# Patient Record
Sex: MALE | Race: WHITE | NOT HISPANIC OR LATINO | URBAN - METROPOLITAN AREA
[De-identification: names, ages, dates, MRNs, and addresses within clinical notes are randomized per-mention and may not be internally consistent; named-entity substitution may affect disease eponyms.]

---

## 2017-01-04 ENCOUNTER — AMBULATORY - HEALTHEAST (OUTPATIENT)
Dept: ADDICTION MEDICINE | Facility: CLINIC | Age: 67
End: 2017-01-04

## 2017-01-05 ENCOUNTER — OFFICE VISIT - HEALTHEAST (OUTPATIENT)
Dept: ADDICTION MEDICINE | Facility: CLINIC | Age: 67
End: 2017-01-05

## 2017-01-05 DIAGNOSIS — F10.20 ALCOHOL USE DISORDER, SEVERE, DEPENDENCE (H): ICD-10-CM

## 2017-01-06 ENCOUNTER — AMBULATORY - HEALTHEAST (OUTPATIENT)
Dept: BEHAVIORAL HEALTH | Facility: CLINIC | Age: 67
End: 2017-01-06

## 2017-01-06 ENCOUNTER — COMMUNICATION - HEALTHEAST (OUTPATIENT)
Dept: BEHAVIORAL HEALTH | Facility: CLINIC | Age: 67
End: 2017-01-06

## 2017-01-06 DIAGNOSIS — F10.20 ALCOHOL USE DISORDER, SEVERE, DEPENDENCE (H): ICD-10-CM

## 2017-01-06 DIAGNOSIS — F32.A DEPRESSION: ICD-10-CM

## 2017-01-06 DIAGNOSIS — F11.21 OPIOID USE DISORDER, SEVERE, IN EARLY REMISSION, ON MAINTENANCE THERAPY, DEPENDENCE (H): ICD-10-CM

## 2017-01-07 ENCOUNTER — COMMUNICATION - HEALTHEAST (OUTPATIENT)
Dept: SCHEDULING | Facility: CLINIC | Age: 67
End: 2017-01-07

## 2017-01-09 ENCOUNTER — AMBULATORY - HEALTHEAST (OUTPATIENT)
Dept: ADDICTION MEDICINE | Facility: CLINIC | Age: 67
End: 2017-01-09

## 2017-01-11 ENCOUNTER — OFFICE VISIT - HEALTHEAST (OUTPATIENT)
Dept: BEHAVIORAL HEALTH | Facility: CLINIC | Age: 67
End: 2017-01-11

## 2017-01-11 DIAGNOSIS — F10.20 ALCOHOL USE DISORDER, SEVERE, DEPENDENCE (H): ICD-10-CM

## 2017-01-11 DIAGNOSIS — F43.10 POST TRAUMATIC STRESS DISORDER: ICD-10-CM

## 2017-01-11 DIAGNOSIS — F11.21 OPIOID USE DISORDER, SEVERE, IN EARLY REMISSION, ON MAINTENANCE THERAPY, DEPENDENCE (H): ICD-10-CM

## 2017-01-11 DIAGNOSIS — F32.A DEPRESSION: ICD-10-CM

## 2017-01-11 DIAGNOSIS — F32.0 MILD SINGLE CURRENT EPISODE OF MAJOR DEPRESSIVE DISORDER (H): ICD-10-CM

## 2017-01-11 ASSESSMENT — MIFFLIN-ST. JEOR: SCORE: 1616.29

## 2017-01-12 ENCOUNTER — OFFICE VISIT - HEALTHEAST (OUTPATIENT)
Dept: ADDICTION MEDICINE | Facility: CLINIC | Age: 67
End: 2017-01-12

## 2017-01-12 ENCOUNTER — OFFICE VISIT - HEALTHEAST (OUTPATIENT)
Dept: BEHAVIORAL HEALTH | Facility: CLINIC | Age: 67
End: 2017-01-12

## 2017-01-12 DIAGNOSIS — F11.21 OPIOID USE DISORDER, SEVERE, IN EARLY REMISSION, ON MAINTENANCE THERAPY, DEPENDENCE (H): ICD-10-CM

## 2017-01-12 DIAGNOSIS — F10.21 ALCOHOL USE DISORDER, SEVERE, IN EARLY REMISSION (H): ICD-10-CM

## 2017-01-12 DIAGNOSIS — F39 UNSPECIFIED MOOD (AFFECTIVE) DISORDER (H): ICD-10-CM

## 2017-01-12 DIAGNOSIS — F10.20 ALCOHOL USE DISORDER, SEVERE, DEPENDENCE (H): ICD-10-CM

## 2017-01-16 ENCOUNTER — AMBULATORY - HEALTHEAST (OUTPATIENT)
Dept: ADDICTION MEDICINE | Facility: CLINIC | Age: 67
End: 2017-01-16

## 2017-01-18 ENCOUNTER — COMMUNICATION - HEALTHEAST (OUTPATIENT)
Dept: BEHAVIORAL HEALTH | Facility: CLINIC | Age: 67
End: 2017-01-18

## 2017-01-18 DIAGNOSIS — F32.A DEPRESSION: ICD-10-CM

## 2017-01-19 ENCOUNTER — OFFICE VISIT - HEALTHEAST (OUTPATIENT)
Dept: ADDICTION MEDICINE | Facility: CLINIC | Age: 67
End: 2017-01-19

## 2017-01-19 DIAGNOSIS — F10.20 ALCOHOL USE DISORDER, SEVERE, DEPENDENCE (H): ICD-10-CM

## 2017-01-23 ENCOUNTER — AMBULATORY - HEALTHEAST (OUTPATIENT)
Dept: ADDICTION MEDICINE | Facility: CLINIC | Age: 67
End: 2017-01-23

## 2017-01-26 ENCOUNTER — OFFICE VISIT - HEALTHEAST (OUTPATIENT)
Dept: ADDICTION MEDICINE | Facility: CLINIC | Age: 67
End: 2017-01-26

## 2017-01-26 DIAGNOSIS — F10.20 ALCOHOL USE DISORDER, SEVERE, DEPENDENCE (H): ICD-10-CM

## 2017-01-30 ENCOUNTER — AMBULATORY - HEALTHEAST (OUTPATIENT)
Dept: ADDICTION MEDICINE | Facility: CLINIC | Age: 67
End: 2017-01-30

## 2017-01-31 ENCOUNTER — COMMUNICATION - HEALTHEAST (OUTPATIENT)
Dept: BEHAVIORAL HEALTH | Facility: CLINIC | Age: 67
End: 2017-01-31

## 2017-02-02 ENCOUNTER — OFFICE VISIT - HEALTHEAST (OUTPATIENT)
Dept: ADDICTION MEDICINE | Facility: CLINIC | Age: 67
End: 2017-02-02

## 2017-02-02 DIAGNOSIS — F10.20 ALCOHOL USE DISORDER, SEVERE, DEPENDENCE (H): ICD-10-CM

## 2017-02-06 ENCOUNTER — AMBULATORY - HEALTHEAST (OUTPATIENT)
Dept: ADDICTION MEDICINE | Facility: CLINIC | Age: 67
End: 2017-02-06

## 2017-02-09 ENCOUNTER — OFFICE VISIT - HEALTHEAST (OUTPATIENT)
Dept: ADDICTION MEDICINE | Facility: CLINIC | Age: 67
End: 2017-02-09

## 2017-02-09 DIAGNOSIS — F10.20 ALCOHOL USE DISORDER, SEVERE, DEPENDENCE (H): ICD-10-CM

## 2017-02-13 ENCOUNTER — AMBULATORY - HEALTHEAST (OUTPATIENT)
Dept: ADDICTION MEDICINE | Facility: CLINIC | Age: 67
End: 2017-02-13

## 2017-02-16 ENCOUNTER — AMBULATORY - HEALTHEAST (OUTPATIENT)
Dept: BEHAVIORAL HEALTH | Facility: CLINIC | Age: 67
End: 2017-02-16

## 2017-02-16 ENCOUNTER — OFFICE VISIT - HEALTHEAST (OUTPATIENT)
Dept: ADDICTION MEDICINE | Facility: CLINIC | Age: 67
End: 2017-02-16

## 2017-02-16 DIAGNOSIS — F11.21 OPIOID USE DISORDER, SEVERE, IN EARLY REMISSION, ON MAINTENANCE THERAPY, DEPENDENCE (H): ICD-10-CM

## 2017-02-16 DIAGNOSIS — F10.20 ALCOHOL USE DISORDER, SEVERE, DEPENDENCE (H): ICD-10-CM

## 2017-02-16 ASSESSMENT — MIFFLIN-ST. JEOR: SCORE: 1589.07

## 2017-02-20 ENCOUNTER — AMBULATORY - HEALTHEAST (OUTPATIENT)
Dept: ADDICTION MEDICINE | Facility: CLINIC | Age: 67
End: 2017-02-20

## 2017-02-23 ENCOUNTER — OFFICE VISIT - HEALTHEAST (OUTPATIENT)
Dept: ADDICTION MEDICINE | Facility: CLINIC | Age: 67
End: 2017-02-23

## 2017-02-23 DIAGNOSIS — F10.20 ALCOHOL USE DISORDER, SEVERE, DEPENDENCE (H): ICD-10-CM

## 2017-02-27 ENCOUNTER — AMBULATORY - HEALTHEAST (OUTPATIENT)
Dept: ADDICTION MEDICINE | Facility: CLINIC | Age: 67
End: 2017-02-27

## 2017-03-02 ENCOUNTER — OFFICE VISIT - HEALTHEAST (OUTPATIENT)
Dept: ADDICTION MEDICINE | Facility: CLINIC | Age: 67
End: 2017-03-02

## 2017-03-02 DIAGNOSIS — F10.20 ALCOHOL USE DISORDER, SEVERE, DEPENDENCE (H): ICD-10-CM

## 2017-03-06 ENCOUNTER — AMBULATORY - HEALTHEAST (OUTPATIENT)
Dept: ADDICTION MEDICINE | Facility: CLINIC | Age: 67
End: 2017-03-06

## 2017-03-16 ENCOUNTER — AMBULATORY - HEALTHEAST (OUTPATIENT)
Dept: BEHAVIORAL HEALTH | Facility: CLINIC | Age: 67
End: 2017-03-16

## 2017-03-16 ENCOUNTER — OFFICE VISIT - HEALTHEAST (OUTPATIENT)
Dept: ADDICTION MEDICINE | Facility: CLINIC | Age: 67
End: 2017-03-16

## 2017-03-16 DIAGNOSIS — F11.21 OPIOID USE DISORDER, SEVERE, IN EARLY REMISSION, ON MAINTENANCE THERAPY, DEPENDENCE (H): ICD-10-CM

## 2017-03-16 DIAGNOSIS — F10.20 ALCOHOL USE DISORDER, SEVERE, DEPENDENCE (H): ICD-10-CM

## 2017-03-16 ASSESSMENT — MIFFLIN-ST. JEOR: SCORE: 1571.29

## 2017-03-20 ENCOUNTER — COMMUNICATION - HEALTHEAST (OUTPATIENT)
Dept: BEHAVIORAL HEALTH | Facility: CLINIC | Age: 67
End: 2017-03-20

## 2017-03-20 ENCOUNTER — AMBULATORY - HEALTHEAST (OUTPATIENT)
Dept: ADDICTION MEDICINE | Facility: CLINIC | Age: 67
End: 2017-03-20

## 2017-03-20 DIAGNOSIS — F32.0 MILD SINGLE CURRENT EPISODE OF MAJOR DEPRESSIVE DISORDER (H): ICD-10-CM

## 2017-03-20 DIAGNOSIS — F43.10 POST TRAUMATIC STRESS DISORDER: ICD-10-CM

## 2017-04-13 ENCOUNTER — AMBULATORY - HEALTHEAST (OUTPATIENT)
Dept: BEHAVIORAL HEALTH | Facility: CLINIC | Age: 67
End: 2017-04-13

## 2017-04-13 DIAGNOSIS — F11.21 OPIOID USE DISORDER, SEVERE, IN EARLY REMISSION, ON MAINTENANCE THERAPY, DEPENDENCE (H): ICD-10-CM

## 2017-04-13 DIAGNOSIS — F10.20 ALCOHOL USE DISORDER, SEVERE, DEPENDENCE (H): ICD-10-CM

## 2017-04-13 ASSESSMENT — MIFFLIN-ST. JEOR: SCORE: 1557.32

## 2017-05-01 ENCOUNTER — COMMUNICATION - HEALTHEAST (OUTPATIENT)
Dept: BEHAVIORAL HEALTH | Facility: CLINIC | Age: 67
End: 2017-05-01

## 2017-05-01 DIAGNOSIS — F51.5 NIGHTMARES: ICD-10-CM

## 2017-05-04 ENCOUNTER — COMMUNICATION - HEALTHEAST (OUTPATIENT)
Dept: BEHAVIORAL HEALTH | Facility: CLINIC | Age: 67
End: 2017-05-04

## 2017-05-04 DIAGNOSIS — F11.21 OPIOID USE DISORDER, SEVERE, IN EARLY REMISSION, ON MAINTENANCE THERAPY, DEPENDENCE (H): ICD-10-CM

## 2017-05-04 DIAGNOSIS — F10.20 ALCOHOL USE DISORDER, SEVERE, DEPENDENCE (H): ICD-10-CM

## 2017-05-09 ENCOUNTER — COMMUNICATION - HEALTHEAST (OUTPATIENT)
Dept: BEHAVIORAL HEALTH | Facility: CLINIC | Age: 67
End: 2017-05-09

## 2017-05-09 DIAGNOSIS — R18.8 ASCITES: ICD-10-CM

## 2017-05-10 ENCOUNTER — OFFICE VISIT - HEALTHEAST (OUTPATIENT)
Dept: BEHAVIORAL HEALTH | Facility: CLINIC | Age: 67
End: 2017-05-10

## 2017-05-10 DIAGNOSIS — F11.21 OPIOID USE DISORDER, SEVERE, IN EARLY REMISSION, ON MAINTENANCE THERAPY, DEPENDENCE (H): ICD-10-CM

## 2017-05-10 DIAGNOSIS — F32.9 MDD (MAJOR DEPRESSIVE DISORDER): ICD-10-CM

## 2017-05-10 DIAGNOSIS — F10.20 ALCOHOL USE DISORDER, SEVERE, DEPENDENCE (H): ICD-10-CM

## 2017-05-10 ASSESSMENT — MIFFLIN-ST. JEOR: SCORE: 1589.07

## 2017-06-06 ENCOUNTER — COMMUNICATION - HEALTHEAST (OUTPATIENT)
Dept: BEHAVIORAL HEALTH | Facility: CLINIC | Age: 67
End: 2017-06-06

## 2017-06-06 DIAGNOSIS — F11.21 OPIOID USE DISORDER, SEVERE, IN EARLY REMISSION, ON MAINTENANCE THERAPY, DEPENDENCE (H): ICD-10-CM

## 2017-06-06 DIAGNOSIS — F10.20 ALCOHOL USE DISORDER, SEVERE, DEPENDENCE (H): ICD-10-CM

## 2017-06-07 ENCOUNTER — AMBULATORY - HEALTHEAST (OUTPATIENT)
Dept: BEHAVIORAL HEALTH | Facility: CLINIC | Age: 67
End: 2017-06-07

## 2017-06-07 DIAGNOSIS — F11.21 OPIOID USE DISORDER, SEVERE, IN EARLY REMISSION, ON MAINTENANCE THERAPY, DEPENDENCE (H): ICD-10-CM

## 2017-06-07 DIAGNOSIS — F10.20 ALCOHOL USE DISORDER, SEVERE, DEPENDENCE (H): ICD-10-CM

## 2017-06-07 ASSESSMENT — MIFFLIN-ST. JEOR: SCORE: 1561.85

## 2017-06-21 ENCOUNTER — COMMUNICATION - HEALTHEAST (OUTPATIENT)
Dept: BEHAVIORAL HEALTH | Facility: CLINIC | Age: 67
End: 2017-06-21

## 2017-06-21 DIAGNOSIS — F10.20 ALCOHOL USE DISORDER, SEVERE, DEPENDENCE (H): ICD-10-CM

## 2017-06-21 DIAGNOSIS — F32.A DEPRESSION: ICD-10-CM

## 2017-06-21 DIAGNOSIS — F11.21 OPIOID USE DISORDER, SEVERE, IN EARLY REMISSION, ON MAINTENANCE THERAPY, DEPENDENCE (H): ICD-10-CM

## 2017-06-22 ENCOUNTER — COMMUNICATION - HEALTHEAST (OUTPATIENT)
Dept: BEHAVIORAL HEALTH | Facility: CLINIC | Age: 67
End: 2017-06-22

## 2017-06-22 DIAGNOSIS — F11.21 OPIOID USE DISORDER, SEVERE, IN EARLY REMISSION, ON MAINTENANCE THERAPY, DEPENDENCE (H): ICD-10-CM

## 2017-06-22 DIAGNOSIS — F10.20 ALCOHOL USE DISORDER, SEVERE, DEPENDENCE (H): ICD-10-CM

## 2017-06-23 ENCOUNTER — COMMUNICATION - HEALTHEAST (OUTPATIENT)
Dept: BEHAVIORAL HEALTH | Facility: CLINIC | Age: 67
End: 2017-06-23

## 2017-06-23 DIAGNOSIS — F43.10 POST TRAUMATIC STRESS DISORDER: ICD-10-CM

## 2017-06-23 DIAGNOSIS — F32.0 MILD SINGLE CURRENT EPISODE OF MAJOR DEPRESSIVE DISORDER (H): ICD-10-CM

## 2017-07-13 ENCOUNTER — AMBULATORY - HEALTHEAST (OUTPATIENT)
Dept: BEHAVIORAL HEALTH | Facility: CLINIC | Age: 67
End: 2017-07-13

## 2017-07-13 DIAGNOSIS — F11.21 OPIOID USE DISORDER, SEVERE, IN EARLY REMISSION, ON MAINTENANCE THERAPY, DEPENDENCE (H): ICD-10-CM

## 2017-07-13 DIAGNOSIS — F10.20 ALCOHOL USE DISORDER, SEVERE, DEPENDENCE (H): ICD-10-CM

## 2017-07-13 ASSESSMENT — MIFFLIN-ST. JEOR: SCORE: 1557.32

## 2017-07-24 ENCOUNTER — COMMUNICATION - HEALTHEAST (OUTPATIENT)
Dept: BEHAVIORAL HEALTH | Facility: CLINIC | Age: 67
End: 2017-07-24

## 2017-07-24 DIAGNOSIS — F10.20 ALCOHOL USE DISORDER, SEVERE, DEPENDENCE (H): ICD-10-CM

## 2017-07-24 DIAGNOSIS — F11.21 OPIOID USE DISORDER, SEVERE, IN EARLY REMISSION, ON MAINTENANCE THERAPY, DEPENDENCE (H): ICD-10-CM

## 2017-08-09 ENCOUNTER — AMBULATORY - HEALTHEAST (OUTPATIENT)
Dept: BEHAVIORAL HEALTH | Facility: CLINIC | Age: 67
End: 2017-08-09

## 2017-08-09 DIAGNOSIS — F10.20 ALCOHOL USE DISORDER, SEVERE, DEPENDENCE (H): ICD-10-CM

## 2017-08-09 DIAGNOSIS — F11.21 OPIOID USE DISORDER, SEVERE, IN EARLY REMISSION, ON MAINTENANCE THERAPY, DEPENDENCE (H): ICD-10-CM

## 2017-08-09 ASSESSMENT — MIFFLIN-ST. JEOR: SCORE: 1557.86

## 2017-08-10 ENCOUNTER — AMBULATORY - HEALTHEAST (OUTPATIENT)
Dept: BEHAVIORAL HEALTH | Facility: CLINIC | Age: 67
End: 2017-08-10

## 2017-08-10 DIAGNOSIS — F10.20 ALCOHOL USE DISORDER, SEVERE, DEPENDENCE (H): ICD-10-CM

## 2017-08-10 DIAGNOSIS — F11.21 OPIOID USE DISORDER, SEVERE, IN EARLY REMISSION, ON MAINTENANCE THERAPY, DEPENDENCE (H): ICD-10-CM

## 2017-09-06 ENCOUNTER — OFFICE VISIT - HEALTHEAST (OUTPATIENT)
Dept: BEHAVIORAL HEALTH | Facility: CLINIC | Age: 67
End: 2017-09-06

## 2017-09-06 DIAGNOSIS — F11.21 OPIOID USE DISORDER, SEVERE, IN EARLY REMISSION, ON MAINTENANCE THERAPY, DEPENDENCE (H): ICD-10-CM

## 2017-09-06 DIAGNOSIS — F32.A DEPRESSION: ICD-10-CM

## 2017-09-06 DIAGNOSIS — F10.20 ALCOHOL USE DISORDER, SEVERE, DEPENDENCE (H): ICD-10-CM

## 2017-09-06 ASSESSMENT — MIFFLIN-ST. JEOR: SCORE: 1557.32

## 2017-10-04 ENCOUNTER — COMMUNICATION - HEALTHEAST (OUTPATIENT)
Dept: BEHAVIORAL HEALTH | Facility: CLINIC | Age: 67
End: 2017-10-04

## 2017-10-04 DIAGNOSIS — F11.21 OPIOID USE DISORDER, SEVERE, IN EARLY REMISSION, ON MAINTENANCE THERAPY, DEPENDENCE (H): ICD-10-CM

## 2017-10-04 DIAGNOSIS — F32.A DEPRESSION: ICD-10-CM

## 2017-10-04 DIAGNOSIS — F10.20 ALCOHOL USE DISORDER, SEVERE, DEPENDENCE (H): ICD-10-CM

## 2017-10-19 ENCOUNTER — COMMUNICATION - HEALTHEAST (OUTPATIENT)
Dept: BEHAVIORAL HEALTH | Facility: CLINIC | Age: 67
End: 2017-10-19

## 2017-10-19 DIAGNOSIS — F32.0 MILD SINGLE CURRENT EPISODE OF MAJOR DEPRESSIVE DISORDER (H): ICD-10-CM

## 2017-10-19 DIAGNOSIS — F43.10 POST TRAUMATIC STRESS DISORDER: ICD-10-CM

## 2017-11-04 ENCOUNTER — COMMUNICATION - HEALTHEAST (OUTPATIENT)
Dept: BEHAVIORAL HEALTH | Facility: CLINIC | Age: 67
End: 2017-11-04

## 2017-11-04 DIAGNOSIS — F11.21 OPIOID USE DISORDER, SEVERE, IN EARLY REMISSION, ON MAINTENANCE THERAPY, DEPENDENCE (H): ICD-10-CM

## 2017-11-12 ENCOUNTER — COMMUNICATION - HEALTHEAST (OUTPATIENT)
Dept: BEHAVIORAL HEALTH | Facility: CLINIC | Age: 67
End: 2017-11-12

## 2017-11-12 DIAGNOSIS — F10.20 ALCOHOL USE DISORDER, SEVERE, DEPENDENCE (H): ICD-10-CM

## 2017-11-12 DIAGNOSIS — F11.21 OPIOID USE DISORDER, SEVERE, IN EARLY REMISSION, ON MAINTENANCE THERAPY, DEPENDENCE (H): ICD-10-CM

## 2017-11-13 ENCOUNTER — COMMUNICATION - HEALTHEAST (OUTPATIENT)
Dept: BEHAVIORAL HEALTH | Facility: CLINIC | Age: 67
End: 2017-11-13

## 2017-11-29 ENCOUNTER — COMMUNICATION - HEALTHEAST (OUTPATIENT)
Dept: BEHAVIORAL HEALTH | Facility: CLINIC | Age: 67
End: 2017-11-29

## 2017-12-07 ENCOUNTER — AMBULATORY - HEALTHEAST (OUTPATIENT)
Dept: BEHAVIORAL HEALTH | Facility: CLINIC | Age: 67
End: 2017-12-07

## 2017-12-07 ENCOUNTER — AMBULATORY - HEALTHEAST (OUTPATIENT)
Dept: LAB | Facility: CLINIC | Age: 67
End: 2017-12-07

## 2017-12-07 DIAGNOSIS — F11.21 OPIOID DEPENDENCE IN REMISSION (H): ICD-10-CM

## 2017-12-07 DIAGNOSIS — F11.11 OPIOID ABUSE, IN REMISSION (H): ICD-10-CM

## 2017-12-07 DIAGNOSIS — F11.21 OPIOID USE DISORDER, SEVERE, IN EARLY REMISSION, ON MAINTENANCE THERAPY, DEPENDENCE (H): ICD-10-CM

## 2017-12-07 DIAGNOSIS — F43.10 POST TRAUMATIC STRESS DISORDER: ICD-10-CM

## 2017-12-07 DIAGNOSIS — F10.20 ALCOHOL USE DISORDER, SEVERE, DEPENDENCE (H): ICD-10-CM

## 2017-12-07 DIAGNOSIS — F32.A DEPRESSION: ICD-10-CM

## 2017-12-07 DIAGNOSIS — F32.0 MILD SINGLE CURRENT EPISODE OF MAJOR DEPRESSIVE DISORDER (H): ICD-10-CM

## 2017-12-07 ASSESSMENT — MIFFLIN-ST. JEOR: SCORE: 1566.39

## 2017-12-27 ENCOUNTER — OFFICE VISIT - HEALTHEAST (OUTPATIENT)
Dept: BEHAVIORAL HEALTH | Facility: CLINIC | Age: 67
End: 2017-12-27

## 2017-12-27 DIAGNOSIS — F11.21 OPIOID USE DISORDER, SEVERE, IN EARLY REMISSION, ON MAINTENANCE THERAPY, DEPENDENCE (H): ICD-10-CM

## 2017-12-27 DIAGNOSIS — F11.11 OPIOID ABUSE, IN REMISSION (H): ICD-10-CM

## 2017-12-27 DIAGNOSIS — F10.20 ALCOHOL USE DISORDER, SEVERE, DEPENDENCE (H): ICD-10-CM

## 2017-12-27 ASSESSMENT — MIFFLIN-ST. JEOR: SCORE: 1561.85

## 2018-01-07 ENCOUNTER — COMMUNICATION - HEALTHEAST (OUTPATIENT)
Dept: BEHAVIORAL HEALTH | Facility: CLINIC | Age: 68
End: 2018-01-07

## 2018-01-07 DIAGNOSIS — F32.A DEPRESSION: ICD-10-CM

## 2018-01-23 ENCOUNTER — COMMUNICATION - HEALTHEAST (OUTPATIENT)
Dept: BEHAVIORAL HEALTH | Facility: CLINIC | Age: 68
End: 2018-01-23

## 2018-01-23 DIAGNOSIS — F32.0 MILD SINGLE CURRENT EPISODE OF MAJOR DEPRESSIVE DISORDER (H): ICD-10-CM

## 2018-01-23 DIAGNOSIS — F43.10 POST TRAUMATIC STRESS DISORDER: ICD-10-CM

## 2018-01-24 ENCOUNTER — AMBULATORY - HEALTHEAST (OUTPATIENT)
Dept: BEHAVIORAL HEALTH | Facility: CLINIC | Age: 68
End: 2018-01-24

## 2018-01-24 DIAGNOSIS — F32.0 MILD SINGLE CURRENT EPISODE OF MAJOR DEPRESSIVE DISORDER (H): ICD-10-CM

## 2018-01-24 DIAGNOSIS — F11.11 OPIOID ABUSE, IN REMISSION (H): ICD-10-CM

## 2018-01-24 DIAGNOSIS — F43.10 POST TRAUMATIC STRESS DISORDER: ICD-10-CM

## 2018-01-24 DIAGNOSIS — F10.20 ALCOHOL USE DISORDER, SEVERE, DEPENDENCE (H): ICD-10-CM

## 2018-01-24 DIAGNOSIS — F11.21 OPIOID USE DISORDER, SEVERE, IN EARLY REMISSION, ON MAINTENANCE THERAPY, DEPENDENCE (H): ICD-10-CM

## 2018-01-24 LAB
AMPHETAMINES UR QL SCN: NORMAL
BARBITURATES UR QL: NORMAL
BENZODIAZ UR QL: NORMAL
CANNABINOIDS UR QL SCN: NORMAL
COCAINE UR QL: NORMAL
CREAT UR-MCNC: 122.1 MG/DL
METHADONE UR QL SCN: NORMAL
OPIATES UR QL SCN: NORMAL
OXYCODONE UR QL: NORMAL
PCP UR QL SCN: NORMAL

## 2018-01-24 ASSESSMENT — MIFFLIN-ST. JEOR: SCORE: 1566.39

## 2018-01-25 ENCOUNTER — COMMUNICATION - HEALTHEAST (OUTPATIENT)
Dept: BEHAVIORAL HEALTH | Facility: CLINIC | Age: 68
End: 2018-01-25

## 2018-02-28 ENCOUNTER — COMMUNICATION - HEALTHEAST (OUTPATIENT)
Dept: BEHAVIORAL HEALTH | Facility: CLINIC | Age: 68
End: 2018-02-28

## 2018-02-28 DIAGNOSIS — F11.21 OPIOID USE DISORDER, SEVERE, IN EARLY REMISSION, ON MAINTENANCE THERAPY, DEPENDENCE (H): ICD-10-CM

## 2018-02-28 DIAGNOSIS — F10.20 ALCOHOL USE DISORDER, SEVERE, DEPENDENCE (H): ICD-10-CM

## 2018-03-14 ENCOUNTER — OFFICE VISIT - HEALTHEAST (OUTPATIENT)
Dept: BEHAVIORAL HEALTH | Facility: CLINIC | Age: 68
End: 2018-03-14

## 2018-03-14 DIAGNOSIS — F10.20 ALCOHOL USE DISORDER, SEVERE, DEPENDENCE (H): ICD-10-CM

## 2018-03-14 DIAGNOSIS — F11.21 OPIOID USE DISORDER, SEVERE, IN EARLY REMISSION, ON MAINTENANCE THERAPY, DEPENDENCE (H): ICD-10-CM

## 2018-03-14 DIAGNOSIS — F11.11 OPIOID ABUSE, IN REMISSION (H): ICD-10-CM

## 2018-03-14 LAB
AMPHETAMINES UR QL SCN: NORMAL
BARBITURATES UR QL: NORMAL
BENZODIAZ UR QL: NORMAL
BUPRENORPHINE QUAL URINE LHE: ABNORMAL
CANNABINOIDS UR QL SCN: NORMAL
COCAINE UR QL: NORMAL
CREAT UR-MCNC: 136.3 MG/DL
CREAT UR-MCNC: 142.7 MG/DL
METHADONE UR QL SCN: NORMAL
OPIATES UR QL SCN: NORMAL
OXYCODONE UR QL: NORMAL
PCP UR QL SCN: NORMAL

## 2018-03-14 ASSESSMENT — MIFFLIN-ST. JEOR: SCORE: 1552.78

## 2018-03-30 ENCOUNTER — COMMUNICATION - HEALTHEAST (OUTPATIENT)
Dept: BEHAVIORAL HEALTH | Facility: CLINIC | Age: 68
End: 2018-03-30

## 2018-03-30 DIAGNOSIS — F10.20 ALCOHOL USE DISORDER, SEVERE, DEPENDENCE (H): ICD-10-CM

## 2018-03-30 DIAGNOSIS — F11.21 OPIOID USE DISORDER, SEVERE, IN EARLY REMISSION, ON MAINTENANCE THERAPY, DEPENDENCE (H): ICD-10-CM

## 2018-04-12 ENCOUNTER — OFFICE VISIT - HEALTHEAST (OUTPATIENT)
Dept: BEHAVIORAL HEALTH | Facility: CLINIC | Age: 68
End: 2018-04-12

## 2018-04-12 DIAGNOSIS — F11.20 UNCOMPLICATED OPIOID DEPENDENCE (H): ICD-10-CM

## 2018-04-12 DIAGNOSIS — F11.21 OPIOID USE DISORDER, SEVERE, IN EARLY REMISSION, ON MAINTENANCE THERAPY, DEPENDENCE (H): ICD-10-CM

## 2018-04-12 DIAGNOSIS — F11.11 OPIOID ABUSE, IN REMISSION (H): ICD-10-CM

## 2018-04-12 DIAGNOSIS — F11.20 OPIOID USE DISORDER, SEVERE, DEPENDENCE (H): ICD-10-CM

## 2018-04-12 LAB
AMPHETAMINES UR QL SCN: NORMAL
BARBITURATES UR QL: NORMAL
BENZODIAZ UR QL: NORMAL
CANNABINOIDS UR QL SCN: NORMAL
COCAINE UR QL: NORMAL
CREAT UR-MCNC: 134.6 MG/DL
METHADONE UR QL SCN: NORMAL
OPIATES UR QL SCN: NORMAL
OXYCODONE UR QL: NORMAL
PCP UR QL SCN: NORMAL

## 2018-04-12 ASSESSMENT — MIFFLIN-ST. JEOR: SCORE: 1543.71

## 2018-04-22 ENCOUNTER — COMMUNICATION - HEALTHEAST (OUTPATIENT)
Dept: BEHAVIORAL HEALTH | Facility: CLINIC | Age: 68
End: 2018-04-22

## 2018-04-22 DIAGNOSIS — F10.20 ALCOHOL USE DISORDER, SEVERE, DEPENDENCE (H): ICD-10-CM

## 2018-04-22 DIAGNOSIS — F11.21 OPIOID USE DISORDER, SEVERE, IN EARLY REMISSION, ON MAINTENANCE THERAPY, DEPENDENCE (H): ICD-10-CM

## 2018-04-26 ENCOUNTER — COMMUNICATION - HEALTHEAST (OUTPATIENT)
Dept: BEHAVIORAL HEALTH | Facility: CLINIC | Age: 68
End: 2018-04-26

## 2018-04-26 ENCOUNTER — OFFICE VISIT - HEALTHEAST (OUTPATIENT)
Dept: BEHAVIORAL HEALTH | Facility: CLINIC | Age: 68
End: 2018-04-26

## 2018-04-26 DIAGNOSIS — F11.21 OPIOID USE DISORDER, SEVERE, IN EARLY REMISSION, ON MAINTENANCE THERAPY, DEPENDENCE (H): ICD-10-CM

## 2018-05-04 ENCOUNTER — COMMUNICATION - HEALTHEAST (OUTPATIENT)
Dept: BEHAVIORAL HEALTH | Facility: CLINIC | Age: 68
End: 2018-05-04

## 2018-05-04 DIAGNOSIS — F32.A DEPRESSION: ICD-10-CM

## 2018-05-10 ENCOUNTER — OFFICE VISIT - HEALTHEAST (OUTPATIENT)
Dept: BEHAVIORAL HEALTH | Facility: CLINIC | Age: 68
End: 2018-05-10

## 2018-05-10 DIAGNOSIS — F11.20 OPIOID USE DISORDER, SEVERE, DEPENDENCE (H): ICD-10-CM

## 2018-05-10 DIAGNOSIS — F11.21 OPIOID USE DISORDER, SEVERE, IN EARLY REMISSION, ON MAINTENANCE THERAPY, DEPENDENCE (H): ICD-10-CM

## 2018-05-10 DIAGNOSIS — F43.10 POST TRAUMATIC STRESS DISORDER: ICD-10-CM

## 2018-05-10 DIAGNOSIS — F32.0 MILD SINGLE CURRENT EPISODE OF MAJOR DEPRESSIVE DISORDER (H): ICD-10-CM

## 2018-05-10 DIAGNOSIS — F11.10 OPIATE ABUSE, CONTINUOUS (H): ICD-10-CM

## 2018-05-10 LAB
AMPHETAMINES UR QL SCN: ABNORMAL
BARBITURATES UR QL: ABNORMAL
BENZODIAZ UR QL: ABNORMAL
CANNABINOIDS UR QL SCN: ABNORMAL
COCAINE UR QL: ABNORMAL
CREAT UR-MCNC: 120.7 MG/DL
METHADONE UR QL SCN: ABNORMAL
OPIATES UR QL SCN: ABNORMAL
OXYCODONE UR QL: ABNORMAL
PCP UR QL SCN: ABNORMAL

## 2018-05-10 ASSESSMENT — MIFFLIN-ST. JEOR: SCORE: 1593.61

## 2018-05-14 LAB
THC CARBOXYLIC ACID-BY GC/MS: 26 NG/ML
TOXICOLOGIST REVIEW: NORMAL

## 2018-05-18 ENCOUNTER — RECORDS - HEALTHEAST (OUTPATIENT)
Dept: LAB | Facility: CLINIC | Age: 68
End: 2018-05-18

## 2018-05-18 LAB
AFP SERPL-MCNC: 2.1 UG/ML
ALBUMIN SERPL-MCNC: 3.8 G/DL (ref 3.5–5)
ALP SERPL-CCNC: 106 U/L (ref 45–120)
ALT SERPL W P-5'-P-CCNC: 24 U/L (ref 0–45)
ANION GAP SERPL CALCULATED.3IONS-SCNC: 12 MMOL/L (ref 5–18)
AST SERPL W P-5'-P-CCNC: 48 U/L (ref 0–40)
BILIRUB SERPL-MCNC: 0.5 MG/DL (ref 0–1)
BUN SERPL-MCNC: 17 MG/DL (ref 8–22)
CALCIUM SERPL-MCNC: 9.3 MG/DL (ref 8.5–10.5)
CHLORIDE BLD-SCNC: 103 MMOL/L (ref 98–107)
CO2 SERPL-SCNC: 27 MMOL/L (ref 22–31)
CREAT SERPL-MCNC: 0.89 MG/DL (ref 0.7–1.3)
GFR SERPL CREATININE-BSD FRML MDRD: >60 ML/MIN/1.73M2
GLUCOSE BLD-MCNC: 114 MG/DL (ref 70–125)
POTASSIUM BLD-SCNC: 4.6 MMOL/L (ref 3.5–5)
PROT SERPL-MCNC: 7.6 G/DL (ref 6–8)
SODIUM SERPL-SCNC: 142 MMOL/L (ref 136–145)

## 2018-05-19 ENCOUNTER — COMMUNICATION - HEALTHEAST (OUTPATIENT)
Dept: BEHAVIORAL HEALTH | Facility: CLINIC | Age: 68
End: 2018-05-19

## 2018-05-19 DIAGNOSIS — F10.20 ALCOHOL USE DISORDER, SEVERE, DEPENDENCE (H): ICD-10-CM

## 2018-05-19 DIAGNOSIS — F11.21 OPIOID USE DISORDER, SEVERE, IN EARLY REMISSION, ON MAINTENANCE THERAPY, DEPENDENCE (H): ICD-10-CM

## 2018-05-31 ENCOUNTER — OFFICE VISIT - HEALTHEAST (OUTPATIENT)
Dept: BEHAVIORAL HEALTH | Facility: CLINIC | Age: 68
End: 2018-05-31

## 2018-05-31 DIAGNOSIS — F32.A DEPRESSION: ICD-10-CM

## 2018-05-31 DIAGNOSIS — F11.20 OPIOID USE DISORDER, SEVERE, DEPENDENCE (H): ICD-10-CM

## 2018-05-31 LAB
AMPHETAMINES UR QL SCN: NORMAL
BARBITURATES UR QL: NORMAL
BENZODIAZ UR QL: NORMAL
CANNABINOIDS UR QL SCN: NORMAL
COCAINE UR QL: NORMAL
CREAT UR-MCNC: 183.4 MG/DL
METHADONE UR QL SCN: NORMAL
OPIATES UR QL SCN: NORMAL
OXYCODONE UR QL: NORMAL
PCP UR QL SCN: NORMAL

## 2018-05-31 ASSESSMENT — MIFFLIN-ST. JEOR: SCORE: 1539.17

## 2018-06-21 ENCOUNTER — COMMUNICATION - HEALTHEAST (OUTPATIENT)
Dept: BEHAVIORAL HEALTH | Facility: CLINIC | Age: 68
End: 2018-06-21

## 2018-06-21 DIAGNOSIS — F43.10 POST TRAUMATIC STRESS DISORDER: ICD-10-CM

## 2018-06-21 DIAGNOSIS — F32.0 MILD SINGLE CURRENT EPISODE OF MAJOR DEPRESSIVE DISORDER (H): ICD-10-CM

## 2018-06-28 ENCOUNTER — COMMUNICATION - HEALTHEAST (OUTPATIENT)
Dept: BEHAVIORAL HEALTH | Facility: CLINIC | Age: 68
End: 2018-06-28

## 2018-06-28 DIAGNOSIS — F11.21 OPIOID USE DISORDER, SEVERE, IN EARLY REMISSION, ON MAINTENANCE THERAPY, DEPENDENCE (H): ICD-10-CM

## 2018-06-28 DIAGNOSIS — F11.220 OPIOID DEPENDENCE WITH UNCOMPLICATED INTOXICATION (H): ICD-10-CM

## 2018-07-05 ENCOUNTER — COMMUNICATION - HEALTHEAST (OUTPATIENT)
Dept: BEHAVIORAL HEALTH | Facility: CLINIC | Age: 68
End: 2018-07-05

## 2018-07-05 ENCOUNTER — RECORDS - HEALTHEAST (OUTPATIENT)
Dept: ADMINISTRATIVE | Facility: OTHER | Age: 68
End: 2018-07-05

## 2018-07-05 DIAGNOSIS — F11.21 OPIOID USE DISORDER, SEVERE, IN EARLY REMISSION, ON MAINTENANCE THERAPY, DEPENDENCE (H): ICD-10-CM

## 2018-08-01 ENCOUNTER — COMMUNICATION - HEALTHEAST (OUTPATIENT)
Dept: BEHAVIORAL HEALTH | Facility: CLINIC | Age: 68
End: 2018-08-01

## 2018-08-01 DIAGNOSIS — F43.10 POST TRAUMATIC STRESS DISORDER: ICD-10-CM

## 2018-08-01 DIAGNOSIS — F32.0 MILD SINGLE CURRENT EPISODE OF MAJOR DEPRESSIVE DISORDER (H): ICD-10-CM

## 2018-08-01 DIAGNOSIS — F11.21 OPIOID USE DISORDER, SEVERE, IN EARLY REMISSION, ON MAINTENANCE THERAPY, DEPENDENCE (H): ICD-10-CM

## 2018-08-02 ENCOUNTER — COMMUNICATION - HEALTHEAST (OUTPATIENT)
Dept: BEHAVIORAL HEALTH | Facility: CLINIC | Age: 68
End: 2018-08-02

## 2018-08-02 DIAGNOSIS — F11.21 OPIOID USE DISORDER, SEVERE, IN EARLY REMISSION, ON MAINTENANCE THERAPY, DEPENDENCE (H): ICD-10-CM

## 2018-08-03 ENCOUNTER — COMMUNICATION - HEALTHEAST (OUTPATIENT)
Dept: BEHAVIORAL HEALTH | Facility: CLINIC | Age: 68
End: 2018-08-03

## 2018-08-03 DIAGNOSIS — F11.21 OPIOID USE DISORDER, SEVERE, IN EARLY REMISSION, ON MAINTENANCE THERAPY, DEPENDENCE (H): ICD-10-CM

## 2018-08-09 ENCOUNTER — AMBULATORY - HEALTHEAST (OUTPATIENT)
Dept: BEHAVIORAL HEALTH | Facility: CLINIC | Age: 68
End: 2018-08-09

## 2018-08-16 ENCOUNTER — AMBULATORY - HEALTHEAST (OUTPATIENT)
Dept: BEHAVIORAL HEALTH | Facility: CLINIC | Age: 68
End: 2018-08-16

## 2018-08-16 DIAGNOSIS — F32.0 MILD SINGLE CURRENT EPISODE OF MAJOR DEPRESSIVE DISORDER (H): ICD-10-CM

## 2018-08-16 DIAGNOSIS — F11.20 OPIOID USE DISORDER, SEVERE, DEPENDENCE (H): ICD-10-CM

## 2018-08-16 DIAGNOSIS — F11.21 OPIOID USE DISORDER, SEVERE, IN EARLY REMISSION, ON MAINTENANCE THERAPY, DEPENDENCE (H): ICD-10-CM

## 2018-08-16 DIAGNOSIS — F43.10 POST TRAUMATIC STRESS DISORDER: ICD-10-CM

## 2018-08-16 LAB
AMPHETAMINES UR QL SCN: NORMAL
BARBITURATES UR QL: NORMAL
BENZODIAZ UR QL: NORMAL
BUPRENORPHINE QUAL URINE LHE: ABNORMAL
CANNABINOIDS UR QL SCN: NORMAL
COCAINE UR QL: NORMAL
CREAT UR-MCNC: 167.3 MG/DL
CREAT UR-MCNC: 169 MG/DL
METHADONE UR QL SCN: NORMAL
OPIATES UR QL SCN: NORMAL
OXYCODONE UR QL: NORMAL
PCP UR QL SCN: NORMAL

## 2018-08-16 ASSESSMENT — MIFFLIN-ST. JEOR: SCORE: 1557.86

## 2018-08-30 ENCOUNTER — COMMUNICATION - HEALTHEAST (OUTPATIENT)
Dept: BEHAVIORAL HEALTH | Facility: CLINIC | Age: 68
End: 2018-08-30

## 2018-09-11 ENCOUNTER — RECORDS - HEALTHEAST (OUTPATIENT)
Dept: ADMINISTRATIVE | Facility: OTHER | Age: 68
End: 2018-09-11

## 2018-09-28 ENCOUNTER — COMMUNICATION - HEALTHEAST (OUTPATIENT)
Dept: BEHAVIORAL HEALTH | Facility: CLINIC | Age: 68
End: 2018-09-28

## 2018-09-28 DIAGNOSIS — F11.21 OPIOID USE DISORDER, SEVERE, IN EARLY REMISSION, ON MAINTENANCE THERAPY, DEPENDENCE (H): ICD-10-CM

## 2018-10-09 ENCOUNTER — COMMUNICATION - HEALTHEAST (OUTPATIENT)
Dept: BEHAVIORAL HEALTH | Facility: CLINIC | Age: 68
End: 2018-10-09

## 2018-10-09 DIAGNOSIS — F11.21 OPIOID USE DISORDER, SEVERE, IN EARLY REMISSION, ON MAINTENANCE THERAPY, DEPENDENCE (H): ICD-10-CM

## 2018-10-09 DIAGNOSIS — F10.20 ALCOHOL USE DISORDER, SEVERE, DEPENDENCE (H): ICD-10-CM

## 2018-10-16 ENCOUNTER — COMMUNICATION - HEALTHEAST (OUTPATIENT)
Dept: BEHAVIORAL HEALTH | Facility: CLINIC | Age: 68
End: 2018-10-16

## 2018-10-16 DIAGNOSIS — F11.21 OPIOID USE DISORDER, SEVERE, IN EARLY REMISSION, ON MAINTENANCE THERAPY, DEPENDENCE (H): ICD-10-CM

## 2018-10-18 ENCOUNTER — OFFICE VISIT - HEALTHEAST (OUTPATIENT)
Dept: BEHAVIORAL HEALTH | Facility: CLINIC | Age: 68
End: 2018-10-18

## 2018-10-18 DIAGNOSIS — F33.9 MAJOR DEPRESSIVE DISORDER, RECURRENT EPISODE (H): ICD-10-CM

## 2018-10-18 DIAGNOSIS — F11.21 OPIOID USE DISORDER, SEVERE, IN EARLY REMISSION, ON MAINTENANCE THERAPY, DEPENDENCE (H): ICD-10-CM

## 2018-10-18 DIAGNOSIS — F55.8 ABUSE OF OTHER NON-PSYCHOACTIVE SUBSTANCES: ICD-10-CM

## 2018-10-18 LAB
AMPHETAMINES UR QL SCN: NORMAL
BARBITURATES UR QL: NORMAL
BENZODIAZ UR QL: NORMAL
CANNABINOIDS UR QL SCN: NORMAL
COCAINE UR QL: NORMAL
CREAT UR-MCNC: 95.2 MG/DL
METHADONE UR QL SCN: NORMAL
OPIATES UR QL SCN: NORMAL
OXYCODONE UR QL: NORMAL
PCP UR QL SCN: NORMAL

## 2018-10-18 ASSESSMENT — MIFFLIN-ST. JEOR: SCORE: 1602.68

## 2018-11-01 ENCOUNTER — COMMUNICATION - HEALTHEAST (OUTPATIENT)
Dept: BEHAVIORAL HEALTH | Facility: CLINIC | Age: 68
End: 2018-11-01

## 2018-11-01 DIAGNOSIS — F11.21 OPIOID USE DISORDER, SEVERE, IN EARLY REMISSION, ON MAINTENANCE THERAPY, DEPENDENCE (H): ICD-10-CM

## 2018-11-15 ENCOUNTER — OFFICE VISIT - HEALTHEAST (OUTPATIENT)
Dept: BEHAVIORAL HEALTH | Facility: CLINIC | Age: 68
End: 2018-11-15

## 2018-11-15 DIAGNOSIS — F10.20 ALCOHOL USE DISORDER, SEVERE, DEPENDENCE (H): ICD-10-CM

## 2018-11-15 DIAGNOSIS — F11.21 OPIOID USE DISORDER, SEVERE, IN EARLY REMISSION, ON MAINTENANCE THERAPY, DEPENDENCE (H): ICD-10-CM

## 2018-11-15 DIAGNOSIS — F55.8 ABUSE OF OTHER NON-PSYCHOACTIVE SUBSTANCES: ICD-10-CM

## 2018-11-15 LAB
AMPHETAMINES UR QL SCN: NORMAL
BARBITURATES UR QL: NORMAL
BENZODIAZ UR QL: NORMAL
BUPRENORPHINE QUAL URINE LHE: ABNORMAL
CANNABINOIDS UR QL SCN: NORMAL
COCAINE UR QL: NORMAL
CREAT UR-MCNC: 172.7 MG/DL
CREAT UR-MCNC: 175.2 MG/DL
METHADONE UR QL SCN: NORMAL
OPIATES UR QL SCN: NORMAL
OXYCODONE UR QL: NORMAL
PCP UR QL SCN: NORMAL

## 2018-11-15 ASSESSMENT — MIFFLIN-ST. JEOR: SCORE: 1607.21

## 2018-12-04 ENCOUNTER — COMMUNICATION - HEALTHEAST (OUTPATIENT)
Dept: BEHAVIORAL HEALTH | Facility: CLINIC | Age: 68
End: 2018-12-04

## 2018-12-04 DIAGNOSIS — F32.A DEPRESSION: ICD-10-CM

## 2019-01-02 ENCOUNTER — COMMUNICATION - HEALTHEAST (OUTPATIENT)
Dept: BEHAVIORAL HEALTH | Facility: CLINIC | Age: 69
End: 2019-01-02

## 2019-01-02 DIAGNOSIS — F32.A DEPRESSION: ICD-10-CM

## 2019-01-10 ENCOUNTER — OFFICE VISIT - HEALTHEAST (OUTPATIENT)
Dept: BEHAVIORAL HEALTH | Facility: CLINIC | Age: 69
End: 2019-01-10

## 2019-01-10 DIAGNOSIS — F55.8 ABUSE OF OTHER NON-PSYCHOACTIVE SUBSTANCES: ICD-10-CM

## 2019-01-10 DIAGNOSIS — F43.10 POST TRAUMATIC STRESS DISORDER: ICD-10-CM

## 2019-01-10 DIAGNOSIS — F10.20 ALCOHOL USE DISORDER, SEVERE, DEPENDENCE (H): ICD-10-CM

## 2019-01-10 DIAGNOSIS — F11.21 OPIOID USE DISORDER, SEVERE, IN EARLY REMISSION, ON MAINTENANCE THERAPY, DEPENDENCE (H): ICD-10-CM

## 2019-01-10 LAB
AMPHETAMINES UR QL SCN: NORMAL
BARBITURATES UR QL: NORMAL
BENZODIAZ UR QL: NORMAL
CANNABINOIDS UR QL SCN: NORMAL
COCAINE UR QL: NORMAL
CREAT UR-MCNC: 171.3 MG/DL
METHADONE UR QL SCN: NORMAL
OPIATES UR QL SCN: NORMAL
OXYCODONE UR QL: NORMAL
PCP UR QL SCN: NORMAL

## 2019-01-10 ASSESSMENT — MIFFLIN-ST. JEOR: SCORE: 1638.97

## 2019-01-16 ENCOUNTER — COMMUNICATION - HEALTHEAST (OUTPATIENT)
Dept: BEHAVIORAL HEALTH | Facility: CLINIC | Age: 69
End: 2019-01-16

## 2019-01-16 DIAGNOSIS — F43.10 POST TRAUMATIC STRESS DISORDER: ICD-10-CM

## 2019-01-16 DIAGNOSIS — F32.0 MILD SINGLE CURRENT EPISODE OF MAJOR DEPRESSIVE DISORDER (H): ICD-10-CM

## 2019-01-17 ENCOUNTER — COMMUNICATION - HEALTHEAST (OUTPATIENT)
Dept: BEHAVIORAL HEALTH | Facility: CLINIC | Age: 69
End: 2019-01-17

## 2019-01-17 LAB
ETHYL GLUCURONIDE UR CFM-MCNC: NORMAL NG/ML
ETHYL SULFATE UR CFM-MCNC: NORMAL NG/ML

## 2019-01-31 ENCOUNTER — COMMUNICATION - HEALTHEAST (OUTPATIENT)
Dept: BEHAVIORAL HEALTH | Facility: CLINIC | Age: 69
End: 2019-01-31

## 2019-01-31 DIAGNOSIS — F32.A DEPRESSION: ICD-10-CM

## 2019-02-04 ENCOUNTER — OFFICE VISIT - HEALTHEAST (OUTPATIENT)
Dept: BEHAVIORAL HEALTH | Facility: CLINIC | Age: 69
End: 2019-02-04

## 2019-02-04 DIAGNOSIS — F10.20 MODERATE ALCOHOL USE DISORDER (H): ICD-10-CM

## 2019-02-04 DIAGNOSIS — F43.10 POST TRAUMATIC STRESS DISORDER: ICD-10-CM

## 2019-02-04 DIAGNOSIS — F55.8 ABUSE OF OTHER NON-PSYCHOACTIVE SUBSTANCES: ICD-10-CM

## 2019-02-04 DIAGNOSIS — F11.21 OPIOID USE DISORDER, SEVERE, IN EARLY REMISSION, ON MAINTENANCE THERAPY, DEPENDENCE (H): ICD-10-CM

## 2019-02-04 LAB
AMPHETAMINES UR QL SCN: NORMAL
BARBITURATES UR QL: NORMAL
BENZODIAZ UR QL: NORMAL
BUPRENORPHINE QUAL URINE LHE: ABNORMAL
CANNABINOIDS UR QL SCN: NORMAL
COCAINE UR QL: NORMAL
CREAT UR-MCNC: 201.6 MG/DL
CREAT UR-MCNC: 211.8 MG/DL
METHADONE UR QL SCN: NORMAL
OPIATES UR QL SCN: NORMAL
OXYCODONE UR QL: NORMAL
PCP UR QL SCN: NORMAL

## 2019-02-04 ASSESSMENT — MIFFLIN-ST. JEOR: SCORE: 1611.75

## 2019-02-06 ENCOUNTER — COMMUNICATION - HEALTHEAST (OUTPATIENT)
Dept: BEHAVIORAL HEALTH | Facility: CLINIC | Age: 69
End: 2019-02-06

## 2019-02-11 ENCOUNTER — COMMUNICATION - HEALTHEAST (OUTPATIENT)
Dept: BEHAVIORAL HEALTH | Facility: CLINIC | Age: 69
End: 2019-02-11

## 2019-02-18 ENCOUNTER — COMMUNICATION - HEALTHEAST (OUTPATIENT)
Dept: BEHAVIORAL HEALTH | Facility: CLINIC | Age: 69
End: 2019-02-18

## 2019-02-18 DIAGNOSIS — F11.21 OPIOID USE DISORDER, SEVERE, IN EARLY REMISSION, ON MAINTENANCE THERAPY, DEPENDENCE (H): ICD-10-CM

## 2019-02-19 ENCOUNTER — OFFICE VISIT - HEALTHEAST (OUTPATIENT)
Dept: BEHAVIORAL HEALTH | Facility: CLINIC | Age: 69
End: 2019-02-19

## 2019-02-19 DIAGNOSIS — F11.21 OPIOID USE DISORDER, SEVERE, IN EARLY REMISSION, ON MAINTENANCE THERAPY, DEPENDENCE (H): ICD-10-CM

## 2019-02-19 DIAGNOSIS — F10.20 ALCOHOL USE DISORDER, SEVERE, DEPENDENCE (H): ICD-10-CM

## 2019-02-19 DIAGNOSIS — F43.10 POST TRAUMATIC STRESS DISORDER: ICD-10-CM

## 2019-02-19 DIAGNOSIS — F55.8 ABUSE OF OTHER NON-PSYCHOACTIVE SUBSTANCES: ICD-10-CM

## 2019-02-19 LAB
AMPHETAMINES UR QL SCN: NORMAL
BARBITURATES UR QL: NORMAL
BENZODIAZ UR QL: NORMAL
CANNABINOIDS UR QL SCN: NORMAL
COCAINE UR QL: NORMAL
CREAT UR-MCNC: 65.4 MG/DL
METHADONE UR QL SCN: NORMAL
OPIATES UR QL SCN: NORMAL
OXYCODONE UR QL: NORMAL
PCP UR QL SCN: NORMAL

## 2019-02-19 ASSESSMENT — MIFFLIN-ST. JEOR: SCORE: 1598.14

## 2019-02-24 LAB
ETHYL GLUCURONIDE UR CFM-MCNC: 8620 NG/ML
ETHYL SULFATE UR CFM-MCNC: 6740 NG/ML

## 2019-03-18 ENCOUNTER — RECORDS - HEALTHEAST (OUTPATIENT)
Dept: ADMINISTRATIVE | Facility: OTHER | Age: 69
End: 2019-03-18

## 2019-03-21 ENCOUNTER — COMMUNICATION - HEALTHEAST (OUTPATIENT)
Dept: BEHAVIORAL HEALTH | Facility: CLINIC | Age: 69
End: 2019-03-21

## 2019-03-21 DIAGNOSIS — F10.20 ALCOHOL USE DISORDER, SEVERE, DEPENDENCE (H): ICD-10-CM

## 2019-03-21 DIAGNOSIS — F11.21 OPIOID USE DISORDER, SEVERE, IN EARLY REMISSION, ON MAINTENANCE THERAPY, DEPENDENCE (H): ICD-10-CM

## 2019-03-26 ENCOUNTER — OFFICE VISIT - HEALTHEAST (OUTPATIENT)
Dept: BEHAVIORAL HEALTH | Facility: CLINIC | Age: 69
End: 2019-03-26

## 2019-03-26 DIAGNOSIS — F11.21 OPIOID USE DISORDER, SEVERE, IN EARLY REMISSION, ON MAINTENANCE THERAPY, DEPENDENCE (H): ICD-10-CM

## 2019-03-26 DIAGNOSIS — F55.8 ABUSE OF OTHER NON-PSYCHOACTIVE SUBSTANCES: ICD-10-CM

## 2019-03-26 DIAGNOSIS — F43.10 POST TRAUMATIC STRESS DISORDER: ICD-10-CM

## 2019-03-26 LAB
AMPHETAMINES UR QL SCN: NORMAL
BARBITURATES UR QL: NORMAL
BENZODIAZ UR QL: NORMAL
CANNABINOIDS UR QL SCN: NORMAL
COCAINE UR QL: NORMAL
CREAT UR-MCNC: 111.9 MG/DL
METHADONE UR QL SCN: NORMAL
OPIATES UR QL SCN: NORMAL
OXYCODONE UR QL: NORMAL
PCP UR QL SCN: NORMAL

## 2019-03-26 ASSESSMENT — MIFFLIN-ST. JEOR: SCORE: 1602.68

## 2019-03-27 LAB
ETHYL GLUCURONIDE UR CFM-MCNC: NORMAL NG/ML
ETHYL SULFATE UR CFM-MCNC: NORMAL NG/ML

## 2019-03-29 ENCOUNTER — AMBULATORY - HEALTHEAST (OUTPATIENT)
Dept: BEHAVIORAL HEALTH | Facility: CLINIC | Age: 69
End: 2019-03-29

## 2019-03-29 DIAGNOSIS — F33.1 MODERATE EPISODE OF RECURRENT MAJOR DEPRESSIVE DISORDER (H): ICD-10-CM

## 2019-03-29 DIAGNOSIS — F10.20 SEVERE ALCOHOL USE DISORDER (H): ICD-10-CM

## 2019-03-29 DIAGNOSIS — F11.20 OPIOID USE DISORDER, SEVERE, DEPENDENCE (H): ICD-10-CM

## 2019-04-03 ENCOUNTER — COMMUNICATION - HEALTHEAST (OUTPATIENT)
Dept: BEHAVIORAL HEALTH | Facility: CLINIC | Age: 69
End: 2019-04-03

## 2019-04-12 ENCOUNTER — COMMUNICATION - HEALTHEAST (OUTPATIENT)
Dept: BEHAVIORAL HEALTH | Facility: CLINIC | Age: 69
End: 2019-04-12

## 2019-04-12 DIAGNOSIS — F32.A DEPRESSION: ICD-10-CM

## 2019-04-23 ENCOUNTER — OFFICE VISIT - HEALTHEAST (OUTPATIENT)
Dept: BEHAVIORAL HEALTH | Facility: CLINIC | Age: 69
End: 2019-04-23

## 2019-04-23 DIAGNOSIS — F43.10 POST TRAUMATIC STRESS DISORDER: ICD-10-CM

## 2019-04-23 DIAGNOSIS — F55.8 ABUSE OF OTHER NON-PSYCHOACTIVE SUBSTANCES: ICD-10-CM

## 2019-04-23 DIAGNOSIS — F11.21 OPIOID USE DISORDER, SEVERE, IN EARLY REMISSION, ON MAINTENANCE THERAPY, DEPENDENCE (H): ICD-10-CM

## 2019-04-23 LAB
AMPHETAMINES UR QL SCN: NORMAL
BARBITURATES UR QL: NORMAL
BENZODIAZ UR QL: NORMAL
CANNABINOIDS UR QL SCN: NORMAL
COCAINE UR QL: NORMAL
CREAT UR-MCNC: 97.7 MG/DL
METHADONE UR QL SCN: NORMAL
OPIATES UR QL SCN: NORMAL
OXYCODONE UR QL: NORMAL
PCP UR QL SCN: NORMAL

## 2019-04-23 ASSESSMENT — MIFFLIN-ST. JEOR: SCORE: 1575.46

## 2019-04-24 LAB
ETHYL GLUCURONIDE UR CFM-MCNC: 160 NG/ML
ETHYL SULFATE UR CFM-MCNC: 454 NG/ML

## 2019-05-13 ENCOUNTER — COMMUNICATION - HEALTHEAST (OUTPATIENT)
Dept: BEHAVIORAL HEALTH | Facility: CLINIC | Age: 69
End: 2019-05-13

## 2019-05-13 DIAGNOSIS — F11.21 OPIOID USE DISORDER, SEVERE, IN EARLY REMISSION, ON MAINTENANCE THERAPY, DEPENDENCE (H): ICD-10-CM

## 2019-05-13 DIAGNOSIS — F32.A DEPRESSION: ICD-10-CM

## 2019-05-13 DIAGNOSIS — F10.20 ALCOHOL USE DISORDER, SEVERE, DEPENDENCE (H): ICD-10-CM

## 2019-05-28 ENCOUNTER — AMBULATORY - HEALTHEAST (OUTPATIENT)
Dept: LAB | Facility: CLINIC | Age: 69
End: 2019-05-28

## 2019-05-28 ENCOUNTER — OFFICE VISIT - HEALTHEAST (OUTPATIENT)
Dept: BEHAVIORAL HEALTH | Facility: CLINIC | Age: 69
End: 2019-05-28

## 2019-05-28 DIAGNOSIS — F11.21 OPIOID USE DISORDER, SEVERE, IN EARLY REMISSION, ON MAINTENANCE THERAPY, DEPENDENCE (H): ICD-10-CM

## 2019-05-28 DIAGNOSIS — K74.60 HEPATIC CIRRHOSIS, UNSPECIFIED HEPATIC CIRRHOSIS TYPE, UNSPECIFIED WHETHER ASCITES PRESENT (H): ICD-10-CM

## 2019-05-28 DIAGNOSIS — F55.8 ABUSE OF OTHER NON-PSYCHOACTIVE SUBSTANCES: ICD-10-CM

## 2019-05-28 LAB
ALBUMIN SERPL-MCNC: 4.2 G/DL (ref 3.5–5)
ALP SERPL-CCNC: 108 U/L (ref 45–120)
ALT SERPL W P-5'-P-CCNC: 31 U/L (ref 0–45)
AMPHETAMINES UR QL SCN: NORMAL
AST SERPL W P-5'-P-CCNC: 64 U/L (ref 0–40)
BARBITURATES UR QL: NORMAL
BASOPHILS # BLD AUTO: 0 THOU/UL (ref 0–0.2)
BASOPHILS NFR BLD AUTO: 1 % (ref 0–2)
BENZODIAZ UR QL: NORMAL
BILIRUB DIRECT SERPL-MCNC: 0.2 MG/DL
BILIRUB SERPL-MCNC: 0.5 MG/DL (ref 0–1)
BUPRENORPHINE QUAL URINE LHE: ABNORMAL
CANNABINOIDS UR QL SCN: NORMAL
COCAINE UR QL: NORMAL
CREAT UR-MCNC: 30.9 MG/DL
CREAT UR-MCNC: 32.2 MG/DL
EOSINOPHIL # BLD AUTO: 0.1 THOU/UL (ref 0–0.4)
EOSINOPHIL NFR BLD AUTO: 1 % (ref 0–6)
ERYTHROCYTE [DISTWIDTH] IN BLOOD BY AUTOMATED COUNT: 13.9 % (ref 11–14.5)
HCT VFR BLD AUTO: 43.4 % (ref 40–54)
HGB BLD-MCNC: 14.6 G/DL (ref 14–18)
LYMPHOCYTES # BLD AUTO: 1.3 THOU/UL (ref 0.8–4.4)
LYMPHOCYTES NFR BLD AUTO: 24 % (ref 20–40)
MCH RBC QN AUTO: 31.7 PG (ref 27–34)
MCHC RBC AUTO-ENTMCNC: 33.6 G/DL (ref 32–36)
MCV RBC AUTO: 94 FL (ref 80–100)
METHADONE UR QL SCN: NORMAL
MONOCYTES # BLD AUTO: 0.4 THOU/UL (ref 0–0.9)
MONOCYTES NFR BLD AUTO: 8 % (ref 2–10)
NEUTROPHILS # BLD AUTO: 3.5 THOU/UL (ref 2–7.7)
NEUTROPHILS NFR BLD AUTO: 66 % (ref 50–70)
OPIATES UR QL SCN: NORMAL
OXYCODONE UR QL: NORMAL
PCP UR QL SCN: NORMAL
PLATELET # BLD AUTO: 94 THOU/UL (ref 140–440)
PMV BLD AUTO: 10.7 FL (ref 8.5–12.5)
PROT SERPL-MCNC: 8.3 G/DL (ref 6–8)
RBC # BLD AUTO: 4.6 MILL/UL (ref 4.4–6.2)
WBC: 5.3 THOU/UL (ref 4–11)

## 2019-05-28 ASSESSMENT — MIFFLIN-ST. JEOR: SCORE: 1561.85

## 2019-05-31 LAB
ETHYL GLUCURONIDE UR CFM-MCNC: NORMAL NG/ML
ETHYL SULFATE UR CFM-MCNC: NORMAL NG/ML

## 2019-06-05 ENCOUNTER — COMMUNICATION - HEALTHEAST (OUTPATIENT)
Dept: BEHAVIORAL HEALTH | Facility: CLINIC | Age: 69
End: 2019-06-05

## 2019-06-09 ENCOUNTER — COMMUNICATION - HEALTHEAST (OUTPATIENT)
Dept: BEHAVIORAL HEALTH | Facility: CLINIC | Age: 69
End: 2019-06-09

## 2019-06-09 DIAGNOSIS — F11.21 OPIOID USE DISORDER, SEVERE, IN EARLY REMISSION, ON MAINTENANCE THERAPY, DEPENDENCE (H): ICD-10-CM

## 2019-06-09 DIAGNOSIS — F10.20 ALCOHOL USE DISORDER, SEVERE, DEPENDENCE (H): ICD-10-CM

## 2019-06-09 DIAGNOSIS — F32.A DEPRESSION: ICD-10-CM

## 2019-06-11 ENCOUNTER — COMMUNICATION - HEALTHEAST (OUTPATIENT)
Dept: BEHAVIORAL HEALTH | Facility: CLINIC | Age: 69
End: 2019-06-11

## 2019-06-11 DIAGNOSIS — F32.A DEPRESSION: ICD-10-CM

## 2019-06-12 ENCOUNTER — COMMUNICATION - HEALTHEAST (OUTPATIENT)
Dept: BEHAVIORAL HEALTH | Facility: CLINIC | Age: 69
End: 2019-06-12

## 2019-07-19 ENCOUNTER — COMMUNICATION - HEALTHEAST (OUTPATIENT)
Dept: BEHAVIORAL HEALTH | Facility: CLINIC | Age: 69
End: 2019-07-19

## 2019-07-19 DIAGNOSIS — F11.21 OPIOID USE DISORDER, SEVERE, IN EARLY REMISSION, ON MAINTENANCE THERAPY, DEPENDENCE (H): ICD-10-CM

## 2019-07-26 ENCOUNTER — OFFICE VISIT - HEALTHEAST (OUTPATIENT)
Dept: BEHAVIORAL HEALTH | Facility: CLINIC | Age: 69
End: 2019-07-26

## 2019-07-26 DIAGNOSIS — F10.20 SEVERE ALCOHOL USE DISORDER (H): ICD-10-CM

## 2019-07-26 DIAGNOSIS — F55.8 ABUSE OF OTHER NON-PSYCHOACTIVE SUBSTANCES: ICD-10-CM

## 2019-07-26 DIAGNOSIS — F33.1 MODERATE EPISODE OF RECURRENT MAJOR DEPRESSIVE DISORDER (H): ICD-10-CM

## 2019-07-26 DIAGNOSIS — F11.20 OPIOID USE DISORDER, SEVERE, DEPENDENCE (H): ICD-10-CM

## 2019-07-26 DIAGNOSIS — F10.20 ALCOHOL USE DISORDER, SEVERE, DEPENDENCE (H): ICD-10-CM

## 2019-07-26 DIAGNOSIS — F11.21 OPIOID USE DISORDER, SEVERE, IN EARLY REMISSION, ON MAINTENANCE THERAPY, DEPENDENCE (H): ICD-10-CM

## 2019-07-26 LAB
AMPHETAMINES UR QL SCN: NORMAL
BARBITURATES UR QL: NORMAL
BENZODIAZ UR QL: NORMAL
CANNABINOIDS UR QL SCN: NORMAL
COCAINE UR QL: NORMAL
CREAT UR-MCNC: 93.8 MG/DL
METHADONE UR QL SCN: NORMAL
OPIATES UR QL SCN: NORMAL
OXYCODONE UR QL: NORMAL
PCP UR QL SCN: NORMAL

## 2019-07-26 ASSESSMENT — MIFFLIN-ST. JEOR: SCORE: 1552.78

## 2019-07-31 LAB
ETHYL GLUCURONIDE UR CFM-MCNC: <100 NG/ML
ETHYL SULFATE UR CFM-MCNC: <100 NG/ML

## 2019-08-12 ENCOUNTER — COMMUNICATION - HEALTHEAST (OUTPATIENT)
Dept: BEHAVIORAL HEALTH | Facility: CLINIC | Age: 69
End: 2019-08-12

## 2019-08-12 DIAGNOSIS — F10.20 ALCOHOL USE DISORDER, SEVERE, DEPENDENCE (H): ICD-10-CM

## 2019-08-20 ENCOUNTER — COMMUNICATION - HEALTHEAST (OUTPATIENT)
Dept: BEHAVIORAL HEALTH | Facility: CLINIC | Age: 69
End: 2019-08-20

## 2019-08-20 DIAGNOSIS — F11.21 OPIOID USE DISORDER, SEVERE, IN EARLY REMISSION, ON MAINTENANCE THERAPY, DEPENDENCE (H): ICD-10-CM

## 2019-08-20 DIAGNOSIS — F10.20 ALCOHOL USE DISORDER, SEVERE, DEPENDENCE (H): ICD-10-CM

## 2019-08-25 ENCOUNTER — COMMUNICATION - HEALTHEAST (OUTPATIENT)
Dept: BEHAVIORAL HEALTH | Facility: CLINIC | Age: 69
End: 2019-08-25

## 2019-08-25 DIAGNOSIS — F32.A DEPRESSION: ICD-10-CM

## 2019-08-30 ENCOUNTER — OFFICE VISIT - HEALTHEAST (OUTPATIENT)
Dept: BEHAVIORAL HEALTH | Facility: CLINIC | Age: 69
End: 2019-08-30

## 2019-08-30 ENCOUNTER — COMMUNICATION - HEALTHEAST (OUTPATIENT)
Dept: BEHAVIORAL HEALTH | Facility: CLINIC | Age: 69
End: 2019-08-30

## 2019-08-30 DIAGNOSIS — F55.8 ABUSE OF OTHER NON-PSYCHOACTIVE SUBSTANCES: ICD-10-CM

## 2019-08-30 DIAGNOSIS — F32.0 MILD SINGLE CURRENT EPISODE OF MAJOR DEPRESSIVE DISORDER (H): ICD-10-CM

## 2019-08-30 DIAGNOSIS — F11.21 OPIOID USE DISORDER, SEVERE, IN EARLY REMISSION, ON MAINTENANCE THERAPY, DEPENDENCE (H): ICD-10-CM

## 2019-08-30 LAB
AMPHETAMINES UR QL SCN: NORMAL
BARBITURATES UR QL: NORMAL
BENZODIAZ UR QL: NORMAL
BUPRENORPHINE QUAL URINE LHE: ABNORMAL
CANNABINOIDS UR QL SCN: NORMAL
COCAINE UR QL: NORMAL
CREAT UR-MCNC: 127.7 MG/DL
CREAT UR-MCNC: 134.1 MG/DL
METHADONE UR QL SCN: NORMAL
OPIATES UR QL SCN: NORMAL
OXYCODONE UR QL: NORMAL
PCP UR QL SCN: NORMAL

## 2019-08-30 ASSESSMENT — MIFFLIN-ST. JEOR: SCORE: 1534.64

## 2019-09-04 ENCOUNTER — COMMUNICATION - HEALTHEAST (OUTPATIENT)
Dept: BEHAVIORAL HEALTH | Facility: CLINIC | Age: 69
End: 2019-09-04

## 2019-09-04 LAB
ETHYL GLUCURONIDE UR CFM-MCNC: NORMAL NG/ML
ETHYL SULFATE UR CFM-MCNC: NORMAL NG/ML

## 2019-09-10 ENCOUNTER — COMMUNICATION - HEALTHEAST (OUTPATIENT)
Dept: BEHAVIORAL HEALTH | Facility: CLINIC | Age: 69
End: 2019-09-10

## 2019-09-13 ENCOUNTER — OFFICE VISIT - HEALTHEAST (OUTPATIENT)
Dept: BEHAVIORAL HEALTH | Facility: CLINIC | Age: 69
End: 2019-09-13

## 2019-09-13 ENCOUNTER — COMMUNICATION - HEALTHEAST (OUTPATIENT)
Dept: BEHAVIORAL HEALTH | Facility: CLINIC | Age: 69
End: 2019-09-13

## 2019-09-13 DIAGNOSIS — F33.1 MODERATE EPISODE OF RECURRENT MAJOR DEPRESSIVE DISORDER (H): ICD-10-CM

## 2019-09-13 DIAGNOSIS — F11.20 OPIOID USE DISORDER, SEVERE, DEPENDENCE (H): ICD-10-CM

## 2019-09-13 DIAGNOSIS — F55.8 ABUSE OF OTHER NON-PSYCHOACTIVE SUBSTANCES: ICD-10-CM

## 2019-09-13 DIAGNOSIS — F17.200 NICOTINE USE DISORDER: ICD-10-CM

## 2019-09-13 DIAGNOSIS — F32.A DEPRESSION: ICD-10-CM

## 2019-09-13 DIAGNOSIS — F11.21 OPIOID USE DISORDER, SEVERE, IN EARLY REMISSION, ON MAINTENANCE THERAPY, DEPENDENCE (H): ICD-10-CM

## 2019-09-13 DIAGNOSIS — F10.20 ALCOHOL USE DISORDER, SEVERE, DEPENDENCE (H): ICD-10-CM

## 2019-09-13 DIAGNOSIS — F10.20 SEVERE ALCOHOL USE DISORDER (H): ICD-10-CM

## 2019-09-13 LAB
AMPHETAMINES UR QL SCN: NORMAL
BARBITURATES UR QL: NORMAL
BENZODIAZ UR QL: NORMAL
CANNABINOIDS UR QL SCN: NORMAL
COCAINE UR QL: NORMAL
CREAT UR-MCNC: 68.7 MG/DL
METHADONE UR QL SCN: NORMAL
OPIATES UR QL SCN: NORMAL
OXYCODONE UR QL: NORMAL
PCP UR QL SCN: NORMAL

## 2019-09-13 ASSESSMENT — ANXIETY QUESTIONNAIRES
4. TROUBLE RELAXING: SEVERAL DAYS
2. NOT BEING ABLE TO STOP OR CONTROL WORRYING: NOT AT ALL
6. BECOMING EASILY ANNOYED OR IRRITABLE: NOT AT ALL
3. WORRYING TOO MUCH ABOUT DIFFERENT THINGS: SEVERAL DAYS
IF YOU CHECKED OFF ANY PROBLEMS ON THIS QUESTIONNAIRE, HOW DIFFICULT HAVE THESE PROBLEMS MADE IT FOR YOU TO DO YOUR WORK, TAKE CARE OF THINGS AT HOME, OR GET ALONG WITH OTHER PEOPLE: SOMEWHAT DIFFICULT
5. BEING SO RESTLESS THAT IT IS HARD TO SIT STILL: SEVERAL DAYS
GAD7 TOTAL SCORE: 4
7. FEELING AFRAID AS IF SOMETHING AWFUL MIGHT HAPPEN: NOT AT ALL
1. FEELING NERVOUS, ANXIOUS, OR ON EDGE: SEVERAL DAYS

## 2019-09-13 ASSESSMENT — PATIENT HEALTH QUESTIONNAIRE - PHQ9: SUM OF ALL RESPONSES TO PHQ QUESTIONS 1-9: 7

## 2019-09-13 ASSESSMENT — MIFFLIN-ST. JEOR: SCORE: 1552.78

## 2019-09-16 LAB
ETHYL GLUCURONIDE UR CFM-MCNC: 7880 NG/ML
ETHYL SULFATE UR CFM-MCNC: 4750 NG/ML

## 2019-10-01 ENCOUNTER — COMMUNICATION - HEALTHEAST (OUTPATIENT)
Dept: BEHAVIORAL HEALTH | Facility: CLINIC | Age: 69
End: 2019-10-01

## 2019-10-04 ENCOUNTER — OFFICE VISIT - HEALTHEAST (OUTPATIENT)
Dept: BEHAVIORAL HEALTH | Facility: CLINIC | Age: 69
End: 2019-10-04

## 2019-10-04 DIAGNOSIS — F55.8 ABUSE OF OTHER NON-PSYCHOACTIVE SUBSTANCES: ICD-10-CM

## 2019-10-04 DIAGNOSIS — F11.21 OPIOID USE DISORDER, SEVERE, IN EARLY REMISSION, ON MAINTENANCE THERAPY, DEPENDENCE (H): ICD-10-CM

## 2019-10-04 LAB
AMPHETAMINES UR QL SCN: NORMAL
BARBITURATES UR QL: NORMAL
BENZODIAZ UR QL: NORMAL
CANNABINOIDS UR QL SCN: NORMAL
COCAINE UR QL: NORMAL
CREAT UR-MCNC: 127.5 MG/DL
METHADONE UR QL SCN: NORMAL
OPIATES UR QL SCN: NORMAL
OXYCODONE UR QL: NORMAL
PCP UR QL SCN: NORMAL

## 2019-10-04 ASSESSMENT — ANXIETY QUESTIONNAIRES
6. BECOMING EASILY ANNOYED OR IRRITABLE: SEVERAL DAYS
2. NOT BEING ABLE TO STOP OR CONTROL WORRYING: SEVERAL DAYS
IF YOU CHECKED OFF ANY PROBLEMS ON THIS QUESTIONNAIRE, HOW DIFFICULT HAVE THESE PROBLEMS MADE IT FOR YOU TO DO YOUR WORK, TAKE CARE OF THINGS AT HOME, OR GET ALONG WITH OTHER PEOPLE: SOMEWHAT DIFFICULT
GAD7 TOTAL SCORE: 5
5. BEING SO RESTLESS THAT IT IS HARD TO SIT STILL: NOT AT ALL
4. TROUBLE RELAXING: SEVERAL DAYS
3. WORRYING TOO MUCH ABOUT DIFFERENT THINGS: SEVERAL DAYS
7. FEELING AFRAID AS IF SOMETHING AWFUL MIGHT HAPPEN: NOT AT ALL
1. FEELING NERVOUS, ANXIOUS, OR ON EDGE: SEVERAL DAYS

## 2019-10-04 ASSESSMENT — MIFFLIN-ST. JEOR: SCORE: 1539.17

## 2019-10-04 ASSESSMENT — PATIENT HEALTH QUESTIONNAIRE - PHQ9: SUM OF ALL RESPONSES TO PHQ QUESTIONS 1-9: 6

## 2019-10-08 ENCOUNTER — COMMUNICATION - HEALTHEAST (OUTPATIENT)
Dept: BEHAVIORAL HEALTH | Facility: CLINIC | Age: 69
End: 2019-10-08

## 2019-10-08 DIAGNOSIS — F32.0 MILD SINGLE CURRENT EPISODE OF MAJOR DEPRESSIVE DISORDER (H): ICD-10-CM

## 2019-10-09 ENCOUNTER — COMMUNICATION - HEALTHEAST (OUTPATIENT)
Dept: BEHAVIORAL HEALTH | Facility: CLINIC | Age: 69
End: 2019-10-09

## 2019-10-09 DIAGNOSIS — F11.21 OPIOID USE DISORDER, SEVERE, IN EARLY REMISSION, ON MAINTENANCE THERAPY, DEPENDENCE (H): ICD-10-CM

## 2019-10-10 LAB
ETHYL GLUCURONIDE UR CFM-MCNC: NORMAL NG/ML
ETHYL SULFATE UR CFM-MCNC: <100 NG/ML

## 2019-10-27 ENCOUNTER — COMMUNICATION - HEALTHEAST (OUTPATIENT)
Dept: BEHAVIORAL HEALTH | Facility: CLINIC | Age: 69
End: 2019-10-27

## 2019-10-27 DIAGNOSIS — F10.20 ALCOHOL USE DISORDER, SEVERE, DEPENDENCE (H): ICD-10-CM

## 2019-10-27 DIAGNOSIS — F11.21 OPIOID USE DISORDER, SEVERE, IN EARLY REMISSION, ON MAINTENANCE THERAPY, DEPENDENCE (H): ICD-10-CM

## 2019-10-27 DIAGNOSIS — F32.A DEPRESSION: ICD-10-CM

## 2019-11-15 ENCOUNTER — COMMUNICATION - HEALTHEAST (OUTPATIENT)
Dept: BEHAVIORAL HEALTH | Facility: CLINIC | Age: 69
End: 2019-11-15

## 2019-11-21 ENCOUNTER — COMMUNICATION - HEALTHEAST (OUTPATIENT)
Dept: BEHAVIORAL HEALTH | Facility: CLINIC | Age: 69
End: 2019-11-21

## 2019-11-26 ENCOUNTER — COMMUNICATION - HEALTHEAST (OUTPATIENT)
Dept: BEHAVIORAL HEALTH | Facility: CLINIC | Age: 69
End: 2019-11-26

## 2019-11-26 DIAGNOSIS — F32.A DEPRESSION: ICD-10-CM

## 2019-11-26 DIAGNOSIS — F10.20 ALCOHOL USE DISORDER, SEVERE, DEPENDENCE (H): ICD-10-CM

## 2019-11-26 DIAGNOSIS — F11.21 OPIOID USE DISORDER, SEVERE, IN EARLY REMISSION, ON MAINTENANCE THERAPY, DEPENDENCE (H): ICD-10-CM

## 2019-12-06 ENCOUNTER — OFFICE VISIT - HEALTHEAST (OUTPATIENT)
Dept: ADDICTION MEDICINE | Facility: CLINIC | Age: 69
End: 2019-12-06

## 2019-12-06 ENCOUNTER — OFFICE VISIT - HEALTHEAST (OUTPATIENT)
Dept: BEHAVIORAL HEALTH | Facility: CLINIC | Age: 69
End: 2019-12-06

## 2019-12-06 DIAGNOSIS — F11.29 OPIOID DEPENDENCE WITH UNSPECIFIED OPIOID-INDUCED DISORDER (H): ICD-10-CM

## 2019-12-06 DIAGNOSIS — F10.20 SEVERE ALCOHOL USE DISORDER (H): ICD-10-CM

## 2019-12-06 DIAGNOSIS — F11.21 OPIOID USE DISORDER, SEVERE, IN EARLY REMISSION, ON MAINTENANCE THERAPY, DEPENDENCE (H): ICD-10-CM

## 2019-12-06 LAB
AMPHETAMINES UR QL SCN: NORMAL
BARBITURATES UR QL: NORMAL
BENZODIAZ UR QL: NORMAL
BUPRENORPHINE QUAL URINE LHE: ABNORMAL
CANNABINOIDS UR QL SCN: NORMAL
COCAINE UR QL: NORMAL
CREAT UR-MCNC: 81 MG/DL
CREAT UR-MCNC: 83.5 MG/DL
METHADONE UR QL SCN: NORMAL
OPIATES UR QL SCN: NORMAL
OXYCODONE UR QL: NORMAL
PCP UR QL SCN: NORMAL

## 2019-12-06 ASSESSMENT — ANXIETY QUESTIONNAIRES
6. BECOMING EASILY ANNOYED OR IRRITABLE: SEVERAL DAYS
4. TROUBLE RELAXING: SEVERAL DAYS
5. BEING SO RESTLESS THAT IT IS HARD TO SIT STILL: NOT AT ALL
3. WORRYING TOO MUCH ABOUT DIFFERENT THINGS: SEVERAL DAYS
1. FEELING NERVOUS, ANXIOUS, OR ON EDGE: SEVERAL DAYS
GAD7 TOTAL SCORE: 6
7. FEELING AFRAID AS IF SOMETHING AWFUL MIGHT HAPPEN: SEVERAL DAYS
2. NOT BEING ABLE TO STOP OR CONTROL WORRYING: SEVERAL DAYS

## 2019-12-06 ASSESSMENT — PATIENT HEALTH QUESTIONNAIRE - PHQ9: SUM OF ALL RESPONSES TO PHQ QUESTIONS 1-9: 9

## 2019-12-06 ASSESSMENT — MIFFLIN-ST. JEOR: SCORE: 1530.1

## 2019-12-09 LAB
ETHYL GLUCURONIDE UR CFM-MCNC: NORMAL NG/ML
ETHYL SULFATE UR CFM-MCNC: NORMAL NG/ML

## 2019-12-30 ENCOUNTER — COMMUNICATION - HEALTHEAST (OUTPATIENT)
Dept: BEHAVIORAL HEALTH | Facility: CLINIC | Age: 69
End: 2019-12-30

## 2019-12-30 DIAGNOSIS — F32.0 MILD SINGLE CURRENT EPISODE OF MAJOR DEPRESSIVE DISORDER (H): ICD-10-CM

## 2020-01-06 ENCOUNTER — COMMUNICATION - HEALTHEAST (OUTPATIENT)
Dept: BEHAVIORAL HEALTH | Facility: CLINIC | Age: 70
End: 2020-01-06

## 2020-01-06 DIAGNOSIS — F32.A DEPRESSION: ICD-10-CM

## 2020-01-08 ENCOUNTER — COMMUNICATION - HEALTHEAST (OUTPATIENT)
Dept: BEHAVIORAL HEALTH | Facility: CLINIC | Age: 70
End: 2020-01-08

## 2020-01-08 DIAGNOSIS — F10.20 ALCOHOL USE DISORDER, SEVERE, DEPENDENCE (H): ICD-10-CM

## 2020-01-08 DIAGNOSIS — F11.21 OPIOID USE DISORDER, SEVERE, IN EARLY REMISSION, ON MAINTENANCE THERAPY, DEPENDENCE (H): ICD-10-CM

## 2020-01-31 ENCOUNTER — OFFICE VISIT - HEALTHEAST (OUTPATIENT)
Dept: BEHAVIORAL HEALTH | Facility: CLINIC | Age: 70
End: 2020-01-31

## 2020-01-31 DIAGNOSIS — F06.4 ANXIETY DISORDER DUE TO MEDICAL CONDITION: ICD-10-CM

## 2020-01-31 DIAGNOSIS — F10.20 ALCOHOL USE DISORDER, SEVERE, DEPENDENCE (H): ICD-10-CM

## 2020-01-31 DIAGNOSIS — F10.20 SEVERE ALCOHOL USE DISORDER (H): ICD-10-CM

## 2020-01-31 DIAGNOSIS — F11.21 OPIOID USE DISORDER, SEVERE, IN EARLY REMISSION, ON MAINTENANCE THERAPY, DEPENDENCE (H): ICD-10-CM

## 2020-01-31 DIAGNOSIS — F11.29 OPIOID DEPENDENCE WITH UNSPECIFIED OPIOID-INDUCED DISORDER (H): ICD-10-CM

## 2020-01-31 LAB
AMPHETAMINES UR QL SCN: ABNORMAL
BARBITURATES UR QL: ABNORMAL
BENZODIAZ UR QL: ABNORMAL
CANNABINOIDS UR QL SCN: ABNORMAL
COCAINE UR QL: ABNORMAL
CREAT UR-MCNC: 101.1 MG/DL
METHADONE UR QL SCN: ABNORMAL
OPIATES UR QL SCN: ABNORMAL
OXYCODONE UR QL: ABNORMAL
PCP UR QL SCN: ABNORMAL

## 2020-01-31 ASSESSMENT — MIFFLIN-ST. JEOR: SCORE: 1534.64

## 2020-01-31 ASSESSMENT — ANXIETY QUESTIONNAIRES
1. FEELING NERVOUS, ANXIOUS, OR ON EDGE: MORE THAN HALF THE DAYS
5. BEING SO RESTLESS THAT IT IS HARD TO SIT STILL: NOT AT ALL
GAD7 TOTAL SCORE: 8
2. NOT BEING ABLE TO STOP OR CONTROL WORRYING: SEVERAL DAYS
4. TROUBLE RELAXING: SEVERAL DAYS
3. WORRYING TOO MUCH ABOUT DIFFERENT THINGS: SEVERAL DAYS
7. FEELING AFRAID AS IF SOMETHING AWFUL MIGHT HAPPEN: MORE THAN HALF THE DAYS
6. BECOMING EASILY ANNOYED OR IRRITABLE: SEVERAL DAYS

## 2020-01-31 ASSESSMENT — PATIENT HEALTH QUESTIONNAIRE - PHQ9: SUM OF ALL RESPONSES TO PHQ QUESTIONS 1-9: 12

## 2020-02-03 LAB
ETHYL GLUCURONIDE UR CFM-MCNC: <100 NG/ML
ETHYL SULFATE UR CFM-MCNC: <100 NG/ML

## 2020-02-05 ENCOUNTER — COMMUNICATION - HEALTHEAST (OUTPATIENT)
Dept: BEHAVIORAL HEALTH | Facility: CLINIC | Age: 70
End: 2020-02-05

## 2020-02-05 DIAGNOSIS — F11.21 OPIOID USE DISORDER, SEVERE, IN EARLY REMISSION, ON MAINTENANCE THERAPY, DEPENDENCE (H): ICD-10-CM

## 2020-02-05 DIAGNOSIS — F10.20 ALCOHOL USE DISORDER, SEVERE, DEPENDENCE (H): ICD-10-CM

## 2020-03-17 ENCOUNTER — COMMUNICATION - HEALTHEAST (OUTPATIENT)
Dept: BEHAVIORAL HEALTH | Facility: CLINIC | Age: 70
End: 2020-03-17

## 2020-03-17 DIAGNOSIS — F32.A DEPRESSION: ICD-10-CM

## 2020-03-17 DIAGNOSIS — F11.21 OPIOID USE DISORDER, SEVERE, IN EARLY REMISSION, ON MAINTENANCE THERAPY, DEPENDENCE (H): ICD-10-CM

## 2020-03-27 ENCOUNTER — OFFICE VISIT - HEALTHEAST (OUTPATIENT)
Dept: BEHAVIORAL HEALTH | Facility: CLINIC | Age: 70
End: 2020-03-27

## 2020-03-27 DIAGNOSIS — F11.21 OPIOID USE DISORDER, SEVERE, IN EARLY REMISSION, ON MAINTENANCE THERAPY, DEPENDENCE (H): ICD-10-CM

## 2020-03-28 ENCOUNTER — COMMUNICATION - HEALTHEAST (OUTPATIENT)
Dept: BEHAVIORAL HEALTH | Facility: CLINIC | Age: 70
End: 2020-03-28

## 2020-03-28 DIAGNOSIS — F32.0 MILD SINGLE CURRENT EPISODE OF MAJOR DEPRESSIVE DISORDER (H): ICD-10-CM

## 2020-03-30 ENCOUNTER — COMMUNICATION - HEALTHEAST (OUTPATIENT)
Dept: BEHAVIORAL HEALTH | Facility: CLINIC | Age: 70
End: 2020-03-30

## 2020-03-30 DIAGNOSIS — F10.20 SEVERE ALCOHOL USE DISORDER (H): ICD-10-CM

## 2020-03-30 DIAGNOSIS — F06.4 ANXIETY DISORDER DUE TO MEDICAL CONDITION: ICD-10-CM

## 2020-05-08 ENCOUNTER — COMMUNICATION - HEALTHEAST (OUTPATIENT)
Dept: BEHAVIORAL HEALTH | Facility: CLINIC | Age: 70
End: 2020-05-08

## 2020-05-08 DIAGNOSIS — F06.4 ANXIETY DISORDER DUE TO MEDICAL CONDITION: ICD-10-CM

## 2020-05-08 DIAGNOSIS — F10.20 SEVERE ALCOHOL USE DISORDER (H): ICD-10-CM

## 2020-05-12 ENCOUNTER — COMMUNICATION - HEALTHEAST (OUTPATIENT)
Dept: BEHAVIORAL HEALTH | Facility: CLINIC | Age: 70
End: 2020-05-12

## 2020-05-12 DIAGNOSIS — F10.20 SEVERE ALCOHOL USE DISORDER (H): ICD-10-CM

## 2020-05-12 DIAGNOSIS — F06.4 ANXIETY DISORDER DUE TO MEDICAL CONDITION: ICD-10-CM

## 2020-05-20 ENCOUNTER — COMMUNICATION - HEALTHEAST (OUTPATIENT)
Dept: BEHAVIORAL HEALTH | Facility: CLINIC | Age: 70
End: 2020-05-20

## 2020-05-20 DIAGNOSIS — F43.10 POST TRAUMATIC STRESS DISORDER: ICD-10-CM

## 2020-06-03 ENCOUNTER — COMMUNICATION - HEALTHEAST (OUTPATIENT)
Dept: BEHAVIORAL HEALTH | Facility: CLINIC | Age: 70
End: 2020-06-03

## 2020-06-03 DIAGNOSIS — F32.A DEPRESSION: ICD-10-CM

## 2020-06-15 ENCOUNTER — COMMUNICATION - HEALTHEAST (OUTPATIENT)
Dept: BEHAVIORAL HEALTH | Facility: CLINIC | Age: 70
End: 2020-06-15

## 2020-06-16 ENCOUNTER — OFFICE VISIT - HEALTHEAST (OUTPATIENT)
Dept: BEHAVIORAL HEALTH | Facility: CLINIC | Age: 70
End: 2020-06-16

## 2020-06-16 DIAGNOSIS — F11.21 OPIOID USE DISORDER, SEVERE, IN EARLY REMISSION, ON MAINTENANCE THERAPY, DEPENDENCE (H): ICD-10-CM

## 2020-06-16 DIAGNOSIS — F10.20 SEVERE ALCOHOL USE DISORDER (H): ICD-10-CM

## 2020-06-16 DIAGNOSIS — F06.4 ANXIETY DISORDER DUE TO MEDICAL CONDITION: ICD-10-CM

## 2020-06-16 ASSESSMENT — ANXIETY QUESTIONNAIRES
6. BECOMING EASILY ANNOYED OR IRRITABLE: MORE THAN HALF THE DAYS
5. BEING SO RESTLESS THAT IT IS HARD TO SIT STILL: SEVERAL DAYS
2. NOT BEING ABLE TO STOP OR CONTROL WORRYING: SEVERAL DAYS
7. FEELING AFRAID AS IF SOMETHING AWFUL MIGHT HAPPEN: MORE THAN HALF THE DAYS
4. TROUBLE RELAXING: SEVERAL DAYS
1. FEELING NERVOUS, ANXIOUS, OR ON EDGE: SEVERAL DAYS
GAD7 TOTAL SCORE: 11
3. WORRYING TOO MUCH ABOUT DIFFERENT THINGS: NEARLY EVERY DAY

## 2020-06-16 ASSESSMENT — PATIENT HEALTH QUESTIONNAIRE - PHQ9: SUM OF ALL RESPONSES TO PHQ QUESTIONS 1-9: 9

## 2020-06-21 ENCOUNTER — COMMUNICATION - HEALTHEAST (OUTPATIENT)
Dept: BEHAVIORAL HEALTH | Facility: CLINIC | Age: 70
End: 2020-06-21

## 2020-06-21 DIAGNOSIS — F32.A DEPRESSION: ICD-10-CM

## 2020-06-30 ENCOUNTER — OFFICE VISIT - HEALTHEAST (OUTPATIENT)
Dept: BEHAVIORAL HEALTH | Facility: CLINIC | Age: 70
End: 2020-06-30

## 2020-06-30 DIAGNOSIS — F32.0 MILD SINGLE CURRENT EPISODE OF MAJOR DEPRESSIVE DISORDER (H): ICD-10-CM

## 2020-06-30 DIAGNOSIS — F11.21 OPIOID USE DISORDER, SEVERE, IN EARLY REMISSION, ON MAINTENANCE THERAPY, DEPENDENCE (H): ICD-10-CM

## 2020-06-30 DIAGNOSIS — F43.10 POST TRAUMATIC STRESS DISORDER: ICD-10-CM

## 2020-06-30 DIAGNOSIS — F10.20 ALCOHOL USE DISORDER, SEVERE, DEPENDENCE (H): ICD-10-CM

## 2020-06-30 ASSESSMENT — ANXIETY QUESTIONNAIRES
5. BEING SO RESTLESS THAT IT IS HARD TO SIT STILL: SEVERAL DAYS
IF YOU CHECKED OFF ANY PROBLEMS ON THIS QUESTIONNAIRE, HOW DIFFICULT HAVE THESE PROBLEMS MADE IT FOR YOU TO DO YOUR WORK, TAKE CARE OF THINGS AT HOME, OR GET ALONG WITH OTHER PEOPLE: SOMEWHAT DIFFICULT
3. WORRYING TOO MUCH ABOUT DIFFERENT THINGS: MORE THAN HALF THE DAYS
6. BECOMING EASILY ANNOYED OR IRRITABLE: MORE THAN HALF THE DAYS
4. TROUBLE RELAXING: SEVERAL DAYS
GAD7 TOTAL SCORE: 9
1. FEELING NERVOUS, ANXIOUS, OR ON EDGE: SEVERAL DAYS
7. FEELING AFRAID AS IF SOMETHING AWFUL MIGHT HAPPEN: SEVERAL DAYS
2. NOT BEING ABLE TO STOP OR CONTROL WORRYING: SEVERAL DAYS

## 2020-06-30 ASSESSMENT — PATIENT HEALTH QUESTIONNAIRE - PHQ9: SUM OF ALL RESPONSES TO PHQ QUESTIONS 1-9: 12

## 2020-07-02 ENCOUNTER — COMMUNICATION - HEALTHEAST (OUTPATIENT)
Dept: BEHAVIORAL HEALTH | Facility: CLINIC | Age: 70
End: 2020-07-02

## 2020-07-19 ENCOUNTER — COMMUNICATION - HEALTHEAST (OUTPATIENT)
Dept: BEHAVIORAL HEALTH | Facility: CLINIC | Age: 70
End: 2020-07-19

## 2020-07-19 DIAGNOSIS — F10.20 SEVERE ALCOHOL USE DISORDER (H): ICD-10-CM

## 2020-07-19 DIAGNOSIS — F06.4 ANXIETY DISORDER DUE TO MEDICAL CONDITION: ICD-10-CM

## 2020-07-21 ENCOUNTER — COMMUNICATION - HEALTHEAST (OUTPATIENT)
Dept: BEHAVIORAL HEALTH | Facility: CLINIC | Age: 70
End: 2020-07-21

## 2020-07-21 ENCOUNTER — OFFICE VISIT - HEALTHEAST (OUTPATIENT)
Dept: BEHAVIORAL HEALTH | Facility: CLINIC | Age: 70
End: 2020-07-21

## 2020-07-21 DIAGNOSIS — F32.A DEPRESSION: ICD-10-CM

## 2020-07-21 DIAGNOSIS — F11.21 OPIOID USE DISORDER, SEVERE, IN EARLY REMISSION, ON MAINTENANCE THERAPY, DEPENDENCE (H): ICD-10-CM

## 2020-07-21 DIAGNOSIS — F10.20 SEVERE ALCOHOL USE DISORDER (H): ICD-10-CM

## 2020-07-21 DIAGNOSIS — F06.4 ANXIETY DISORDER DUE TO MEDICAL CONDITION: ICD-10-CM

## 2020-07-21 ASSESSMENT — ANXIETY QUESTIONNAIRES
GAD7 TOTAL SCORE: 12
4. TROUBLE RELAXING: MORE THAN HALF THE DAYS
2. NOT BEING ABLE TO STOP OR CONTROL WORRYING: SEVERAL DAYS
3. WORRYING TOO MUCH ABOUT DIFFERENT THINGS: SEVERAL DAYS
6. BECOMING EASILY ANNOYED OR IRRITABLE: MORE THAN HALF THE DAYS
7. FEELING AFRAID AS IF SOMETHING AWFUL MIGHT HAPPEN: MORE THAN HALF THE DAYS
IF YOU CHECKED OFF ANY PROBLEMS ON THIS QUESTIONNAIRE, HOW DIFFICULT HAVE THESE PROBLEMS MADE IT FOR YOU TO DO YOUR WORK, TAKE CARE OF THINGS AT HOME, OR GET ALONG WITH OTHER PEOPLE: SOMEWHAT DIFFICULT
5. BEING SO RESTLESS THAT IT IS HARD TO SIT STILL: MORE THAN HALF THE DAYS
1. FEELING NERVOUS, ANXIOUS, OR ON EDGE: MORE THAN HALF THE DAYS

## 2020-07-21 ASSESSMENT — PATIENT HEALTH QUESTIONNAIRE - PHQ9: SUM OF ALL RESPONSES TO PHQ QUESTIONS 1-9: 12

## 2020-07-24 ENCOUNTER — COMMUNICATION - HEALTHEAST (OUTPATIENT)
Dept: BEHAVIORAL HEALTH | Facility: CLINIC | Age: 70
End: 2020-07-24

## 2020-07-24 DIAGNOSIS — F32.0 MILD SINGLE CURRENT EPISODE OF MAJOR DEPRESSIVE DISORDER (H): ICD-10-CM

## 2020-08-13 ENCOUNTER — COMMUNICATION - HEALTHEAST (OUTPATIENT)
Dept: BEHAVIORAL HEALTH | Facility: CLINIC | Age: 70
End: 2020-08-13

## 2020-08-13 DIAGNOSIS — F32.A DEPRESSION: ICD-10-CM

## 2020-08-14 ENCOUNTER — COMMUNICATION - HEALTHEAST (OUTPATIENT)
Dept: BEHAVIORAL HEALTH | Facility: CLINIC | Age: 70
End: 2020-08-14

## 2020-08-14 DIAGNOSIS — F32.A DEPRESSION: ICD-10-CM

## 2020-08-18 ENCOUNTER — OFFICE VISIT - HEALTHEAST (OUTPATIENT)
Dept: BEHAVIORAL HEALTH | Facility: CLINIC | Age: 70
End: 2020-08-18

## 2020-08-18 DIAGNOSIS — F11.21 OPIOID USE DISORDER, SEVERE, IN EARLY REMISSION, ON MAINTENANCE THERAPY, DEPENDENCE (H): ICD-10-CM

## 2020-08-18 DIAGNOSIS — F33.1 MODERATE EPISODE OF RECURRENT MAJOR DEPRESSIVE DISORDER (H): ICD-10-CM

## 2020-08-18 ASSESSMENT — ANXIETY QUESTIONNAIRES
6. BECOMING EASILY ANNOYED OR IRRITABLE: NOT AT ALL
GAD7 TOTAL SCORE: 6
1. FEELING NERVOUS, ANXIOUS, OR ON EDGE: SEVERAL DAYS
4. TROUBLE RELAXING: SEVERAL DAYS
7. FEELING AFRAID AS IF SOMETHING AWFUL MIGHT HAPPEN: NOT AT ALL
IF YOU CHECKED OFF ANY PROBLEMS ON THIS QUESTIONNAIRE, HOW DIFFICULT HAVE THESE PROBLEMS MADE IT FOR YOU TO DO YOUR WORK, TAKE CARE OF THINGS AT HOME, OR GET ALONG WITH OTHER PEOPLE: NOT DIFFICULT AT ALL
3. WORRYING TOO MUCH ABOUT DIFFERENT THINGS: MORE THAN HALF THE DAYS
5. BEING SO RESTLESS THAT IT IS HARD TO SIT STILL: NOT AT ALL
2. NOT BEING ABLE TO STOP OR CONTROL WORRYING: MORE THAN HALF THE DAYS

## 2020-08-18 ASSESSMENT — PATIENT HEALTH QUESTIONNAIRE - PHQ9: SUM OF ALL RESPONSES TO PHQ QUESTIONS 1-9: 5

## 2020-09-16 ENCOUNTER — COMMUNICATION - HEALTHEAST (OUTPATIENT)
Dept: BEHAVIORAL HEALTH | Facility: CLINIC | Age: 70
End: 2020-09-16

## 2020-09-16 DIAGNOSIS — F06.4 ANXIETY DISORDER DUE TO MEDICAL CONDITION: ICD-10-CM

## 2020-09-16 DIAGNOSIS — F10.20 SEVERE ALCOHOL USE DISORDER (H): ICD-10-CM

## 2020-10-16 ENCOUNTER — COMMUNICATION - HEALTHEAST (OUTPATIENT)
Dept: BEHAVIORAL HEALTH | Facility: CLINIC | Age: 70
End: 2020-10-16

## 2020-10-16 ENCOUNTER — OFFICE VISIT - HEALTHEAST (OUTPATIENT)
Dept: ADDICTION MEDICINE | Facility: CLINIC | Age: 70
End: 2020-10-16

## 2020-10-16 DIAGNOSIS — F11.21 OPIOID USE DISORDER, SEVERE, IN EARLY REMISSION, ON MAINTENANCE THERAPY, DEPENDENCE (H): ICD-10-CM

## 2020-10-16 DIAGNOSIS — F12.10 CANNABIS USE DISORDER, MILD, ABUSE: ICD-10-CM

## 2020-10-16 DIAGNOSIS — F10.20 ALCOHOL USE DISORDER, SEVERE, DEPENDENCE (H): ICD-10-CM

## 2020-10-16 DIAGNOSIS — F06.4 ANXIETY DISORDER DUE TO MEDICAL CONDITION: ICD-10-CM

## 2020-10-16 DIAGNOSIS — F10.20 SEVERE ALCOHOL USE DISORDER (H): ICD-10-CM

## 2020-10-18 ENCOUNTER — COMMUNICATION - HEALTHEAST (OUTPATIENT)
Dept: BEHAVIORAL HEALTH | Facility: CLINIC | Age: 70
End: 2020-10-18

## 2020-10-18 DIAGNOSIS — F10.20 SEVERE ALCOHOL USE DISORDER (H): ICD-10-CM

## 2020-10-18 DIAGNOSIS — F06.4 ANXIETY DISORDER DUE TO MEDICAL CONDITION: ICD-10-CM

## 2020-10-19 ENCOUNTER — COMMUNICATION - HEALTHEAST (OUTPATIENT)
Dept: BEHAVIORAL HEALTH | Facility: CLINIC | Age: 70
End: 2020-10-19

## 2020-10-21 ENCOUNTER — COMMUNICATION - HEALTHEAST (OUTPATIENT)
Dept: ADDICTION MEDICINE | Facility: CLINIC | Age: 70
End: 2020-10-21

## 2020-10-21 ENCOUNTER — COMMUNICATION - HEALTHEAST (OUTPATIENT)
Dept: BEHAVIORAL HEALTH | Facility: CLINIC | Age: 70
End: 2020-10-21

## 2020-10-21 DIAGNOSIS — F32.A DEPRESSION: ICD-10-CM

## 2020-11-10 ENCOUNTER — COMMUNICATION - HEALTHEAST (OUTPATIENT)
Dept: ADDICTION MEDICINE | Facility: CLINIC | Age: 70
End: 2020-11-10

## 2020-11-10 ENCOUNTER — OFFICE VISIT - HEALTHEAST (OUTPATIENT)
Dept: ADDICTION MEDICINE | Facility: CLINIC | Age: 70
End: 2020-11-10

## 2020-11-10 DIAGNOSIS — F10.20 ALCOHOL USE DISORDER, SEVERE, DEPENDENCE (H): ICD-10-CM

## 2020-11-10 DIAGNOSIS — F12.10 CANNABIS USE DISORDER, MILD, ABUSE: ICD-10-CM

## 2020-11-18 ENCOUNTER — AMBULATORY - HEALTHEAST (OUTPATIENT)
Dept: ADDICTION MEDICINE | Facility: HOSPITAL | Age: 70
End: 2020-11-18

## 2020-11-18 ENCOUNTER — OFFICE VISIT - HEALTHEAST (OUTPATIENT)
Dept: ADDICTION MEDICINE | Facility: HOSPITAL | Age: 70
End: 2020-11-18

## 2020-11-18 DIAGNOSIS — F10.20 ALCOHOL USE DISORDER, SEVERE, DEPENDENCE (H): ICD-10-CM

## 2020-11-19 ENCOUNTER — OFFICE VISIT - HEALTHEAST (OUTPATIENT)
Dept: ADDICTION MEDICINE | Facility: HOSPITAL | Age: 70
End: 2020-11-19

## 2020-11-19 DIAGNOSIS — F90.1 ATTENTION DEFICIT HYPERACTIVITY DISORDER, HYPERACTIVE-IMPULSIVE TYPE: ICD-10-CM

## 2020-11-20 ENCOUNTER — AMBULATORY - HEALTHEAST (OUTPATIENT)
Dept: ADDICTION MEDICINE | Facility: HOSPITAL | Age: 70
End: 2020-11-20

## 2020-11-23 ENCOUNTER — OFFICE VISIT - HEALTHEAST (OUTPATIENT)
Dept: ADDICTION MEDICINE | Facility: HOSPITAL | Age: 70
End: 2020-11-23

## 2020-11-23 DIAGNOSIS — F10.20 ALCOHOL USE DISORDER, SEVERE, DEPENDENCE (H): ICD-10-CM

## 2020-11-25 ENCOUNTER — AMBULATORY - HEALTHEAST (OUTPATIENT)
Dept: ADDICTION MEDICINE | Facility: HOSPITAL | Age: 70
End: 2020-11-25

## 2020-11-25 ENCOUNTER — OFFICE VISIT - HEALTHEAST (OUTPATIENT)
Dept: ADDICTION MEDICINE | Facility: HOSPITAL | Age: 70
End: 2020-11-25

## 2020-11-25 DIAGNOSIS — F10.20 ALCOHOL USE DISORDER, SEVERE, DEPENDENCE (H): ICD-10-CM

## 2020-11-30 ENCOUNTER — OFFICE VISIT - HEALTHEAST (OUTPATIENT)
Dept: ADDICTION MEDICINE | Facility: HOSPITAL | Age: 70
End: 2020-11-30

## 2020-11-30 DIAGNOSIS — F10.20 ALCOHOL USE DISORDER, SEVERE, DEPENDENCE (H): ICD-10-CM

## 2020-12-02 ENCOUNTER — OFFICE VISIT - HEALTHEAST (OUTPATIENT)
Dept: ADDICTION MEDICINE | Facility: HOSPITAL | Age: 70
End: 2020-12-02

## 2020-12-02 DIAGNOSIS — F10.20 ALCOHOL USE DISORDER, SEVERE, DEPENDENCE (H): ICD-10-CM

## 2020-12-04 ENCOUNTER — OFFICE VISIT - HEALTHEAST (OUTPATIENT)
Dept: ADDICTION MEDICINE | Facility: HOSPITAL | Age: 70
End: 2020-12-04

## 2020-12-04 ENCOUNTER — AMBULATORY - HEALTHEAST (OUTPATIENT)
Dept: ADDICTION MEDICINE | Facility: HOSPITAL | Age: 70
End: 2020-12-04

## 2020-12-04 DIAGNOSIS — F10.20 ALCOHOL USE DISORDER, SEVERE, DEPENDENCE (H): ICD-10-CM

## 2020-12-07 ENCOUNTER — OFFICE VISIT - HEALTHEAST (OUTPATIENT)
Dept: ADDICTION MEDICINE | Facility: HOSPITAL | Age: 70
End: 2020-12-07

## 2020-12-07 DIAGNOSIS — F10.20 ALCOHOL USE DISORDER, SEVERE, DEPENDENCE (H): ICD-10-CM

## 2020-12-09 ENCOUNTER — OFFICE VISIT - HEALTHEAST (OUTPATIENT)
Dept: ADDICTION MEDICINE | Facility: HOSPITAL | Age: 70
End: 2020-12-09

## 2020-12-09 DIAGNOSIS — F10.20 ALCOHOL USE DISORDER, SEVERE, DEPENDENCE (H): ICD-10-CM

## 2020-12-11 ENCOUNTER — OFFICE VISIT - HEALTHEAST (OUTPATIENT)
Dept: ADDICTION MEDICINE | Facility: HOSPITAL | Age: 70
End: 2020-12-11

## 2020-12-11 DIAGNOSIS — F10.20 ALCOHOL USE DISORDER, SEVERE, DEPENDENCE (H): ICD-10-CM

## 2020-12-12 ENCOUNTER — AMBULATORY - HEALTHEAST (OUTPATIENT)
Dept: ADDICTION MEDICINE | Facility: HOSPITAL | Age: 70
End: 2020-12-12

## 2020-12-14 ENCOUNTER — OFFICE VISIT - HEALTHEAST (OUTPATIENT)
Dept: ADDICTION MEDICINE | Facility: HOSPITAL | Age: 70
End: 2020-12-14

## 2020-12-14 DIAGNOSIS — F10.20 ALCOHOL USE DISORDER, SEVERE, DEPENDENCE (H): ICD-10-CM

## 2020-12-16 ENCOUNTER — OFFICE VISIT - HEALTHEAST (OUTPATIENT)
Dept: ADDICTION MEDICINE | Facility: HOSPITAL | Age: 70
End: 2020-12-16

## 2020-12-16 DIAGNOSIS — F10.20 ALCOHOL USE DISORDER, SEVERE, DEPENDENCE (H): ICD-10-CM

## 2020-12-18 ENCOUNTER — AMBULATORY - HEALTHEAST (OUTPATIENT)
Dept: ADDICTION MEDICINE | Facility: HOSPITAL | Age: 70
End: 2020-12-18

## 2020-12-18 ENCOUNTER — OFFICE VISIT - HEALTHEAST (OUTPATIENT)
Dept: ADDICTION MEDICINE | Facility: HOSPITAL | Age: 70
End: 2020-12-18

## 2020-12-18 DIAGNOSIS — F10.20 ALCOHOL USE DISORDER, SEVERE, DEPENDENCE (H): ICD-10-CM

## 2020-12-21 ENCOUNTER — OFFICE VISIT - HEALTHEAST (OUTPATIENT)
Dept: ADDICTION MEDICINE | Facility: HOSPITAL | Age: 70
End: 2020-12-21

## 2020-12-21 DIAGNOSIS — F10.20 ALCOHOL USE DISORDER, SEVERE, DEPENDENCE (H): ICD-10-CM

## 2020-12-23 ENCOUNTER — AMBULATORY - HEALTHEAST (OUTPATIENT)
Dept: ADDICTION MEDICINE | Facility: HOSPITAL | Age: 70
End: 2020-12-23

## 2020-12-23 ENCOUNTER — OFFICE VISIT - HEALTHEAST (OUTPATIENT)
Dept: ADDICTION MEDICINE | Facility: HOSPITAL | Age: 70
End: 2020-12-23

## 2020-12-23 DIAGNOSIS — F10.20 ALCOHOL USE DISORDER, SEVERE, DEPENDENCE (H): ICD-10-CM

## 2020-12-28 ENCOUNTER — AMBULATORY - HEALTHEAST (OUTPATIENT)
Dept: ADDICTION MEDICINE | Facility: HOSPITAL | Age: 70
End: 2020-12-28

## 2020-12-28 ENCOUNTER — OFFICE VISIT - HEALTHEAST (OUTPATIENT)
Dept: ADDICTION MEDICINE | Facility: HOSPITAL | Age: 70
End: 2020-12-28

## 2020-12-28 DIAGNOSIS — F10.20 ALCOHOL USE DISORDER, SEVERE, DEPENDENCE (H): ICD-10-CM

## 2020-12-30 ENCOUNTER — OFFICE VISIT - HEALTHEAST (OUTPATIENT)
Dept: ADDICTION MEDICINE | Facility: HOSPITAL | Age: 70
End: 2020-12-30

## 2020-12-30 ENCOUNTER — AMBULATORY - HEALTHEAST (OUTPATIENT)
Dept: ADDICTION MEDICINE | Facility: HOSPITAL | Age: 70
End: 2020-12-30

## 2020-12-30 DIAGNOSIS — F10.20 ALCOHOL USE DISORDER, SEVERE, DEPENDENCE (H): ICD-10-CM

## 2021-01-04 ENCOUNTER — OFFICE VISIT - HEALTHEAST (OUTPATIENT)
Dept: ADDICTION MEDICINE | Facility: HOSPITAL | Age: 71
End: 2021-01-04

## 2021-01-04 DIAGNOSIS — F10.20 ALCOHOL USE DISORDER, SEVERE, DEPENDENCE (H): ICD-10-CM

## 2021-01-06 ENCOUNTER — OFFICE VISIT - HEALTHEAST (OUTPATIENT)
Dept: ADDICTION MEDICINE | Facility: HOSPITAL | Age: 71
End: 2021-01-06

## 2021-01-06 DIAGNOSIS — F10.20 ALCOHOL USE DISORDER, SEVERE, DEPENDENCE (H): ICD-10-CM

## 2021-01-08 ENCOUNTER — AMBULATORY - HEALTHEAST (OUTPATIENT)
Dept: ADDICTION MEDICINE | Facility: HOSPITAL | Age: 71
End: 2021-01-08

## 2021-01-08 ENCOUNTER — OFFICE VISIT - HEALTHEAST (OUTPATIENT)
Dept: ADDICTION MEDICINE | Facility: HOSPITAL | Age: 71
End: 2021-01-08

## 2021-01-08 DIAGNOSIS — F10.20 ALCOHOL USE DISORDER, SEVERE, DEPENDENCE (H): ICD-10-CM

## 2021-01-11 ENCOUNTER — OFFICE VISIT - HEALTHEAST (OUTPATIENT)
Dept: ADDICTION MEDICINE | Facility: HOSPITAL | Age: 71
End: 2021-01-11

## 2021-01-11 DIAGNOSIS — F10.20 ALCOHOL USE DISORDER, SEVERE, DEPENDENCE (H): ICD-10-CM

## 2021-01-14 ENCOUNTER — AMBULATORY - HEALTHEAST (OUTPATIENT)
Dept: ADDICTION MEDICINE | Facility: HOSPITAL | Age: 71
End: 2021-01-14

## 2021-01-15 ENCOUNTER — OFFICE VISIT - HEALTHEAST (OUTPATIENT)
Dept: ADDICTION MEDICINE | Facility: HOSPITAL | Age: 71
End: 2021-01-15

## 2021-01-15 DIAGNOSIS — F10.20 ALCOHOL USE DISORDER, SEVERE, DEPENDENCE (H): ICD-10-CM

## 2021-01-18 ENCOUNTER — OFFICE VISIT - HEALTHEAST (OUTPATIENT)
Dept: ADDICTION MEDICINE | Facility: HOSPITAL | Age: 71
End: 2021-01-18

## 2021-01-18 DIAGNOSIS — F10.20 ALCOHOL USE DISORDER, SEVERE, DEPENDENCE (H): ICD-10-CM

## 2021-01-20 ENCOUNTER — OFFICE VISIT - HEALTHEAST (OUTPATIENT)
Dept: ADDICTION MEDICINE | Facility: HOSPITAL | Age: 71
End: 2021-01-20

## 2021-01-20 DIAGNOSIS — F10.20 ALCOHOL USE DISORDER, SEVERE, DEPENDENCE (H): ICD-10-CM

## 2021-01-22 ENCOUNTER — AMBULATORY - HEALTHEAST (OUTPATIENT)
Dept: ADDICTION MEDICINE | Facility: HOSPITAL | Age: 71
End: 2021-01-22

## 2021-01-22 ENCOUNTER — OFFICE VISIT - HEALTHEAST (OUTPATIENT)
Dept: ADDICTION MEDICINE | Facility: HOSPITAL | Age: 71
End: 2021-01-22

## 2021-01-22 DIAGNOSIS — F10.20 ALCOHOL USE DISORDER, SEVERE, DEPENDENCE (H): ICD-10-CM

## 2021-01-25 ENCOUNTER — OFFICE VISIT - HEALTHEAST (OUTPATIENT)
Dept: ADDICTION MEDICINE | Facility: HOSPITAL | Age: 71
End: 2021-01-25

## 2021-01-25 DIAGNOSIS — F10.20 ALCOHOL USE DISORDER, SEVERE, DEPENDENCE (H): ICD-10-CM

## 2021-01-27 ENCOUNTER — AMBULATORY - HEALTHEAST (OUTPATIENT)
Dept: ADDICTION MEDICINE | Facility: HOSPITAL | Age: 71
End: 2021-01-27

## 2021-01-29 ENCOUNTER — AMBULATORY - HEALTHEAST (OUTPATIENT)
Dept: ADDICTION MEDICINE | Facility: HOSPITAL | Age: 71
End: 2021-01-29

## 2021-01-29 ENCOUNTER — OFFICE VISIT - HEALTHEAST (OUTPATIENT)
Dept: ADDICTION MEDICINE | Facility: HOSPITAL | Age: 71
End: 2021-01-29

## 2021-01-29 DIAGNOSIS — F10.20 ALCOHOL USE DISORDER, SEVERE, DEPENDENCE (H): ICD-10-CM

## 2021-02-01 ENCOUNTER — OFFICE VISIT - HEALTHEAST (OUTPATIENT)
Dept: ADDICTION MEDICINE | Facility: HOSPITAL | Age: 71
End: 2021-02-01

## 2021-02-01 DIAGNOSIS — F10.20 ALCOHOL USE DISORDER, SEVERE, DEPENDENCE (H): ICD-10-CM

## 2021-02-05 ENCOUNTER — AMBULATORY - HEALTHEAST (OUTPATIENT)
Dept: ADDICTION MEDICINE | Facility: HOSPITAL | Age: 71
End: 2021-02-05

## 2021-02-05 ENCOUNTER — OFFICE VISIT - HEALTHEAST (OUTPATIENT)
Dept: ADDICTION MEDICINE | Facility: HOSPITAL | Age: 71
End: 2021-02-05

## 2021-02-05 DIAGNOSIS — F10.20 ALCOHOL USE DISORDER, SEVERE, DEPENDENCE (H): ICD-10-CM

## 2021-02-08 ENCOUNTER — OFFICE VISIT - HEALTHEAST (OUTPATIENT)
Dept: ADDICTION MEDICINE | Facility: HOSPITAL | Age: 71
End: 2021-02-08

## 2021-02-08 DIAGNOSIS — F10.20 ALCOHOL USE DISORDER, SEVERE, DEPENDENCE (H): ICD-10-CM

## 2021-02-12 ENCOUNTER — AMBULATORY - HEALTHEAST (OUTPATIENT)
Dept: ADDICTION MEDICINE | Facility: HOSPITAL | Age: 71
End: 2021-02-12

## 2021-02-12 ENCOUNTER — OFFICE VISIT - HEALTHEAST (OUTPATIENT)
Dept: ADDICTION MEDICINE | Facility: HOSPITAL | Age: 71
End: 2021-02-12

## 2021-02-12 DIAGNOSIS — F10.20 ALCOHOL USE DISORDER, SEVERE, DEPENDENCE (H): ICD-10-CM

## 2021-02-15 ENCOUNTER — OFFICE VISIT - HEALTHEAST (OUTPATIENT)
Dept: ADDICTION MEDICINE | Facility: HOSPITAL | Age: 71
End: 2021-02-15

## 2021-02-15 DIAGNOSIS — F10.20 ALCOHOL USE DISORDER, SEVERE, DEPENDENCE (H): ICD-10-CM

## 2021-02-18 ENCOUNTER — AMBULATORY - HEALTHEAST (OUTPATIENT)
Dept: ADDICTION MEDICINE | Facility: HOSPITAL | Age: 71
End: 2021-02-18

## 2021-02-19 ENCOUNTER — AMBULATORY - HEALTHEAST (OUTPATIENT)
Dept: ADDICTION MEDICINE | Facility: HOSPITAL | Age: 71
End: 2021-02-19

## 2021-02-19 ENCOUNTER — OFFICE VISIT - HEALTHEAST (OUTPATIENT)
Dept: ADDICTION MEDICINE | Facility: HOSPITAL | Age: 71
End: 2021-02-19

## 2021-02-19 DIAGNOSIS — F10.20 ALCOHOL USE DISORDER, SEVERE, DEPENDENCE (H): ICD-10-CM

## 2021-05-26 ASSESSMENT — PATIENT HEALTH QUESTIONNAIRE - PHQ9
SUM OF ALL RESPONSES TO PHQ QUESTIONS 1-9: 6
SUM OF ALL RESPONSES TO PHQ QUESTIONS 1-9: 7
SUM OF ALL RESPONSES TO PHQ QUESTIONS 1-9: 12
SUM OF ALL RESPONSES TO PHQ QUESTIONS 1-9: 9
SUM OF ALL RESPONSES TO PHQ QUESTIONS 1-9: 12

## 2021-05-27 ASSESSMENT — PATIENT HEALTH QUESTIONNAIRE - PHQ9
SUM OF ALL RESPONSES TO PHQ QUESTIONS 1-9: 12
SUM OF ALL RESPONSES TO PHQ QUESTIONS 1-9: 9
SUM OF ALL RESPONSES TO PHQ QUESTIONS 1-9: 5

## 2021-05-27 NOTE — TELEPHONE ENCOUNTER
Pt. Is due to see Dr. Brunner 4/23/19. Phoned Rishi to ask if he wants the lab orders to be faxed to the [facility] lab.  Could not leave a message as his phone is not taking messages due to being full.

## 2021-05-27 NOTE — TELEPHONE ENCOUNTER
Pt. Returned phone call regarding lab work ordered by Dr. Brunner.  Informed him about the lab work.  Reported he has been honest with Dr. Brunner about his alcohol abuse. Explained Dr. Brunner needs the lab work to check on his health. States he understands but has two concerns: 1. Where to get the blood work drawn.  He lives in [place]. Prefers to get the blood drawn at the [facility] in [place], [phone]. Phone call to the clinic, spoke with lab , yes the lab could draw the blood with an order from Dr. Brunner, fax [phone].  OR  His PMD Dr. Alec Maradiaga [facility], pt. Thought [facility] but Dr. Maradiaga is at the [facility] [place].  2.  Second concern is the cost.  Pt. Has Medicare A,B and D.  Asked the  at the [facility] lab about covered costs and she said it would depend on his Medicare.  Did attempt to find covered lab costs on the Medicare site but was unsuccessful.    Phone call to Rishi [phone]. Phone line was identified as Rishi's.  Left [facility] about the above information. Asked him to call the clinic if he wants the lab orders sent to the [facility] and Dr. Brunner will be advised of his request.

## 2021-05-27 NOTE — PROGRESS NOTES
Correct pharmacy verified with patient and confirmed in snapshot? [x] yes []no    Charge captured ? [x] yes  [] no    Medications Phoned  to Pharmacy [] yes [x]no  Name of Pharmacist:  List Medications, including dose, quantity and instructions      Medication Prescriptions given to patient   [] yes  [x] no   List the name of the drug the prescription was written for.       Medications ordered this visit were e-scribed.  Verified by order class [x] yes  [] no  Suboxone 8-2 mg    Medication changes or discontinuations were communicated to patient's pharmacy: [] yes  [x] no    UA collected [x] yes  [] no    [place] Prescription Monitoring Program Reviewed? [x] yes  [] no    Referrals were made to:none      Future appointment was made: [x] yes  [] no    Dictation completed at time of chart check: [x] yes  [] no    I have checked the documentation for today s encounters and the above information has been reviewed and completed.

## 2021-05-27 NOTE — TELEPHONE ENCOUNTER
Date of Last Office Visit: 3/26/19  Date of Next Office Visit: 4/23/19  No shows since last visit: no  Cancellations since last visit: no  ED visits since last visit: yes    Medication Trazodone 100 mg date last ordered: 2/1/19  Qty: 45  Refills: 1    traZODone (DESYREL) 100 MG tablet 45 tablet 1 2/1/2019  No   Sig: TAKE 1 TO 1 AND 1/2 TABLETS BY MOUTH EVERY NIGHT 1 HOUR BEFORE BEDTIME     Lapse in therapy greater than 7 days: no  Medication refill request verified as identical to current order: yes  Result of Last DAM, VPA, Li+ Level, CBC, or Carbamazepine Level (at or since last visit): Negative DAM on 3/26/19; ETG > 39383        []Eligibility - not seen in last year    []Supervision - no future appointment    []Compliance     []Verification - order discrepancy    []Controlled Medication    []90 - day supply request    [x]Other LPN pending medications    Current Medication list:      aspirin 81 MG EC tablet Take 81 mg by mouth daily.   buprenorphine-naloxone (SUBOXONE SL TABLET) 8-2 mg Subl per sublingual tab Place 1 tablet under the tongue 3 (three) times a day.   cholecalciferol, vitamin D3, (VITAMIN D3) 2,000 unit Tab Take 1 tablet by mouth every evening.   CYANOCOBALAMIN, VITAMIN B-12, (VITAMIN B12 ORAL) Take 1 tablet by mouth 2 (two) times a day.   FERROUS SULFATE, DRIED (IRON, DRIED, ORAL) Take 1 tablet by mouth. About every 3 days.   folic acid (FOLVITE) 1 MG tablet TK 1 T PO QD   furosemide (LASIX) 20 MG tablet Take 20 mg by mouth daily as needed.   gabapentin (NEURONTIN) 300 MG capsule TAKE 1 CAPSULE IN THE MORNING, 1-2 LATER AS NEEDED.   multivitamin therapeutic (THERAGRAN) tablet Take 1 tablet by mouth daily.   OMEGA-3/DHA/EPA/FISH OIL (FISH OIL-OMEGA-3 FATTY ACIDS) 300-1,000 mg capsule Take 2 g by mouth daily.   traZODone (DESYREL) 100 MG tablet TAKE 1 TO 1 AND 1/2 TABLETS BY MOUTH EVERY NIGHT 1 HOUR BEFORE BEDTIME   UBIDECARENONE (COENZYME Q10) 100 mg Tab tablet Take 100 mg by mouth daily.    venlafaxine (EFFEXOR XR) 150 MG 24 hr capsule Take 1 capsule (150 mg total) by mouth daily.   vitamin E 200 UNIT capsule Take 200 Units by mouth daily.       Medication Plan of Care at last office visit with MD/CNP:    PLAN:    1. Patient changed to buprenorphine-naloxone tablets given the reduction in copay. His dose was also increased to 8/2 mg three times a day given his increased pain and cravings. We discussed the risk of combining alcohol and suboxone, and patient denies any current experience of alcohol withdrawal at this time. Patient was started on baclofen 10 mg po three times a day to help with his alcohol cravings as they are associated with severe anxiety.   2. Patient increased effexor  mg po daily, especially given his report of increased depression and difficulty with fatigue and function. 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   3. Patient encouraged to consider individual psychotherapy and attending a weekly recovery meeting for increased support.  4. Patient urine toxicology reveals etg/ets >10,000; DAM negative.   5. Patient advised his blood pressure was elevated in clinic, but reports this has been normal at home measurements and in pcp visits. Certainly ongoing alcohol use could be contributing to this elevated blood pressure.  6. Concerning that patient is putting a lot of emphasis on his upcoming plan to move to [place] as the answer to all of his problems, and this high expectation may put him at grave risk of relapse when it inevitably does not live up to this hype.   7. Patient advised on smoking cessation. Uses e-cigarette at times.   8. Patient to rtc in 1 month for follow-up and sooner prn. May need to schedule for a rule 25 if drinking continues.     [place], [place], and [place] : NA

## 2021-05-28 ASSESSMENT — ANXIETY QUESTIONNAIRES
GAD7 TOTAL SCORE: 5
GAD7 TOTAL SCORE: 9
GAD7 TOTAL SCORE: 11
GAD7 TOTAL SCORE: 12
GAD7 TOTAL SCORE: 8
GAD7 TOTAL SCORE: 6
GAD7 TOTAL SCORE: 4
GAD7 TOTAL SCORE: 6

## 2021-05-28 NOTE — PROGRESS NOTES
Correct pharmacy verified with patient and confirmed in snapshot? [x] yes []no    Charge captured ? [x] yes  [] no    Medications Phoned  to Pharmacy [] yes [x]no  Name of Pharmacist:  List Medications, including dose, quantity and instructions      Medication Prescriptions given to patient   [] yes  [x] no   List the name of the drug the prescription was written for.       Medications ordered this visit were e-scribed.  Verified by order class [x] yes  [] no  Suboxone 8-2 mg  Effexor 150 mg    Medication changes or discontinuations were communicated to patient's pharmacy: [] yes  [x] no    UA collected [x] yes  [] no    [place] Prescription Monitoring Program Reviewed? [x] yes  [] no    Referrals were made to: none     Future appointment was made: [x] yes  [] no    Dictation completed at time of chart check: [] yes  [x] no    I have checked the documentation for today s encounters and the above information has been reviewed and completed.

## 2021-05-28 NOTE — TELEPHONE ENCOUNTER
Received a call from [facility] to fax the order for patient's lab work as ordered by Dr. Brunner. Orders were faxed to [phone].

## 2021-05-28 NOTE — TELEPHONE ENCOUNTER
Date of Last Office Visit: 4-23-19  Date of Next Office Visit: 5-21-19  No shows since last visit: 0  Cancellations since last visit: 0  ED visits since last visit:  0  Medication trazodone 4-12-19  Lapse in therapy greater than 7 days: # 45 with no refills, gabapentin 3-22-19 # 90 with no refills  Medication refill request verified as identical to current order: yes  Result of Last DAM, VPA, Li+ Level, CBC, or Carbamazepine Level (at or since last visit): N/A     [] Medication refilled per [facility] M-1.   [x] Medication unable to be refilled by RN due to criteria not met as indicated below:     []Eligibility - not seen in last year    []Supervision - no future appointment    []Compliance     []Verification - order discrepancy    []Controlled Medication    [x]Medication not included in RN Protocol    []90 - day supply request    []Other   Current Medication list:    aspirin 81 MG EC tablet Take 81 mg by mouth daily.   buprenorphine-naloxone (SUBOXONE SL TABLET) 8-2 mg Subl per sublingual tab Place 1 tablet under the tongue 3 (three) times a day.   cholecalciferol, vitamin D3, (VITAMIN D3) 2,000 unit Tab Take 1 tablet by mouth every evening.   CYANOCOBALAMIN, VITAMIN B-12, (VITAMIN B12 ORAL) Take 1 tablet by mouth 2 (two) times a day.   FERROUS SULFATE, DRIED (IRON, DRIED, ORAL) Take 1 tablet by mouth. About every 3 days.   folic acid (FOLVITE) 1 MG tablet TK 1 T PO QD   furosemide (LASIX) 20 MG tablet Take 20 mg by mouth daily as needed.   gabapentin (NEURONTIN) 300 MG capsule TAKE 1 CAPSULE IN THE MORNING, 1-2 LATER AS NEEDED.   multivitamin therapeutic (THERAGRAN) tablet Take 1 tablet by mouth daily.   OMEGA-3/DHA/EPA/FISH OIL (FISH OIL-OMEGA-3 FATTY ACIDS) 300-1,000 mg capsule Take 2 g by mouth daily.   traZODone (DESYREL) 100 MG tablet TAKE 1 TO 1 AND 1/2 TABLETS BY MOUTH EVERY NIGHT 1 HOUR BEFORE BEDTIME   UBIDECARENONE (COENZYME Q10) 100 mg Tab tablet Take 100 mg by mouth daily.   venlafaxine (EFFEXOR XR) 150 MG  24 hr capsule Take 1 capsule (150 mg total) by mouth daily.   vitamin E 200 UNIT capsule Take 200 Units by mouth daily.       Medication Plan of Care at last office visit with MD/CNP:  1. Patient to continue on buprenorphine-naloxone 8/2 mg SL tablet three times a day, and a 30-day supply was given. We again reviewed the danger of using beverage alcohol in combination with suboxone.

## 2021-05-29 NOTE — TELEPHONE ENCOUNTER
Patient called and Dr. Brunner's directives were communicated to patient.  He said he has a rule 25 scheduled next Wednesday and would like to seek treatment at [facility]

## 2021-05-29 NOTE — TELEPHONE ENCOUNTER
Rishi, [phone], states he completed his Rule 25 in [place] and it is being faxed here today.  He would like to know what the next steps are.

## 2021-05-29 NOTE — TELEPHONE ENCOUNTER
Writer spoke with Krystina, [facility] intake, patient is scheduled for inpatient admission this Friday, 6/21/19. He was given inpt contact name/number.

## 2021-05-29 NOTE — TELEPHONE ENCOUNTER
Called patient, but his mailbox is full and couldn't leave a message.     Per Dr. Cesario Keys - Room 418, if stable admit to [facility] unit, detox from opiates.

## 2021-05-29 NOTE — PROGRESS NOTES
Correct pharmacy verified with patient and confirmed in snapshot? [x] yes []no    Charge captured ? [x] yes  [] no    Medications Phoned  to Pharmacy [] yes [x]no  Name of Pharmacist:  List Medications, including dose, quantity and instructions      Medication Prescriptions given to patient   [] yes  [x] no   List the name of the drug the prescription was written for.       Medications ordered this visit were e-scribed.  Verified by order class [x] yes  [] no  Suboxone 8-2 mg    Medication changes or discontinuations were communicated to patient's pharmacy: [] yes  [x] no    UA collected [x] yes  [] no    [place] Prescription Monitoring Program Reviewed? [x] yes  [] no    Referrals were made to:Rule 25      Future appointment was made: [] yes  [x] no    Dictation completed at time of chart check: [] yes  [x] no    I have checked the documentation for today s encounters and the above information has been reviewed and completed.

## 2021-05-30 VITALS — BODY MASS INDEX: 26.51 KG/M2 | HEIGHT: 69 IN | WEIGHT: 179 LBS

## 2021-05-30 VITALS — BODY MASS INDEX: 27.55 KG/M2 | WEIGHT: 186 LBS | HEIGHT: 69 IN

## 2021-05-30 VITALS — WEIGHT: 182.08 LBS | BODY MASS INDEX: 26.97 KG/M2 | HEIGHT: 69 IN

## 2021-05-30 VITALS — BODY MASS INDEX: 28.44 KG/M2 | HEIGHT: 69 IN | WEIGHT: 192 LBS

## 2021-05-31 VITALS — BODY MASS INDEX: 26.51 KG/M2 | WEIGHT: 179 LBS | HEIGHT: 69 IN

## 2021-05-31 VITALS — WEIGHT: 181 LBS | BODY MASS INDEX: 26.81 KG/M2 | HEIGHT: 69 IN

## 2021-05-31 VITALS — WEIGHT: 186 LBS | HEIGHT: 69 IN | BODY MASS INDEX: 27.55 KG/M2

## 2021-05-31 VITALS — HEIGHT: 69 IN | WEIGHT: 179 LBS | BODY MASS INDEX: 26.51 KG/M2

## 2021-05-31 VITALS — WEIGHT: 179.12 LBS | HEIGHT: 69 IN | BODY MASS INDEX: 26.53 KG/M2

## 2021-05-31 VITALS — BODY MASS INDEX: 26.66 KG/M2 | HEIGHT: 69 IN | WEIGHT: 180 LBS

## 2021-05-31 VITALS — WEIGHT: 180 LBS | BODY MASS INDEX: 26.66 KG/M2 | HEIGHT: 69 IN

## 2021-05-31 NOTE — PROGRESS NOTES
Correct pharmacy verified with patient and confirmed in snapshot? [x] yes []no    Charge captured ? [x] yes  [] no    Medications Phoned  to Pharmacy [] yes [x]no  Name of Pharmacist:  List Medications, including dose, quantity and instructions      Medication Prescriptions given to patient   [] yes  [x] no   List the name of the drug the prescription was written for.       Medications ordered this visit were e-scribed.  Verified by order class [x] yes  [] no  Suboxone  Medication changes or discontinuations were communicated to patient's pharmacy: [] yes  [x] no    UA collected [x] yes  [] no    [place] Prescription Monitoring Program Reviewed? [x] yes  [] no    Referrals were made to:  none    Future appointment was made: [x] yes  [] no  8/23/19  Dictation completed at time of chart check: [] yes  [x] no    I have checked the documentation for today s encounters and the above information has been reviewed and completed.

## 2021-05-31 NOTE — TELEPHONE ENCOUNTER
Date of Last Office Visit: 7/26/19  Date of Next Office Visit: 8/30/19  No shows since last visit: none  Cancellations since last visit: 8/23/19  ED visits since last visit:  none  Medication trazodone 100mg date last ordered: 6/11/19  Qty: 45  Refills: 0  Lapse in therapy greater than 7 days: yes  Medication refill request verified as identical to current order: yes  Result of Last DAM, VPA, Li+ Level, CBC, or Carbamazepine Level (at or since last visit): N/A     [] Medication refilled per [facility] M-1.   [x] Medication unable to be refilled by RN due to criteria not met as indicated below:     []Eligibility - not seen in last year    []Supervision - no future appointment    [x]Compliance     []Verification - order discrepancy    []Controlled Medication    []Medication not included in RN Protocol    []90 - day supply request    []Other   Current Medication list:      Medic  acamprosate (CAMPRAL) 333 mg tablet TAKE 2 TABLETS(666 MG) BY MOUTH THREE TIMES DAILY   aspirin 81 MG EC tablet Take 81 mg by mouth daily.   buprenorphine-naloxone (SUBOXONE SL TABLET) 8-2 mg Subl per sublingual tab Place 1 tablet under the tongue 3 (three) times a day.   calcium, as carbonate, (TUMS) 200 mg calcium (500 mg) chewable tablet Chew 1 tablet daily as needed for heartburn.   furosemide (LASIX) 20 MG tablet Take 20 mg by mouth daily as needed.   gabapentin (NEURONTIN) 300 MG capsule TAKE 1 CAPSULE BY MOUTH EVERY MORNING AND 1-2 CAPSULES BY MOUTH LATER IN THE DAY AS NEEDED.   traZODone (DESYREL) 100 MG tablet TAKE 1 TO 1 AND 1/2 TABLETS BY MOUTH EVERY NIGHT 1 HOUR BEFORE BEDTIME   venlafaxine (EFFEXOR XR) 150 MG 24 hr capsule Take 1 capsule (150 mg total) by mouth daily.     ation Plan of Care at last office visit with MD/CNP:    Diagnoses/Plan:  1. Patient to continue on buprenorphine-naloxone 8/2 mg SL tablet three times a day, and a 30-day supply was given.   3. Continue effexor daily, 150 mg po daily. Monitor mood closely  4.  Patient strongly encouraged to add attending 12 step groups to his program.  5. Patient urine toxicology reveals etg/ets <100/100; DAM negative  6. Patient advised on smoking cessation. Precontemplative.   7. Patient to rtc in 1 month for follow-up and sooner prn.

## 2021-05-31 NOTE — PROGRESS NOTES
Correct pharmacy verified with patient and confirmed in snapshot? [x] yes []no    Charge captured ? [x] yes  [] no    Medications Phoned  to Pharmacy [] yes [x]no  Name of Pharmacist:  List Medications, including dose, quantity and instructions      Medication Prescriptions given to patient   [] yes  [x] no   List the name of the drug the prescription was written for.       Medications ordered this visit were e-scribed.  Verified by order class [x] yes  [] no  Suboxone 8-2 mg  Wellburtin 150 mg    Medication changes or discontinuations were communicated to patient's pharmacy: [] yes  [x] no    UA collected [x] yes  [] no    [place] Prescription Monitoring Program Reviewed? [x] yes  [] no    Referrals were made to: none     Future appointment was made: [x] yes  [] no    Dictation completed at time of chart check: [] yes  [x] no    I have checked the documentation for today s encounters and the above information has been reviewed and completed.

## 2021-06-01 VITALS — HEIGHT: 69 IN | BODY MASS INDEX: 26.36 KG/M2 | WEIGHT: 178 LBS

## 2021-06-01 VITALS — HEIGHT: 69 IN | WEIGHT: 176 LBS | BODY MASS INDEX: 26.07 KG/M2

## 2021-06-01 VITALS — WEIGHT: 179.12 LBS | HEIGHT: 69 IN | BODY MASS INDEX: 26.53 KG/M2

## 2021-06-01 VITALS — HEIGHT: 69 IN | WEIGHT: 175 LBS | BODY MASS INDEX: 25.92 KG/M2

## 2021-06-01 VITALS — HEIGHT: 69 IN | WEIGHT: 187 LBS | BODY MASS INDEX: 27.7 KG/M2

## 2021-06-01 NOTE — TELEPHONE ENCOUNTER
Writer talked to patient he had incorrect time written down for today's appt.    Writer did confirm with provider if she will see him after arriving late.

## 2021-06-01 NOTE — TELEPHONE ENCOUNTER
Spoke to Rishi regarding his lab result. Pt aware that it was going to be high/positive. Expressed Dr. Brunner's concern of combining alcohol and suboxone use. Pt stated understanding. Also informed him that it's recommended he have repeat testing done to check the status of his thrombocytopenia, cirrhosis and hepatitis. Pt states he will call his PCP today, but would like Dr. Brunner to specific what labs he's to have done so he can check on the financial cost of them. He's worried he may not be able to afford them.

## 2021-06-01 NOTE — TELEPHONE ENCOUNTER
Per provider's directive dialed the pt and asked to give us a call to updated on how is he doing.  Reviewed , shows Suboxone last filled on 9/26/19 Qty: 90 for 30 days    ( 09/26/2019 1 09/13/2019 Buprenorphin-Naloxon 8-2 Mg Sl   90.00 30 Em Bru )  Last seen: 9/13/19  Next f/u: 10/4/19  9/27/19 appt cancelled: Provider Initiated (Clinician unavail. 1:15 held for client )

## 2021-06-01 NOTE — PROGRESS NOTES
Correct pharmacy verified with patient and confirmed in snapshot? [x] yes []no    Charge captured ? [x] yes  [] no    Medications Phoned  to Pharmacy [x] yes []no  Name of Pharmacist:  List Medications, including dose, quantity and instructions      Medication Prescriptions given to patient   [x] yes  [] no   List the name of the drug the prescription was written for.       Medications ordered this visit were e-scribed.  Verified by order class [x] yes  [] no  Gabapentin 300 mg; Suboxone 8-2 mg; Trazodone 100 mg; Chantix start month pack  Medication changes or discontinuations were communicated to patient's pharmacy: [] yes  [x] no    UA collected [x] yes  [] no    [place] Prescription Monitoring Program Reviewed? [x] yes  [] no    Referrals were made to:  None    Future appointment was made: [x] yes  [] no  9/27/2019  Dictation completed at time of chart check: [] yes  [x] no    I have checked the documentation for today s encounters and the above information has been reviewed and completed.

## 2021-06-01 NOTE — TELEPHONE ENCOUNTER
Call placed to Rishi, and he said he cannot get the lab work before the next appointment with Dr. Brunner, because he cannot afford it.  It will be $150.  He said he was behind on bills after being in treatment for 5 weeks, and is getting caught up on those things and cannot afford the lab work at this time, but as soon as he can, he will have it done.

## 2021-06-01 NOTE — TELEPHONE ENCOUNTER
Pt called stating he is unable to get requested labs completed by his next appointment with Lucia on 09/13/19 at 11:15am.

## 2021-06-02 ENCOUNTER — RECORDS - HEALTHEAST (OUTPATIENT)
Dept: ADMINISTRATIVE | Facility: CLINIC | Age: 71
End: 2021-06-02

## 2021-06-02 VITALS — WEIGHT: 190 LBS | HEIGHT: 69 IN | BODY MASS INDEX: 28.14 KG/M2

## 2021-06-02 VITALS — BODY MASS INDEX: 27.85 KG/M2 | WEIGHT: 188 LBS | HEIGHT: 69 IN

## 2021-06-02 VITALS — HEIGHT: 69 IN | BODY MASS INDEX: 28.29 KG/M2 | WEIGHT: 191 LBS

## 2021-06-02 VITALS — HEIGHT: 69 IN | BODY MASS INDEX: 27.99 KG/M2 | WEIGHT: 189 LBS

## 2021-06-02 VITALS — BODY MASS INDEX: 29.18 KG/M2 | HEIGHT: 69 IN | WEIGHT: 197 LBS

## 2021-06-02 VITALS — BODY MASS INDEX: 27.99 KG/M2 | WEIGHT: 189 LBS | HEIGHT: 69 IN

## 2021-06-02 NOTE — PROGRESS NOTES
Correct pharmacy verified with patient and confirmed in snapshot? [x] yes []no    Charge captured ? [x] yes  [] no    Medications Phoned  to Pharmacy [] yes [x]no  Name of Pharmacist:  List Medications, including dose, quantity and instructions      Medication Prescriptions given to patient   [] yes  [x] no   List the name of the drug the prescription was written for.       Medications ordered this visit were e-scribed.  Verified by order class [x] yes  [] no  buprenorphine-naloxone (SUBOXONE SL TABLET) 8-2 mg Subl per sublingual tab 90 tablet         Medication changes or discontinuations were communicated to patient's pharmacy: [] yes  [x] no    UA collected [x] yes  [] no    [place] Prescription Monitoring Program Reviewed? [x] yes  [] no    Referrals were made to:   no    Future appointment was made: [x] yes  [] no    Dictation completed at time of chart check: [] yes  [x] no    I have checked the documentation for today s encounters and the above information has been reviewed and completed.

## 2021-06-02 NOTE — TELEPHONE ENCOUNTER
Refill request from [facility] for Buprenorphine-Naloxone 8-2 mg, but in calling them they said it was sent in error, because they have a script on file for filling, so it was refused.

## 2021-06-02 NOTE — TELEPHONE ENCOUNTER
Call received from Rishi stating he is trying to get the refill on his Bupropion  ng.  Call placed to [facility] and they stated the last script they had was from 08/30/2019, stating they never received the script from 09/02/2019, so the script was read to the pharmacist as:    Wellbutrin  mg TAKE 1 TABLET BY MOUTH EVERY MORNING FOR 1 WEEK, THEN 2 TABLETS BY MOUTH EVERY MORNING THEREAFTER, #163, and 0 refills.    Directions were repeated back for accuracy and to attempt to prevent errors.

## 2021-06-03 VITALS — HEIGHT: 69 IN | BODY MASS INDEX: 26.36 KG/M2 | WEIGHT: 178 LBS

## 2021-06-03 VITALS — HEIGHT: 69 IN | BODY MASS INDEX: 27.11 KG/M2 | WEIGHT: 183 LBS

## 2021-06-03 VITALS
WEIGHT: 178 LBS | BODY MASS INDEX: 26.36 KG/M2 | SYSTOLIC BLOOD PRESSURE: 149 MMHG | HEIGHT: 69 IN | HEART RATE: 98 BPM | DIASTOLIC BLOOD PRESSURE: 101 MMHG

## 2021-06-03 VITALS — WEIGHT: 180 LBS | BODY MASS INDEX: 26.66 KG/M2 | HEIGHT: 69 IN

## 2021-06-03 VITALS — WEIGHT: 174 LBS | BODY MASS INDEX: 25.77 KG/M2 | HEIGHT: 69 IN

## 2021-06-03 VITALS
DIASTOLIC BLOOD PRESSURE: 78 MMHG | BODY MASS INDEX: 25.92 KG/M2 | SYSTOLIC BLOOD PRESSURE: 125 MMHG | WEIGHT: 175 LBS | HEIGHT: 69 IN | HEART RATE: 90 BPM

## 2021-06-03 NOTE — TELEPHONE ENCOUNTER
Pt called and would like a call back from RN staff.  He would like to be able to communicate by email if possible.  He reports he is currently detoxing after a relapse on alcohol and will not be able to make it to appt on 11/22 as he is not able to drive safely.  Pt can be reached at [phone]

## 2021-06-03 NOTE — TELEPHONE ENCOUNTER
"Pt returned call and states that he's \"making progress\". Pt took one shot of vodka this morning and didn't want to drive under the influence. He's proud of quitting smoking after 40 years and states it has been challenging. He's \"titrating himself down\" from the alcohol and wants Dr. Brunner to know he is alright. Pt rescheduled for 11/22/19.   "

## 2021-06-03 NOTE — TELEPHONE ENCOUNTER
Pt called reports he relapsed on alcohol. He has quit smoking 100% and has been at least two weeks.  Pt feels he can't quit both at the same time.  He reports he is not in a position to drive to appt today.

## 2021-06-03 NOTE — TELEPHONE ENCOUNTER
Pt reports that he's still taking suboxone, but not as prescribed. He has cut down, taking one or two a day. Pt requested a refill request today (waiting for it to come in still). Pt reports drinking two shots of vodka this morning, drinking daily. Pt initially denied having a Rule 25, but then agreed to, but he states he can't afford treatment at this time. Pt transferred to central scheduling to schedule his Rule 25.

## 2021-06-03 NOTE — TELEPHONE ENCOUNTER
"Pt called back. States that he's \"doing better\". He admits to drinking, thus not being able to drive to appt today. Pt rescheduled appt with Dr. Brunner for 12/6/19.   "

## 2021-06-04 VITALS
HEART RATE: 99 BPM | SYSTOLIC BLOOD PRESSURE: 155 MMHG | BODY MASS INDEX: 25.62 KG/M2 | HEIGHT: 69 IN | WEIGHT: 173 LBS | DIASTOLIC BLOOD PRESSURE: 99 MMHG

## 2021-06-04 VITALS
SYSTOLIC BLOOD PRESSURE: 144 MMHG | BODY MASS INDEX: 25.77 KG/M2 | HEART RATE: 98 BPM | DIASTOLIC BLOOD PRESSURE: 93 MMHG | WEIGHT: 174 LBS | HEIGHT: 69 IN

## 2021-06-04 NOTE — PROGRESS NOTES
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 the criteria for alcohol/drug use disorder have been met at any time during a period of 12 months or longer (with the exception that Criterion A4,  Craving or strong desire or urge to use alcohol/drug  may be met).   Specify if:   This additional specifier is used if the individual is in an environment where access to alcohol is restricted.    Mild: Presence of 2-3 symptoms  Moderate: Presence of 4-5 symptoms  Severe: Presence of 6 or more symptoms    This document is being reviewed and co-signed by a medical doctor. A follow up appointment will be scheduled if necessary to determine medical necessity for treatment services.     Staff Name and Title: Stephanie Mcfarlane   Date:  12/6/2019  Time:  10:54 AM

## 2021-06-04 NOTE — TELEPHONE ENCOUNTER
Date of Last Office Visit: 12/6/19  Date of Next Office Visit: 1/17/20  No shows since last visit: no  Cancellations since last visit: 1/3/20 - Pt initiated  ED visits since last visit: no    Medication Trazodone 100 mg date last ordered: 11/27/19  Qty: 45  Refills: 0    traZODone (DESYREL) 100 MG tablet 45 tablet 0 11/27/2019  No   Sig: TAKE 1 TO 1 1/2 TABLETS BY MOUTH EVERY NIGHT 1 HOUR BEFORE BEDTIME       Lapse in therapy greater than 7 days: no  Medication refill request verified as identical to current order: yes  Result of Last DAM, VPA, Li+ Level, CBC, or Carbamazepine Level (at or since last visit): Negative DAM, pos BUP and ETG > 52665/55296 on 12/6/19        []Eligibility - not seen in last year    []Supervision - no future appointment    [x]Compliance: Pos ETG; r/s 1/3/20     []Verification - order discrepancy    []Controlled Medication    []90 - day supply request    [x]Other LPN pending medications    Current Medication list:       acamprosate (CAMPRAL) 333 mg tablet TAKE 2 TABLETS(666 MG) BY MOUTH THREE TIMES DAILY   aspirin 81 MG EC tablet Take 81 mg by mouth daily.   buprenorphine-naloxone (SUBOXONE SL TABLET) 8-2 mg Subl per sublingual tab Place 1 tablet under the tongue 3 (three) times a day.   buPROPion (WELLBUTRIN SR) 150 MG 12 hr tablet Take 2 tablets (300 mg total) by mouth every morning. Do Not Crush   calcium, as carbonate, (TUMS) 200 mg calcium (500 mg) chewable tablet Chew 1 tablet daily as needed for heartburn.   furosemide (LASIX) 20 MG tablet Take 20 mg by mouth daily as needed.   gabapentin (NEURONTIN) 300 MG capsule TAKE 1 CAPSULE BY MOUTH EVERY MORNING AND 1-2 CAPSULES BY MOUTH LATER IN THE DAY AS NEEDED.   traZODone (DESYREL) 100 MG tablet TAKE 1 TO 1 1/2 TABLETS BY MOUTH EVERY NIGHT 1 HOUR BEFORE BEDTIME   venlafaxine (EFFEXOR XR) 150 MG 24 hr capsule Take 1 capsule (150 mg total) by mouth daily.         Medication Plan of Care at last office visit with MD/CNP:    PLAN:  Dictation  not completed    [place], [place], [place], and [place] : NA

## 2021-06-05 NOTE — PROGRESS NOTES
Correct pharmacy verified with patient and confirmed in snapshot? [x] yes []no    Charge captured ? [x] yes  [] no    Medications Phoned  to Pharmacy [] yes [x]no  Name of Pharmacist:  List Medications, including dose, quantity and instructions      Medication Prescriptions given to patient   [] yes  [x] no   List the name of the drug the prescription was written for.       Medications ordered this visit were e-scribed.  Verified by order class [x] yes  [] no  Suboxone 8-2 mg  Gabapentin 600 mg    Medication changes or discontinuations were communicated to patient's pharmacy: [x] yes  [] no  Gabapentin 300 mg    UA collected [x] yes  [] no    [place] Prescription Monitoring Program Reviewed? [x] yes  [] no    Referrals were made to:none      Future appointment was made: [x] yes  [] no    Dictation completed at time of chart check: [] yes  [x] no    I have checked the documentation for today s encounters and the above information has been reviewed and completed.

## 2021-06-07 NOTE — TELEPHONE ENCOUNTER
Date of Last Office Visit: 3/27/2020  Date of Next Office Visit: none (central scheduling will contact patient)  No shows since last visit: none  Cancellations since last visit: none  ED visits since last visit:  none    Medication Wellbutrin  mg  date last ordered: 12/30/2019  Qty: 180  Refills: 0    Lapse in therapy greater than 7 days: none  Medication refill request verified as identical to current order: yes  Result of Last DAM, VPA, Li+ Level, CBC, or Carbamazepine Level (at or since last visit): N/A     [] Medication refilled per [facility] M-1.   [x] Medication unable to be refilled by RN due to criteria not met as indicated below:     []Eligibility - not seen in last year    []Supervision - no future appointment    []Compliance     []Verification - order discrepancy    []Controlled Medication    []Medication not included in RN Protocol    [x]90 - day supply request    []Other     Current Medication list:  Rishi Littleedson    (MRN [id number])   Your Current Medications Are     aspirin 81 MG EC tablet  Take 81 mg by mouth daily.    buprenorphine-naloxone (SUBOXONE SL TABLET) 8-2 mg Subl per sublingual tab  1 tablet three times a day    buPROPion (WELLBUTRIN SR) 150 MG 12 hr tablet  Take 2 tablets (300 mg total) by mouth every morning. Do Not Crush    calcium, as carbonate, (TUMS) 200 mg calcium (500 mg) chewable tablet  Chew 1 tablet daily as needed for heartburn.    furosemide (LASIX) 20 MG tablet  Take 20 mg by mouth daily as needed.    gabapentin (NEURONTIN) 600 MG tablet  Take 1 tablet (600 mg total) by mouth 3 (three) times a day.    traZODone (DESYREL) 100 MG tablet  TAKE 1 TO 1 AND 1/2 TABLETS BY MOUTH EVERY NIGHT 1 HOUR BEFORE BEDTIME    venlafaxine (EFFEXOR XR) 150 MG 24 hr capsule  Take 1 capsule (150 mg total) by mouth daily.        Medication Plan of Care at last office visit with MD/CNP:  No notes from 3/27/2020 visit.   Diagnoses/Plan:  1. Patient to continue on buprenorphine-naloxone 8/2 mg  SL tablet three times a day, and a 30-day supply was given. Strongly applauded the patient for ceasing use of beverage alcohol and I encouraged him to consider attending 12 step meetings.   2. Continue to take effexor on a daily basis, 150 mg po daily. Monitor mood closely. Strongly recommended patient attend 12 step meetings at this time.   3. Patient encouraged to consider attending outpatient CD treatment.   4. Patient urine toxicology reveals a DAM positive for THC; etg/ets is still pending.  5. Again applauded patient for ceasing the use of beverage alcohol.  6. Patient to rtc in 1 month for follow-up and sooner prn.

## 2021-06-07 NOTE — TELEPHONE ENCOUNTER
Date of Last Office Visit: 3/27/2020  Date of Next Office Visit: none (cental scheduling will contact patient).   No shows since last visit: none  Cancellations since last visit: none  ED visits since last visit:  none    Medication gabapentin 600 mg  date last ordered: 1/31/2020  Qty: 90  Refills: 0    Lapse in therapy greater than 7 days: yes  Medication refill request verified as identical to current order: yes  Result of Last DAM, VPA, Li+ Level, CBC, or Carbamazepine Level (at or since last visit): 1/31/2020-DAM positive THC, ETG neg.     [] Medication refilled per [facility] M-1.   [x] Medication unable to be refilled by RN due to criteria not met as indicated below:     []Eligibility - not seen in last year    []Supervision - no future appointment    [x]Compliance     []Verification - order discrepancy    []Controlled Medication    []Medication not included in RN Protocol    []90 - day supply request    []Other     Current Medication list:  Rishi Littleedson    (MRN [id number])   Your Current Medications Are     aspirin 81 MG EC tablet  Take 81 mg by mouth daily.    buprenorphine-naloxone (SUBOXONE SL TABLET) 8-2 mg Subl per sublingual tab  1 tablet three times a day    buPROPion (WELLBUTRIN SR) 150 MG 12 hr tablet  Take 2 tablets (300 mg total) by mouth every morning. Do Not Crush    calcium, as carbonate, (TUMS) 200 mg calcium (500 mg) chewable tablet  Chew 1 tablet daily as needed for heartburn.    furosemide (LASIX) 20 MG tablet  Take 20 mg by mouth daily as needed.    gabapentin (NEURONTIN) 600 MG tablet  Take 1 tablet (600 mg total) by mouth 3 (three) times a day.    traZODone (DESYREL) 100 MG tablet  TAKE 1 TO 1 AND 1/2 TABLETS BY MOUTH EVERY NIGHT 1 HOUR BEFORE BEDTIME    venlafaxine (EFFEXOR XR) 150 MG 24 hr capsule  Take 1 capsule (150 mg total) by mouth daily.        Medication Plan of Care at last office visit with MD/CNP:   Notes from visit before the last visit.  Diagnoses/Plan:  1. Patient to  continue on buprenorphine-naloxone 8/2 mg SL tablet three times a day, and a 30-day supply was given. Strongly applauded the patient for ceasing use of beverage alcohol and I encouraged him to consider attending 12 step meetings.   2. Continue to take effexor on a daily basis, 150 mg po daily. Monitor mood closely. Strongly recommended patient attend 12 step meetings at this time.   3. Patient encouraged to consider attending outpatient CD treatment.   4. Patient urine toxicology reveals a DAM positive for THC; etg/ets is still pending.  5. Again applauded patient for ceasing the use of beverage alcohol.  6. Patient to rtc in 1 month for follow-up and sooner prn.     Writer does not have access to .

## 2021-06-08 NOTE — TELEPHONE ENCOUNTER
Call received from pharmacist Marlene regarding this refill.Marlene was told that this patient needs to have a follow up visit scheduled in order to receive a refill. Marlene said that she would call and inform the patient.     Patient made appt.for 6/16/20 with Dr. Brunner. Will re-route refill request.

## 2021-06-08 NOTE — PROGRESS NOTES
"Addiction  Nurse Only Visit Note    2/16/2017  Date of last visit: 1/11/17    Reason for today's visit:   Chief Complaint   Patient presents with     Follow-up       Vitals:    02/16/17 1313   BP: (!) 181/90   Patient Site: Right Arm   Patient Position: Sitting   Cuff Size: Adult Large   Pulse: (!) 105   Temp: 98.3  F (36.8  C)   TempSrc: Oral   Weight: 186 lb (84.4 kg)   Height: 5' 9\" (1.753 m)        If having pain, who will address pain:  PMD    Is pt assisted: No    Urine for toxicology sent today: yes    Last UA date: 12/1/16  Reviewed with patient: yes  Results: DAM neg    AIMS:     Pill count: NA  (Controlled substance only)  Medications needing renewal: see note    Substance use history:    Using alcohol:No  Using street drugs or unprescribed medications: No  Using opioids other that Suboxone:No  Using tobacco:Yes: Describe: 1/2 pack/day    Recovery environment:  Attending formal chemical dependency programming:Relapse Recovery with Nik  Attending \"outside\" mutual help meetings: Yes: Describe: weekly mtg  Has a chemical dependency sponsor: No  Has other elements of structure: Yes: Describe: working part time, guitar, does open mazin  Current Living Situation: rent    Cravings: no    Sleep: no    Depression: no    Anxiety: yes very little, I let it go    Panic Attacks: no    Paranoia: no    Hallucinations: no    Suicidal Ideation: no    Homicidal Ideation:no  Comments: none    Physical Problems: yes does swimming to help shoulder    [place]  was reviewed prior to seeing patient today. See below for embedded report.    Note: No issues or concerns regarding Suboxone, insurance has changed and has some stress due to that. Goes to weekly meetings and attends Relapse Prevention with Nik. Has started working part time. Working on trying to eat better, smoke less and get exercise. BP was high, rechecked at 157/93, does have appt with PMD.    Suboxone 8/2 mg sl film, takes bid,  #52 0-RF, called to pharmacy, spoke " with pharmacist Trey, order read back for accuracy. Next apt 3/14/17.    Patient Instructions   Continue Medications as ordered.  No alcohol or unprescribed drug use.  No driving, if sedated.  Come to the Emergency Room if not feeling safe.  Call the clinic with any questions [phone].  Follow up as directed, for your appointments, per your After Visit Summary Form.      Call the clinic if any concerns: [facility] [phone]  [facility] [phone]  and Report to the emergency room if you feel like harming yourself or others or are psychotic        Kelly RUBI Peralta    2017 1:16 PM    VENANCIOGEOVANIDAVION VARGHESE  Search Criteria: Last Name 'amanuel' and First Name 'yudith' and  = ' and Request Period = '  to '02/15/17' - 2 out of 2 Recipients Selected.  Fill Date Product, Str, Form Qty Days Pt ID Prescriber Written RX# N/R* Pharm MED+  ---------- -------------------------------- ------ ---- --------- ---------- ---------- ------------ ----- --------- ------  2017 SUBOXONE 8 MG-2 MG SL FILM 60.00 30 [id number] 2017 [id number] N [id number] 480.0  2017 GABAPENTIN 100 MG CAPSULE 120.00 30 [id number] 2017 [id number] N [id number] 00.0  2017 SUBOXONE 8 MG-2 MG SL FILM 22.00 11 [id number] 2017 [id number] N [id number] 480.0  2016 SUBOXONE 8 MG-2 MG SL FILM 30.00 30 [id number] N [id number] 240.0  2016 SUBOXONE 8 MG-2 MG SL FILM 30.00 15 [id number] N [id number] 480.0  2016 GABAPENTIN 100 MG CAPSULE 120.00 30 [id number] N [id number] 00.0  *N/R N=New R=Refill  +MED Daily  Prescribers for prescriptions listed  ----------------------------------------------------------------------------------------------------------------------------------  [id number] YUDITH GUEVARA MD; [address], [place] [address]  [id number] WILLIAM LITTLE MD; HEALING  [facility], [address],[facility] [address]  Pharmacies that dispensed prescriptions listed  ----------------------------------------------------------------------------------------------------------------------------------  [id number] [facility] : [facility] [id number], [address] [facility] [address],  [id number] [facility].; : [facility] [id number], [address] [facility] [address],  Patients that match search criteria  ----------------------------------------------------------------------------------------------------------------------------------  [id number] PAULETTE COLLAZO [address]; [address] [place] [address]  [id number] PAULETTE COLLAZO 50; [address], [place] [address]

## 2021-06-08 NOTE — PROGRESS NOTES
Weekly Progress Note  Rishi Miramontes  1950  [id number]      D) Pt attended 1 groups  this week with 0 absences. A) Staff facilitated groups and reviewed tx progress. Assessed for [facility]. R) No VAP needed at this time. Pt working on the following dimensions:    Dimension #1 - Withdrawal Potential - Risk 0, no concern. Pt reports last use as 9/20/2016. Pt is on suboxone and is making his appointments.   Dimension #2 - Biomedical - Risk 1, mild concern. Pt admits that he has had chronic pain that he had been ignoring due to his drinking. Pt reports that he has reengaged physical therapy and has been going to the [facility] to use the therapy pool. Pt brings a soft neck brace into group and uses it when his pain gets intense.  Medical issues are not a barrier to treatment participation  Dimension #3 - Emotional/Behavioral/Cognitive - Risk 1, mild concern. Pt reports diagnosed depression with anxiety. Pt reports he is no longer going to 1x1 therapy.  Pt reported he attended an LGBT group, again, and appears to be questioning his sexuality.   Dimension #4 - Treatment Acceptance/Resistance - Risk 0, no concern. Pt reports a strong desire to live a sober lifestyle and meet treatment expectations. Pt has good group attendance.  Dimension #5 - Relapse Potential - Risk 1, mild concern. Pt remains vulnerable to relapse due to coping skills are still developing and long history of return to use. However, Pt appears to have made measurable progress with developing sober living skills.   Dimension #6 - Recovery Environment - Risk 2, moderate concern. Pt reports renting space after losing his home and  spouse. Pt reports environment can be stressful due to difficult landlord. Pt does not work, but engages in his hobby of music.  Pt is engaged in the sober community and appears to really enjoy fellowship time with peers in group.  Pt reports relationships with his family are strained. Pt reports no legal concerns.  "Pt reports he has been attending CMA, AA, and an LGBT group.     T) Client educated on Sober Living. Client has completed 89 hours of IOP. Pt has completed 23 hours of RP.  Projected discharge date is 2/10/17. Current discharge plan is TBD.     Donald Welander, LADC        Psycho-Educational Curriculum  Date Attended  Psycho-Educational Curriculum  Date Attended    Acceptance   Shame/Guilt     1st Step   Anger/Rage     Affirmations   Mental Health     Automatic Negative Thoughts   Anxiety     Cross Addiction   Co-Occurring Disorders     Stages of Change   Johana/Bipolar     Relapse   Trauma      Addictive Thoughts   Victim Identity     Coping Skills   Sober Structure     Relapse Prevention   Continuum of Care     Medical Aspects   Non-12 Step Support     Brain/Neurotransmitters   Priorities     Medication Compliance   Spirituality     BEBETO Alcohol/Drug Research   Weekend Planner     Physical Health   Educational Videos     Post Acute Withdrawal   1st Step     Pregnancy and Drug Use   2nd Step     Sexual Health   Assertive Communication     Short-Term/Long-Term Effects   My name is Nik LAM    Relationships   Cross Addiction     Assertive Communication   God As We Understood Him     Boundaries   HBO Relapse     Codependence    HBO What Is Addiction     Defense Mechanisms    Medical Aspects 1     Family Roles   Medical Aspects 2     Goodbye Letter   [employer]: Stress     Intimacy   PBS Depression Out of the Shadows     Needs/Dealbreakers in Relationships   The Anonymous People    Socialization Skills   [place]     Feelings   [place] \"Highjacked Brain\"    ABC Model of Emotion   Jermaine Castaneda Humor in [place]x    Grief and Loss   The Mindfulness Movie    Healthy vs. Unhealthy Feelings   Nik LAM documentary     Meditation/Mindfulness       Overconfidence/Complacency       Resentments       Stress         "

## 2021-06-08 NOTE — TELEPHONE ENCOUNTER
The patient would like the provider to know that he does not have any medication, last does was this morning 5.12.2020.     The patient does have a return appt on 5.22.2020.

## 2021-06-08 NOTE — PROGRESS NOTES
"Mental Health Visit Note    1/12/2017    Start time: 1300    Stop Time: 1350   Session # 1    Rishi Miramontes is a 66 y.o. male is being seen today for    Chief Complaint   Patient presents with     MH Follow Up     Anxiety     New symptoms or complaints: None    Functional Impairment:   Personal: 2  Family: 3  Work: 2  Social:3    Clinical assessment of mental status: The patient was on time for their scheduled session.  They were appropriately dressed with good grooming and hygiene.  The patient was oriented X3.  Patient was pleasant and cooperative.  Mood and affect were anxious and congruent with his speech.   The patient made appropriate eye contact.  Recent and remote memories were intact.  Thought process was logical and linear.  Speech/language (tone and rate) were pressured.  Insight and judgment were adequate.    Suicidal/Homicidal Ideation present: None Reported This Session    Patient's impression of their current status:  Patient comes to this session requesting to discharge from psychotherapy, indicating progress and achievement of his objective, that is insight into his nightmares.  He denies depression and/or anxiety, indicating instead that he is a Type A personality.  He does acknowledge that others sometimes might think he is more agitated/upset than he is, but that again he says is how he is.      Therapist impression of patients current state:  Patient will be discharged from psychotherapy per his request.  Patient continues to question a historical diagnosis of Bipolar Disorder, however this could well be accurate.  Patient continues to display/report borderline socially appropriate interactions.  For example, he will walk up to strangers and tell them, \"you are beautiful\".  He continues to have mood swings that sometimes will escalate into verbal aggressiveness.  His cd treatment notes were reviewed, and he has some inappropriate/distracting moments in group.  Patient's request for dc " honored with the invitation to reschedule should his distressing symptoms return.  Patient's psychiatric provider updated.    Type of psychotherapeutic technique provided: Client centered    Progress toward short term goals:Progress as expected, patient reports continued sobriety and improved mood and insight.     Review of long term goals: Not done at today's visit    Diagnosis:   1. Unspecified mood (affective) disorder    2. Opioid use disorder, severe, in early remission, on maintenance therapy, dependence    3. Alcohol use disorder, severe, in early remission        Plan and Follow up: Patient will dc from psychotherapy at this time per his request, goal achievement.      Discharge Criteria/Planning: Patient will dc from psychotherapy.  Should he wish to re-engage, he is directed to call the scheduling line.    Semaj Chao 1/12/2017      This note was created with help of Dragon dictation software. Grammatical / typing errors are not intentional and inherent to the software.

## 2021-06-08 NOTE — PROGRESS NOTES
Weekly Progress Note  Rishi Miramontes  1950  [id number]      D) Pt attended 1 groups  this week with 0 absences. A) Staff facilitated groups and reviewed tx progress. Assessed for [facility]. R) No VAP needed at this time. Pt working on the following dimensions:    Dimension #1 - Withdrawal Potential - Risk 0, no concern. Pt reports last use as 9/20/2016. Pt is on suboxone and is making his appointments.   Dimension #2 - Biomedical - Risk 1, mild concern. Pt admits that he has had chronic pain that he had been ignoring due to his drinking. Pt reports that he has reengaged physical therapy and [facility]. Pt brings a soft neck brace into group and uses it when his pain gets intense.  Medical issues are not a barrier to treatment participation  Dimension #3 - Emotional/Behavioral/Cognitive - Risk 1, mild concern. Pt reports diagnosed depression with anxiety. Pt reports he is managing with medication and therapy in the [facility], [facility]. Pt continues to be disruptive in group and often appears focused on seeking attention rather than participating in group session. Staff is concerned with what appears to be the Pt seeking attention from group members aged in their 20's. Pt reported some stress due to son recently getting a DUI and wanting tx services for him.   Dimension #4 - Treatment Acceptance/Resistance - Risk 1, mild concern. Pt reports a strong desire to live a sober lifestyle and meet treatment expectations, but does not always follow through on all recommendations. Pt has good group attendance, and appears to be complying with expectations.   Dimension #5 - Relapse Potential - Risk 2, moderate concern. Pt remains vulnerable to relapse due to coping skills are still developing and long history of return to use.   Dimension #6 - Recovery Environment - Risk 2, moderate concern. Pt reports renting space after losing his home and  spouse. Pt reports environment  "can be stressful due to difficult landlord. Pt does not work, but engages in his hobby of music.  Pt is engaged in the sober community and appears to really enjoy fellowship time with peers in group.  Pt reports relationships with his family are strained. Pt reports no legal concerns.     T) Client educated on Relapse. Client has completed 89 hours of IOP. Pt has completed 17 hours of RP.  Projected discharge date is 1/27/17. Current discharge plan is TBD.     Donald Welander, LADC        Psycho-Educational Curriculum  Date Attended  Psycho-Educational Curriculum  Date Attended    Acceptance   Shame/Guilt     1st Step   Anger/Rage     Affirmations   Mental Health     Automatic Negative Thoughts   Anxiety     Cross Addiction   Co-Occurring Disorders     Stages of Change   Johana/Bipolar     Relapse   Trauma      Addictive Thoughts   Victim Identity     Coping Skills   Sober Structure     Relapse Prevention   Continuum of Care     Medical Aspects   Non-12 Step Support     Brain/Neurotransmitters   Priorities     Medication Compliance   Spirituality     BEBETO Alcohol/Drug Research   Weekend Planner     Physical Health   Educational Videos     Post Acute Withdrawal   1st Step     Pregnancy and Drug Use   2nd Step     Sexual Health   Assertive Communication     Short-Term/Long-Term Effects   My name is Nik LAM    Relationships   Cross Addiction     Assertive Communication   God As We Understood Him     Boundaries   HBO Relapse     Codependence    HBO What Is Addiction     Defense Mechanisms    Medical Aspects 1     Family Roles   Medical Aspects 2     Goodbye Letter   National Geographic: Stress     Intimacy   PBS Depression Out of the Shadows     Needs/Dealbreakers in Relationships   The Anonymous People    Socialization Skills   [place]     Feelings   [place] \"Highjacked Brain\"    ABC Model of Emotion   Jermaine Castaneda Humor in Tx    Grief and Loss   The Mindfulness Movie    Healthy vs. Unhealthy Feelings   Nik LAM " documentary     Meditation/Mindfulness       Overconfidence/Complacency       Resentments       Stress

## 2021-06-08 NOTE — PROGRESS NOTES
Patient here today for follow up of medication management. States he has maintain sobriety from all substances. States he has been going to 4 meetings a week. Depression 0/5 denies SI/HI. States life is fantastic right now.

## 2021-06-08 NOTE — TELEPHONE ENCOUNTER
Date of Last Office Visit: 3/27/20  Date of Next Office Visit: 5/22/20  No shows since last visit: no  Cancellations since last visit: no  ED visits since last visit: no    Medication Effexor  mg date last ordered: 4/23/20  Qty: 90  Refills: 1  Called [facility] in [place]. According to staff they never received this prescription    Lapse in therapy greater than 7 days: unknown  Medication refill request verified as identical to current order: yes  Result of Last DAM, VPA, Li+ Level, CBC, or Carbamazepine Level (at or since last visit): Positive DAM for THC on 1/31/20        []Eligibility - not seen in last year    []Supervision - no future appointment    []Compliance     []Verification - order discrepancy    []Controlled Medication    []90 - day supply request    [x]Other LPN pending medications    Current Medication list:      aspirin 81 MG EC tablet  Take 81 mg by mouth daily.    buprenorphine-naloxone (SUBOXONE SL TABLET) 8-2 mg Subl per sublingual tab  1 tablet three times a day    buPROPion (WELLBUTRIN SR) 150 MG 12 hr tablet  TAKE 2 TABLETS(300 MG) BY MOUTH EVERY MORNING. DO NOT CRUSH    calcium, as carbonate, (TUMS) 200 mg calcium (500 mg) chewable tablet  Chew 1 tablet daily as needed for heartburn.    furosemide (LASIX) 20 MG tablet  Take 20 mg by mouth daily as needed.    gabapentin (NEURONTIN) 600 MG tablet  TAKE 1 TABLET(600 MG) BY MOUTH THREE TIMES DAILY    gabapentin (NEURONTIN) 600 MG tablet  TAKE 1 TABLET(600 MG) BY MOUTH THREE TIMES DAILY    traZODone (DESYREL) 100 MG tablet  TAKE 1 TO 1 AND 1/2 TABLETS BY MOUTH EVERY NIGHT 1 HOUR BEFORE BEDTIME    venlafaxine (EFFEXOR XR) 150 MG 24 hr capsule  Take 1 capsule (150 mg total) by mouth daily.        Medication Plan of Care at last office visit with MD/CNP:    PLAN:  1. Patient to continue on buprenorphine-naloxone 8/2 mg SL tablet three times a day, and a 30-day supply was given. Strongly applauded the patient for ceasing use of beverage  alcohol and I encouraged him to consider attending 12 step meetings.   2. Continue to take effexor on a daily basis, 150 mg po daily. Monitor mood closely. Strongly recommended patient attend 12 step meetings at this time.   3. Patient encouraged to consider attending outpatient CD treatment.   4. Patient urine toxicology will be deferred at this time given current coronavjr  5. Discussed at length the need for coronavirs distanging guidance bounfr-aei  6. Patient to rtc in 1 month for follow-up and sooner prn.        [place], [place], [place], and [place] : NA

## 2021-06-08 NOTE — PROGRESS NOTES
Weekly Progress Note  Rishi Miramontes  1950  [id number]      D) Pt attended 1 groups  this week with 0 absences. A) Staff facilitated groups and reviewed tx progress. Assessed for [facility]. R) No VAP needed at this time. Pt working on the following dimensions:    Dimension #1 - Withdrawal Potential - Risk 0, no concern. Pt reports last use as 9/20/2016. Pt is on suboxone and is making his appointments.   Dimension #2 - Biomedical - Risk 1, mild concern. Pt admits that he has had chronic pain that he had been ignoring due to his drinking. Pt reports that he has reengaged physical therapy and has been going to the [facility] to use the therapy pool. Pt brings a soft neck brace into group and uses it when his pain gets intense.  Medical issues are not a barrier to treatment participation  Dimension #3 - Emotional/Behavioral/Cognitive - Risk 1, mild concern. Pt reports diagnosed depression with anxiety. Pt reports he is no longer going to 1x1 therapy.  Pt reported some stress due to son finishing residential treatment.   Dimension #4 - Treatment Acceptance/Resistance - Risk 0, no concern. Pt reports a strong desire to live a sober lifestyle and meet treatment expectations. Pt has good group attendance.  Dimension #5 - Relapse Potential - Risk 1, mild concern. Pt remains vulnerable to relapse due to coping skills are still developing and long history of return to use. However, Pt appears to have made measurable progress with developing sober living skills.   Dimension #6 - Recovery Environment - Risk 2, moderate concern. Pt reports renting space after losing his home and  spouse. Pt reports environment can be stressful due to difficult landlord. Pt does not work, but engages in his hobby of music.  Pt is engaged in the sober community and appears to really enjoy fellowship time with peers in group.  Pt reports relationships with his family are strained. Pt reports no legal concerns. Pt reports he has been  "attending CMA, AA, and an LGBT group. 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    T) Client educated on Living in the Moment. Client has completed 89 hours of IOP. Pt has completed 25 hours of RP.  Projected discharge date is 2/17/17. Current discharge plan is TBD.     Donald Welander, LADC        Psycho-Educational Curriculum  Date Attended  Psycho-Educational Curriculum  Date Attended    Acceptance   Shame/Guilt     1st Step   Anger/Rage     Affirmations   Mental Health     Automatic Negative Thoughts   Anxiety     Cross Addiction   Co-Occurring Disorders     Stages of Change   Johana/Bipolar     Relapse   Trauma      Addictive Thoughts   Victim Identity     Coping Skills   Sober Structure     Relapse Prevention   Continuum of Care     Medical Aspects   Non-12 Step Support     Brain/Neurotransmitters   Priorities     Medication Compliance   Spirituality     BEBETO Alcohol/Drug Research   Weekend Planner     Physical Health   Educational Videos     Post Acute Withdrawal   1st Step     Pregnancy and Drug Use   2nd Step     Sexual Health   Assertive Communication     Short-Term/Long-Term Effects   My name is Nik LAM    Relationships   Cross Addiction     Assertive Communication   God As We Understood Him     Boundaries   HBO Relapse     Codependence    HBO What Is Addiction     Defense Mechanisms    Medical Aspects 1     Family Roles   Medical Aspects 2     Goodbye Letter   National Geographic: Stress     Intimacy   PBS Depression Out of the Shadows     Needs/Dealbreakers in Relationships   The Anonymous People    Socialization Skills   [place]     Feelings   [place] \"Highjacked Brain\"    ABC Model of Emotion   Jermaine Castaneda Humor in Tx    Grief and Loss   The Mindfulness Movie    Healthy vs. Unhealthy Feelings   Nik LAM documentary     Meditation/Mindfulness       Overconfidence/Complacency       Resentments       Stress         "

## 2021-06-08 NOTE — PROGRESS NOTES
Correct pharmacy verified with patient and confirmed in snapshot? [x] yes []no    Medications Phoned  to Pharmacy [] yes [x]no  Name of Pharmacist:  List Medications, including dose, quantity and instructions      Medication Prescriptions given to patient   [] yes  [] no   List the name of the drug the prescription was written for.   Printed for [facility] walked to [facility]       Medications ordered this visit were e-scribed.  Verified by order class [] yes  [x] no    Medication changes or discontinuations were communicated to patient's pharmacy: [] yes  [x] no    UA collected [] yes    [x] no    [place] Prescription Monitoring Program Reviewed? [] yes  [x] no    Referrals were made to:  none    Future appointment was made: [x] yes  [] no    Dictation completed at time of chart check: [x] yes  [] no    I have checked the documentation for today s encounters and the above information has been reviewed and completed.

## 2021-06-08 NOTE — PROGRESS NOTES
Weekly Progress Note  MalvinshaRishi de jesus  1950  [id number]      D) Pt attended 1 groups  this week with 0 absences. A) Staff facilitated groups and reviewed tx progress. Assessed for [facility]. R) No VAP needed at this time. Pt working on the following dimensions:    Dimension #1 - Withdrawal Potential - Risk 0, no concern. Pt reports last use as 9/20/2016. Pt is on suboxone and is making his appointments.   Dimension #2 - Biomedical - Risk 1, mild concern. Pt admits that he has had chronic pain that he had been ignoring due to his drinking. Pt reports that he has reengaged physical therapy and has been going to the [employer] to use the therapy pool. Pt brings a soft neck brace into group and uses it when his pain gets intense.  Medical issues are not a barrier to treatment participation  Dimension #3 - Emotional/Behavioral/Cognitive - Risk 1, mild concern. Pt reports diagnosed depression with anxiety. Pt reports he is managing with medication and therapy in the [facility], [facility]. Pt's behavior has leveled some during the past two group sessions. Pt is in a group on Thursdays with peers closer to his own age and he appears to benefit from this. Pt reported he attended another LGBT group and appears to be questioning his sexuality.   Dimension #4 - Treatment Acceptance/Resistance - Risk 0, no concern. Pt reports a strong desire to live a sober lifestyle and meet treatment expectations. Pt has good group attendance.  Dimension #5 - Relapse Potential - Risk 1, mild concern. Pt remains vulnerable to relapse due to coping skills are still developing and long history of return to use. However, Pt appears to have made measurable progress with developing sober living skills.   Dimension #6 - Recovery Environment - Risk 2, moderate concern. Pt reports renting space after losing his home and  spouse. Pt reports environment can be stressful due to difficult landlord. Pt does not work, but engages in his hobby of music.   "Pt is engaged in the sober community and appears to really enjoy fellowship time with peers in group.  Pt reports relationships with his family are strained. Pt reports no legal concerns. Pt reports he has been attending CMA, AA, and an LGBT group.     T) Client educated on Prioritizing sobriety. Client has completed 89 hours of IOP. Pt has completed 21 hours of RP.  Projected discharge date is 2/3/17. Current discharge plan is TBD.     Donald Welander, LADC        Psycho-Educational Curriculum  Date Attended  Psycho-Educational Curriculum  Date Attended    Acceptance   Shame/Guilt     1st Step   Anger/Rage     Affirmations   Mental Health     Automatic Negative Thoughts   Anxiety     Cross Addiction   Co-Occurring Disorders     Stages of Change   Johana/Bipolar     Relapse   Trauma      Addictive Thoughts   Victim Identity     Coping Skills   Sober Structure     Relapse Prevention   Continuum of Care     Medical Aspects   Non-12 Step Support     Brain/Neurotransmitters   Priorities     Medication Compliance   Spirituality     BEBETO Alcohol/Drug Research   Weekend Planner     Physical Health   Educational Videos     Post Acute Withdrawal   1st Step     Pregnancy and Drug Use   2nd Step     Sexual Health   Assertive Communication     Short-Term/Long-Term Effects   My name is Nik LAM    Relationships   Cross Addiction     Assertive Communication   God As We Understood Him     Boundaries   HBO Relapse     Codependence    HBO What Is Addiction     Defense Mechanisms    Medical Aspects 1     Family Roles   Medical Aspects 2     Goodbye Letter   National Geographic: Stress     Intimacy   PBS Depression Out of the Shadows     Needs/Dealbreakers in Relationships   The Anonymous People    Socialization Skills   [place]     Feelings   [place] \"Highjacked Brain\"    ABC Model of Emotion   Jermaine Castaneda Humor in Tx    Grief and Loss   The Mindfulness Movie    Healthy vs. Unhealthy Feelings   Nik LAM documentary   "   Meditation/Mindfulness       Overconfidence/Complacency       Resentments       Stress

## 2021-06-08 NOTE — PROGRESS NOTES
Weekly Progress Note  AnabeledsonRishi  1950  [id number]      D) Pt attended 1 groups  this week with 0 absences. A) Staff facilitated groups and reviewed tx progress. Assessed for [facility]. R) No VAP needed at this time. Pt working on the following dimensions:    Dimension #1 - Withdrawal Potential - Risk 0, no concern. Pt reports last use as 9/20/2016. Pt is on suboxone and is making his appointments.   Dimension #2 - Biomedical - Risk 1, mild concern. Pt admits that he has had chronic pain that he had been ignoring due to his drinking. Pt reports that he has reengaged physical therapy and has been going to the [employer] [facility] [employer] to use the therapy pool.   Medical issues are not a barrier to treatment participation  Dimension #3 - Emotional/Behavioral/Cognitive - Risk 1, mild concern. Pt reports diagnosed depression with anxiety. Pt reports he is no longer going to 1x1 therapy.  Pt presented in a good mood and wrote on his check in note that he is in love. Pt did not provide specifics to group peers or staff.   Dimension #4 - Treatment Acceptance/Resistance - Risk 0, no concern. Pt reports a strong desire to live a sober lifestyle and meet treatment expectations. Pt has good group attendance.  Dimension #5 - Relapse Potential - Risk 1, mild concern. Pt remains vulnerable to relapse due to coping skills are still developing and long history of return to use. However, Pt appears to have made measurable progress with developing sober living skills.   Dimension #6 - Recovery Environment - Risk 2, moderate concern. Pt reports renting space after losing his home and  spouse. Pt reports environment can be stressful due to difficult landlord. Pt does not work, but engages in his hobby of music.  Pt is engaged in the sober community and appears to really enjoy fellowship time with peers in group.  Pt reports relationships with his family are strained. Pt reports no legal concerns. Pt reports he has been attending [facility],  "AA, and an LGBT group. 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    T) Client educated on Anhedonia. Client has completed 89 hours of IOP. Pt has completed 27 hours of RP.  Projected discharge date is 3/3/17. Current discharge plan is TBD.     Donald Welander, LADC        Psycho-Educational Curriculum  Date Attended  Psycho-Educational Curriculum  Date Attended    Acceptance   Shame/Guilt     1st Step   Anger/Rage     Affirmations   Mental Health     Automatic Negative Thoughts   Anxiety     Cross Addiction   Co-Occurring Disorders     Stages of Change   Johana/Bipolar     Relapse   Trauma      Addictive Thoughts   Victim Identity     Coping Skills   Sober Structure     Relapse Prevention   Continuum of Care     Medical Aspects   Non-12 Step Support     Brain/Neurotransmitters   Priorities     Medication Compliance   Spirituality     BEBETO Alcohol/Drug Research   Weekend Planner     Physical Health   Educational Videos     Post Acute Withdrawal   1st Step     Pregnancy and Drug Use   2nd Step     Sexual Health   Assertive Communication     Short-Term/Long-Term Effects   My name is Nik Abarca   Cross Addiction     Assertive Communication   God As We Understood Him     Boundaries   HBO Relapse     Codependence    HBO What Is Addiction     Defense Mechanisms    Medical Aspects 1     Family Roles   Medical Aspects 2     Goodbye Letter   National Geographic: Stress     Intimacy   PBS Depression Out of the Shadows     Needs/Dealbreakers in Relationships   The Anonymous People    Socialization Skills   [place]     Feelings   [place] \"Highjacked Brain\"    ABC Model of Emotion   Jermaine Castaneda Humor in Tx    Grief and Loss   The Mindfulness Movie    Healthy vs. Unhealthy Feelings   Nik LAM documentary     Meditation/Mindfulness       Overconfidence/Complacency       Resentments       Stress         "

## 2021-06-08 NOTE — PROGRESS NOTES
Weekly Progress Note  Rishi Miramontes  1950  [id number]      D) Pt attended 1 groups  this week with 0 absences. A) Staff facilitated groups and reviewed tx progress. Assessed for VA. R) No VAP needed at this time. Pt working on the following dimensions:    Dimension #1 - Withdrawal Potential - Risk 0, no concern. Pt reports last use as 9/20/2016. Pt is on suboxone and is making his appointments.   Dimension #2 - Biomedical - Risk 1, mild concern. Pt admits that he has had chronic pain that he had been ignoring due to his drinking. Pt reports that he has reengaged physical therapy and has been going to the [facility] to use the therapy pool. Pt brings a soft neck brace into group and uses it when his pain gets intense.  Medical issues are not a barrier to treatment participation  Dimension #3 - Emotional/Behavioral/Cognitive - Risk 1, mild concern. Pt reports diagnosed depression with anxiety. Pt reports he is managing with medication and therapy in the [facility], [facility]. Pt continues to be disruptive in group and often appears focused on seeking attention rather than participating in group session. Staff is concerned with what appears to be the Pt seeking attention from group members aged in their 20's.   Dimension #4 - Treatment Acceptance/Resistance - Risk 1, mild concern. Pt reports a strong desire to live a sober lifestyle and meet treatment expectations, but does not always follow through on all recommendations. Pt has good group attendance, and was more supportive of peers and compliant with staff expectations.   Dimension #5 - Relapse Potential - Risk 2, moderate concern. Pt remains vulnerable to relapse due to coping skills are still developing and long history of return to use.   Dimension #6 - Recovery Environment - Risk 2, moderate concern. Pt reports renting space after losing his home and  spouse. Pt reports environment can be stressful due to difficult landlord. Pt does not work, but  "engages in his hobby of music.  Pt is engaged in the sober community and appears to really enjoy fellowship time with peers in group.  Pt reports relationships with his family are strained. Pt reports no legal concerns.     T) Client educated on Keeping it Simple. Client has completed 89 hours of IOP. Pt has completed 15 hours of RP.  Projected discharge date is 1/27/17. Current discharge plan is TBD.     Donald Welander, LADC        Psycho-Educational Curriculum  Date Attended  Psycho-Educational Curriculum  Date Attended    Acceptance   Shame/Guilt     1st Step   Anger/Rage     Affirmations   Mental Health     Automatic Negative Thoughts   Anxiety     Cross Addiction   Co-Occurring Disorders     Stages of Change   Johana/Bipolar     Relapse   Trauma      Addictive Thoughts   Victim Identity     Coping Skills   Sober Structure     Relapse Prevention   Continuum of Care     Medical Aspects   Non-12 Step Support     Brain/Neurotransmitters   Priorities     Medication Compliance   Spirituality     BEBETO Alcohol/Drug Research   Weekend Planner     Physical Health   Educational Videos     Post Acute Withdrawal   1st Step     Pregnancy and Drug Use   2nd Step     Sexual Health   Assertive Communication     Short-Term/Long-Term Effects   My name is Nik LAM    Relationships   Cross Addiction     Assertive Communication   God As We Understood Him     Boundaries   HBO Relapse     Codependence    HBO What Is Addiction     Defense Mechanisms    Medical Aspects 1     Family Roles   Medical Aspects 2     Goodbye Letter   National Geographic: Stress     Intimacy   PBS Depression Out of the Shadows     Needs/Dealbreakers in Relationships   The Anonymous People    Socialization Skills   [place]     Feelings   [place] \"Highjacked Brain\"    ABC Model of Emotion   Jermaine Castaneda Humor in Tx    Grief and Loss   The Mindfulness Movie    Healthy vs. Unhealthy Feelings   Nik LAM documentary     Meditation/Mindfulness     "   Overconfidence/Complacency       Resentments       Stress

## 2021-06-08 NOTE — PROGRESS NOTES
Weekly Progress Note  MalvingloriaRishi  1950  [id number]      D) Pt attended 1 groups  this week with 0 absences. A) Staff facilitated groups and reviewed tx progress. Assessed for [facility]. R) No VAP needed at this time. Pt working on the following dimensions:    Dimension #1 - Withdrawal Potential - Risk 0, no concern. Pt reports last use as 9/20/2016. Pt is on suboxone and is making his appointments.   Dimension #2 - Biomedical - Risk 1, mild concern. Pt admits that he has had chronic pain that he had been ignoring due to his drinking. Pt reports that he has reengaged physical therapy and has been going to the [facility] to use the therapy pool. Pt brings a soft neck brace into group and uses it when his pain gets intense.  Medical issues are not a barrier to treatment participation  Dimension #3 - Emotional/Behavioral/Cognitive - Risk 1, mild concern. Pt reports diagnosed depression with anxiety. Pt reports he is managing with medication and therapy in the [facility], [facility]. Pt's behavior has leveled some during the past two group sessions. Pt is in a group on Thursdays with peers closer to his own age and he appears to benefit from this. Pt reported some relief due to son being admitted to a residential treatment program.   Dimension #4 - Treatment Acceptance/Resistance - Risk 0, no concern. Pt reports a strong desire to live a sober lifestyle and meet treatment expectations. Pt has good group attendance.  Dimension #5 - Relapse Potential - Risk 2, moderate concern. Pt remains vulnerable to relapse due to coping skills are still developing and long history of return to use.   Dimension #6 - Recovery Environment - Risk 2, moderate concern. Pt reports renting space after losing his home and  spouse. Pt reports environment can be stressful due to difficult landlord. Pt does not work, but engages in his hobby of music.  Pt is engaged in the sober community and appears to really enjoy fellowship time with  "peers in group.  Pt reports relationships with his family are strained. Pt reports no legal concerns.     T) Client educated on Sober Support. Client has completed 89 hours of IOP. Pt has completed 19 hours of RP.  Projected discharge date is 2/3/17. Current discharge plan is TBD.     Donald Welander, LADC        Psycho-Educational Curriculum  Date Attended  Psycho-Educational Curriculum  Date Attended    Acceptance   Shame/Guilt     1st Step   Anger/Rage     Affirmations   Mental Health     Automatic Negative Thoughts   Anxiety     Cross Addiction   Co-Occurring Disorders     Stages of Change   Johana/Bipolar     Relapse   Trauma      Addictive Thoughts   Victim Identity     Coping Skills   Sober Structure     Relapse Prevention   Continuum of Care     Medical Aspects   Non-12 Step Support     Brain/Neurotransmitters   Priorities     Medication Compliance   Spirituality     BEBETO Alcohol/Drug Research   Weekend Planner     Physical Health   Educational Videos     Post Acute Withdrawal   1st Step     Pregnancy and Drug Use   2nd Step     Sexual Health   Assertive Communication     Short-Term/Long-Term Effects   My name is Nik LAM    Relationships   Cross Addiction     Assertive Communication   God As We Understood Him     Boundaries   HBO Relapse     Codependence    HBO What Is Addiction     Defense Mechanisms    Medical Aspects 1     Family Roles   Medical Aspects 2     Goodbye Letter   National Geographic: Stress     Intimacy   PBS Depression Out of the Shadows     Needs/Dealbreakers in Relationships   The Anonymous People    Socialization Skills   [place]     Feelings   [place] \"Highjacked Brain\"    ABC Model of Emotion   Jermaine Castaneda Humor in Tx    Grief and Loss   The Mindfulness Movie    Healthy vs. Unhealthy Feelings   Nik LAM documentary     Meditation/Mindfulness       Overconfidence/Complacency       Resentments       Stress         "

## 2021-06-08 NOTE — TELEPHONE ENCOUNTER
Date of Last Office Visit: 3/27/20  Date of Next Office Visit: Made appt.today 6/16/20  No shows since last visit: 5/22/20   Cancellations since last visit: none  ED visits since last visit:  none  Medication trazodone 100mg date last ordered: 3/19/20  Qty: 45  Refills: 1  Lapse in therapy greater than 7 days: yes  Medication refill request verified as identical to current order: yes  Result of Last DAM, VPA, Li+ Level, CBC, or Carbamazepine Level (at or since last visit): N/A     [] Medication refilled per [facility] M-1.   [x] Medication unable to be refilled by RN due to criteria not met as indicated below:     []Eligibility - not seen in last year    [x]Supervision - no future appointment    [x]Compliance     []Verification - order discrepancy    []Controlled Medication    []Medication not included in RN Protocol    []90 - day supply request    []Other   Current Medication list:    aspirin 81 MG EC tablet  Take 81 mg by mouth daily.    buprenorphine-naloxone (SUBOXONE SL TABLET) 8-2 mg Subl per sublingual tab  1 tablet three times a day    buPROPion (WELLBUTRIN SR) 150 MG 12 hr tablet  TAKE 2 TABLETS(300 MG) BY MOUTH EVERY MORNING. DO NOT CRUSH    calcium, as carbonate, (TUMS) 200 mg calcium (500 mg) chewable tablet  Chew 1 tablet daily as needed for heartburn.    furosemide (LASIX) 20 MG tablet  Take 20 mg by mouth daily as needed.    gabapentin (NEURONTIN) 600 MG tablet  TAKE 1 TABLET(600 MG) BY MOUTH THREE TIMES DAILY    gabapentin (NEURONTIN) 600 MG tablet  TAKE 1 TABLET(600 MG) BY MOUTH THREE TIMES DAILY    traZODone (DESYREL) 100 MG tablet  TAKE 1 TO 1 AND 1/2 TABLETS BY MOUTH EVERY NIGHT 1 HOUR BEFORE BEDTIME    venlafaxine (EFFEXOR-XR) 150 MG 24 hr capsule  TAKE 1 CAPSULE(150 MG) BY MOUTH DAILY        Medication Plan of Care at last office visit with MD/CNP:    Diagnoses/Plan:  1. Patient to continue on buprenorphine-naloxone 8/2 mg SL tablet three times a day, and a 30-day supply was given. Strongly applauded  the patient for ceasing use of beverage alcohol and I encouraged him to consider attending 12 step meetings.   2. Continue to take effexor on a daily basis, 150 mg po daily. Monitor mood closely. Strongly recommended patient attend 12 step meetings at this time.   3. Patient encouraged to consider attending outpatient CD treatment.   4. Patient urine toxicology will be deferred at this time given current coronavjr  5. Discussed at length the need for coronavirs distanging guidance bounfr-aei  6. Patient to rtc in 1 month for follow-up and sooner prn.

## 2021-06-09 NOTE — PROGRESS NOTES
Weekly Progress Note  MalvingloriaRishi  1950  [id number]      D) Pt attended 1 groups  this week with 0 absences. A) Staff facilitated groups and reviewed tx progress. Assessed for [facility]. R) No VAP needed at this time. Pt working on the following dimensions:    Dimension #1 - Withdrawal Potential - Risk 0, no concern. Pt reports last use as 9/20/2016. Pt is on suboxone and is making his appointments.   Dimension #2 - Biomedical - Risk 1, mild concern. Pt admits that he has had chronic pain that he had been ignoring due to his drinking. Pt reports that he has reengaged physical therapy and has been going to the [facility] to use the therapy pool.   Medical issues are not a barrier to treatment participation  Dimension #3 - Emotional/Behavioral/Cognitive - Risk 1, mild concern. Pt reports diagnosed depression with anxiety. Pt reports he is no longer going to 1x1 therapy.  Pt presented in a good mood and wrote on his check in note that he is in love, again. Pt did not provide specifics to group peers or staff.   Dimension #4 - Treatment Acceptance/Resistance - Risk 0, no concern. Pt reports a strong desire to live a sober lifestyle and meet treatment expectations. Pt has good group attendance and will most likely resist moving on from group, but Pt appears very stable in recovery at this time.   Dimension #5 - Relapse Potential - Risk 1, mild concern. Pt remains vulnerable to relapse due to coping skills are still developing and long history of return to use. However, Pt appears to have made measurable progress with developing sober living skills.   Dimension #6 - Recovery Environment - Risk 1, mild concern. Pt reports renting space after losing his home and  spouse.  Pt is engaged in the sober community and appears to really enjoy fellowship time with peers in group.  Pt reports relationships with his family are strained. Pt reports no legal concerns. Pt reports he has been attending CMA, AA.  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    T) Client educated on Stages of Relapse. Client has completed 89 hours of IOP. Pt has completed 29 hours of RP.  Projected discharge date is 2/24/17. Current discharge plan is TBD.     Donald Welander, LADC        Psycho-Educational Curriculum  Date Attended  Psycho-Educational Curriculum  Date Attended    Acceptance   Shame/Guilt     1st Step   Anger/Rage     Affirmations   Mental Health     Automatic Negative Thoughts   Anxiety     Cross Addiction   Co-Occurring Disorders     Stages of Change   Johana/Bipolar     Relapse   Trauma      Addictive Thoughts   Victim Identity     Coping Skills   Sober Structure     Relapse Prevention   Continuum of Care     Medical Aspects   Non-12 Step Support     Brain/Neurotransmitters   Priorities     Medication Compliance   Spirituality     BEBETO Alcohol/Drug Research   Weekend Planner     Physical Health   Educational Videos     Post Acute Withdrawal   1st Step     Pregnancy and Drug Use   2nd Step     Sexual Health   Assertive Communication     Short-Term/Long-Term Effects   My name is Nik LAM    Relationships   Cross Addiction     Assertive Communication   God As We Understood Him     Boundaries   HBO Relapse     Codependence    HBO What Is Addiction     Defense Mechanisms    Medical Aspects 1     Family Roles   Medical Aspects 2     Goodbye Letter   National Geographic: Stress     Intimacy   PBS Depression Out of the Shadows     Needs/Dealbreakers in Relationships   The Anonymous People    Socialization Skills   [place]     Feelings   [place] \"Highjacked Brain\"    ABC Model of Emotion   Jermaine Castaneda Humor in Tx    Grief and Loss   The Mindfulness Movie    Healthy vs. Unhealthy Feelings   Nik LAM documentary     Meditation/Mindfulness       Overconfidence/Complacency       Resentments       Stress         "

## 2021-06-09 NOTE — PROGRESS NOTES
"Addiction  Nurse Only Visit Note    3/16/2017  Date of last visit: 2/16/17    Reason for today's visit: No chief complaint on file.       If having pain, who will address pain:  NA    Is pt assisted: No    Urine for toxicology sent today: yes    Last UA date: 2/16/17  Reviewed with patient: yes  Results: negative  BUP: done 11/16, due, ordered today      Pill count: No  (Controlled substance only)  Medications needing renewal: suboxone 8-2mg film BID #56, no refills called to the pharmacy spoke with Trey    Substance use history:    Using alcohol:No  Using street drugs or unprescribed medications: No  Using opioids other that Suboxone:No  Using tobacco:Yes: Describe: 1/4 pack of cigarettes per day    Recovery environment:  Attending formal chemical dependency programming: Yes: Describe: today is his last day of Nik's relapse prevention group  Attending \"outside\" mutual help meetings: No  Has a chemical dependency sponsor: No  Has other elements of structure: Yes: Describe: works 40 hours per week as a   Current Living Situation: rents an apartment, lives alone    Cravings: no    Sleep: sleeps fine    Depression: no    Anxiety: no    Panic Attacks: no    Paranoia: no      Hallucinations: no      Suicidal Ideation: no    Homicidal Ideation: no    Comments:  reviewed and embedded.                      Next appointment 4/13/17 in Nurse Only clinic                      Pt.'s insurance changed; he now has quite large co-pays, so he is budgeting which meds he can purchase.  There is a reduction in co-pay if he gets a 90 day supply of his non-controlled medications; gabapentin, venlafaxine and Trazodone.                       Per Trey at the pharmacy pt. Last got: Neurontin 100mg #120 on January 9                                                                                                                                        Effexor ER 150mg #30 on January 18                                           "           Pt. Reports he is out of gabapentin and Trazodone and has two days left of Effexor ER                                                                                     Trazodone 100mg #30 on                 Physical Problems:  artthritis hypertension        Education Done Today        Stacey Holt    3/16/2017 1:00 PM    [employer] [place] Date: 17  [employer] Query Report Page#: 1  Patient Rx History Report  AMANUEL VARGHESE  Search Criteria: Last Name 'amanuel' and First Name 'Halima' and  =  and Request Period =   to ' - 2 out of 2 Recipients Selected.  Fill Date Product, Str, Form Qty Days Pt ID Prescriber Written RX# N/R* Pharm Pay MED+  ---------- -------------------------------- ------ ---- --------- ---------- ---------- ------------ ----- --------- ---------- ------  2017 SUBOXONE 8 MG-2 MG SL FILM 22.00 11 [id number] 2017 [id number] R [id number] 480.0  2017 SUBOXONE 8 MG-2 MG SL FILM 30.00 15 [id number] 2017 [id number] N [id number] 480.0  2017 SUBOXONE 8 MG-2 MG SL FILM 60.00 30 [id number] 2017 [id number] N [id number] 480.0  2017 GABAPENTIN 100 MG CAPSULE 120.00 30 [id number] 2017 [id number] N [id number] 00.0  2017 SUBOXONE 8 MG-2 MG SL FILM 22.00 11 [id number] 2017 [id number] N [id number] 480.0  *N/R N=New R=Refill  +MED Daily  Prescribers for prescriptions listed  ----------------------------------------------------------------------------------------------------------------------------------  [id number] WILLIAM LITTLE MD; [facility], [address] [place] [address]  [id number] HALIMA GUEVARA MD; [address], [place] [address]  Pharmacies that dispensed prescriptions  listed  ----------------------------------------------------------------------------------------------------------------------------------  [id number] [employer] [facility] [facility] : [facility] # [id number], [address] [facility] [address],  [id number] [employer] [facility] : [facility] [id number], [address] [facility] [address],  Patients that match search criteria  ----------------------------------------------------------------------------------------------------------------------------------  [id number] PAULETTE COLLAZO 50; [address], [place] [address]  [id number] HAYDEN VARGHESE  50; [address], [place] [address]  MED Summary  This section displays cumulative MED values by unique recipient. The MED Max value is the maximum occurrence of cumulative MED  sustained for any 3 consecutive days. This value is calculated based on prescriptions dispensed during the date range requested.  -----------------------------------------------------------------------------------------------------------------------------------  960 AHLIMA BLAKE; 1950; [address], [place] [address]

## 2021-06-09 NOTE — PROGRESS NOTES
________________________________________  Medications Phoned  to Pharmacy [] yes [x]N/A  Name of Pharmacist: N/A  List Medications, including dose, quantity and instructions N/A    Medications ordered this visit were e-scribed.  Verified by order class [x] yes  [] no     Medication changes or discontinuations were communicated to patient's pharmacy: [] yes  [x] N/A    Dictation completed at time of chart check: [] yes  [x] no    I have checked the documentation for today s encounters and the above information has been reviewed and completed.

## 2021-06-09 NOTE — PROGRESS NOTES
Weekly Progress Note  MalvinshaRishi de jesus  1950  [id number]      D) Pt attended 1 groups  this week with 0 absences. A) Staff facilitated groups and reviewed tx progress. Assessed for VA. R) No VAP needed at this time. Pt working on the following dimensions:    Dimension #1 - Withdrawal Potential - Risk 0, no concern. Pt reports last use as 9/20/2016. Pt is on suboxone and is making his appointments.   Dimension #2 - Biomedical - Risk 1, mild concern. Pt admits that he has had chronic pain that he had been ignoring due to his drinking. Pt reports that he has reengaged physical therapy and has been going to the [facility] to use the therapy pool.   Medical issues are not a barrier to treatment participation  Dimension #3 - Emotional/Behavioral/Cognitive - Risk 1, mild concern. Pt reports diagnosed depression with anxiety. Pt reports he is no longer going to 1x1 therapy. Pt reports some stress while looking for a new apartment.   Dimension #4 - Treatment Acceptance/Resistance - Risk 0, no concern. Pt reports a strong desire to live a sober lifestyle and meet treatment expectations. Pt has good group attendance and will most likely resist moving on from group, but Pt appears very stable in recovery at this time.   Dimension #5 - Relapse Potential - Risk 1, mild concern. Pt remains vulnerable to relapse due to coping skills are still developing and long history of return to use. However, Pt appears to have made measurable progress with developing sober living skills.   Dimension #6 - Recovery Environment - Risk 1, mild concern. Pt reports renting space after losing his home and  spouse.  Pt is engaged in the sober community and appears to really enjoy fellowship time with peers in group.  Pt reports relationships with his family are strained. Pt reports no legal concerns. Pt reports he has been attending CMA, AA.  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    T)  "Client educated on Anger Mgmt. Client has completed 89 hours of IOP. Pt has completed 33 hours of RP.  Projected discharge date is 3/17/17. Current discharge plan is Pt to continue robust AA attendance.      Donald Welander, LADC        Psycho-Educational Curriculum  Date Attended  Psycho-Educational Curriculum  Date Attended    Acceptance   Shame/Guilt     1st Step   Anger/Rage     Affirmations   Mental Health     Automatic Negative Thoughts   Anxiety     Cross Addiction   Co-Occurring Disorders     Stages of Change   Johana/Bipolar     Relapse   Trauma      Addictive Thoughts   Victim Identity     Coping Skills   Sober Structure     Relapse Prevention   Continuum of Care     Medical Aspects   Non-12 Step Support     Brain/Neurotransmitters   Priorities     Medication Compliance   Spirituality     BEBETO Alcohol/Drug Research   Weekend Planner     Physical Health   Educational Videos     Post Acute Withdrawal   1st Step     Pregnancy and Drug Use   2nd Step     Sexual Health   Assertive Communication     Short-Term/Long-Term Effects   My name is Nik Winkler Addiction     Assertive Communication   God As We Understood Him     Boundaries   HBO Relapse     Codependence    HBO What Is Addiction     Defense Mechanisms    Medical Aspects 1     Family Roles   Medical Aspects 2     Goodbye Letter   National Geographic: Stress     Intimacy   PBS Depression Out of the Shadows     Needs/Dealbreakers in Relationships   The Anonymous People    Socialization Skills   [place]     Feelings   [place] \"Highjacked Brain\"    ABC Model of Emotion   Jermaine Castaneda Humor in Tx    Grief and Loss   The Mindfulness Movie    Healthy vs. Unhealthy Feelings   Nik LMA documentary     Meditation/Mindfulness       Overconfidence/Complacency       Resentments       Stress         "

## 2021-06-09 NOTE — TELEPHONE ENCOUNTER
Date of Last Office Visit:7-21-20    Date of Next Office Visit: 8-18-20  No shows since last visit: 0  Cancellations since last visit: 0  ED visits since last visit:  0  Medication Wellbutrin  date last ordered: 3-30-20  Qty: 180  Refills: 0  Lapse in therapy greater than 7 days: yes  Medication refill request verified as identical to current order: yes  Result of Last DAM, VPA, Li+ Level, CBC, or Carbamazepine Level (at or since last visit): N/A     [] Medication refilled per [facility] M-1.   [x] Medication unable to be refilled by RN due to criteria not met as indicated below:     []Eligibility - not seen in last year    []Supervision - no future appointment    []Compliance     []Verification - order discrepancy    []Controlled Medication    []Medication not included in RN Protocol    [x]90 - day supply request    []Other   Current Medication list:  Your Current Medications Are     aspirin 81 MG EC tablet  Take 81 mg by mouth daily.    buprenorphine-naloxone (SUBOXONE SL TABLET) 8-2 mg Subl per sublingual tab  1 tablet three times a day    buPROPion (WELLBUTRIN SR) 150 MG 12 hr tablet  TAKE 2 TABLETS(300 MG) BY MOUTH EVERY MORNING. DO NOT CRUSH    calcium, as carbonate, (TUMS) 200 mg calcium (500 mg) chewable tablet  Chew 1 tablet daily as needed for heartburn.    furosemide (LASIX) 20 MG tablet  Take 20 mg by mouth daily as needed.    gabapentin (NEURONTIN) 600 MG tablet  Take 1 tablet (600 mg total) by mouth 4 (four) times a day.    traZODone (DESYREL) 150 MG tablet  TAKE 1 TABLET BY MOUTH AS NEEDED FOR SLEEP.    venlafaxine (EFFEXOR-XR) 150 MG 24 hr capsule  TAKE 1 CAPSULE(150 MG) BY MOUTH DAILY        Medication Plan of Care at last office visit with MD/CNP: Plan:  1. Patient to continue on buprenorphine-naloxone 8/2 mg SL tablet three times a day, and a 30-day supply was given. Strongly recommended patient cease use of beverage alcohol. He feels he needs to move into sober housing to do so consistently.   2.  Continue to be on effexor 150 mg po daily; continue to monitor his mood.   3. Patient again encouraged to move into sober housing per his plans. Nurse to call and give patient Kena's phone number so he can schedule for a rule 25 re-assessment.  4. Patient also requested that I speak with his son, and I directed him to call and complete a release of information.  5. Patient urine toxicology to be deferred given the coronavirus pandemic.   6. Patient to rtc in 1 month and sooner prn.

## 2021-06-09 NOTE — PROGRESS NOTES
[facility] CHEMICAL DEPENDENCY   DISCHARGE SUMMARY       NAME:  Rishi Miramontes   Physician:  Ashwini   MRN:  [id number] :  Donald Welander BS, [facility]#:  [id number] Funding Source:  Medicare A&B, [employer]   Admit Date: 10/13/2016 Discharge Date: 3/20/2017     :  1950 Hours Completed: 89 hours of IOP, 35 hours of Relapse Prevention aftercare   Initial Diagnosis:    Severe Alcohol Use Disorder, F10.20  Severe Opioid Use Disorder, in remission, on agonist therapy, F11.20   Final Diagnosis:    Severe Alcohol Use Disorder, in early remission F10.20  Severe Opioid Use Disorder, in remission, on agonist therapy, F11.20   Discharge Address:    [address] [place] [address]        Discharge Type:  With Staff Approval (WSA)    Reasons for and circumstances of service termination:   Rishi has successfully completed both IOP and the Relapse Prevention aftercare program. Pt met all treatment attendance, engagement, and abstinence expectations and is discharged on this date, 3/20/2017, WSA.        Dimension/Course of Treatment/Individualized Care:   1. Withdrawal Potential - Risk level - 0, no concern. Pt reports last use of alcohol as 2016. Pt is on suboxone and is making his appointments.      2. Biomedical Conditions and Complications - Risk level - 1, mild concern. Pt admits that he has had chronic pain that he had been ignoring due to his drinking. Pt reports that he has reengaged physical therapy and has been going to the [facility] to use the therapy pool. Medical issues are not a barrier to treatment participation     3. Emotional/Behavioral/Cognitive Conditions and Complications - Risk level -  1, mild concern. Pt reports diagnosed depression with anxiety. Pt reports he is no longer going to 1x1 therapy. Pt reports some stress while looking for a new apartment.      4. Treatment Acceptance/Resistance - Risk Level -0, no concern. Pt reports a strong desire to live  a sober lifestyle and met treatment expectations.  Pt appears very stable in recovery at this time.      5. Relapse/Continued Use/Continued Problem Potential - 1, mild concern. Pt remains vulnerable to relapse due to coping skills are still developing and long history of return to use. However, Pt appears to have made measurable progress with developing sober living skills.      6. Recovery Environment - Risk level -1, mild concern. Pt reports renting space after losing his home and  spouse. Pt is engaged in the sober community and appears to really enjoy fellowship time with peers in group. Pt reports relationships with his family are strained. Pt reports no legal concerns. Pt reports he has been attending [facility], AA. 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     Services Provided: Intake, assessment, treatment planning, education, group discussion, film, lectures, 1x1 therapy, and recommendations at discharge.     Strengths: Supportive of peers. Good knowledge of addiction and recovery.   Needs: Continue to build sober support through self help groups.        Program Involvement: Good  Attendance: Good  Ability to relate in group/   Other program activities: Fair  Assignment Completion: Fair  Overall Behavior: Good  Reported Family/Significant   Other Involvement: Poor    Prognosis: Fair      Recommendations       Attend 12 Step Meetings, Obtain/Retain 12 Step Program Sponsor, Identify and Maintain a Sober Social and Network of Friends    Mental Health Referral  Individual Therapy and Med Compliance      Physical Health Referral:  Personal Physician                Counselor Name and Title:  Donald Welander BS, [facility]    Date:  3/20/2017  Time:  3:44 PM

## 2021-06-09 NOTE — PROGRESS NOTES
This video/telephone visit will be conducted via a call between you and your physician/provider. We have found that certain health care needs can be provided without the need for an in-person physical exam. This service lets us provide the care you need with a video /telephone conversation. If a prescription is necessary we can send it directly to your pharmacy. If lab work is needed we can place an order for that and you can then stop by our lab to have the test done at a later time.    Just as we bill insurance for in-person visits, we also bill insurance for video/telephone visits. If you have questions about your insurance coverage, we recommend that you speak with your insurance company.    Patient has given verbal consent for Telephone visit? Yes  Patient would like the video visit invitation sent by: Text to cell phone: [phone]  RN: Tracy CHEN    Patient verified allergies, medications and pharmacy via phone. PHQ : 12 and JERZY: 9 done verbally with writer. Patient states he is ready for visit.

## 2021-06-09 NOTE — TELEPHONE ENCOUNTER
Patient gave verbal permission for son to have Person to Person Communication. Patient called and given Kena Cantu's contact number to update his CD Assessment (Rule 25)

## 2021-06-09 NOTE — PROGRESS NOTES
________________________________________  Medications Phoned  to Pharmacy [] yes [x]no  Name of Pharmacist:  List Medications, including dose, quantity and instructions    Medications ordered this visit were e-scribed.  Verified by order class [x] yes  [] no  Gabapentin and Trazodone    Medication changes or discontinuations were communicated to patient's pharmacy: [x] yes  [] no spoke with Zaria one Gabapentin and Trazodone    Dictation completed at time of chart check: [x] yes  [] no    I have checked the documentation for today s encounters and the above information has been reviewed and completed.

## 2021-06-09 NOTE — TELEPHONE ENCOUNTER
Pt had visit today. Script for Trazodone #30 with one refill was sent to pharmacy. However, pt is requesting a 90 day supply. Medication pended to provider for review.

## 2021-06-09 NOTE — PROGRESS NOTES
Weekly Progress Note  MalvingloriaRishi  1950  [id number]      D) Pt attended 1 groups  this week with 0 absences. A) Staff facilitated groups and reviewed tx progress. Assessed for [facility]. R) No VAP needed at this time. Pt working on the following dimensions:    Dimension #1 - Withdrawal Potential - Risk 0, no concern. Pt reports last use as 9/20/2016. Pt is on suboxone and is making his appointments.   Dimension #2 - Biomedical - Risk 1, mild concern. Pt admits that he has had chronic pain that he had been ignoring due to his drinking. Pt reports that he has reengaged physical therapy and has been going to the [facility] to use the therapy pool.   Medical issues are not a barrier to treatment participation  Dimension #3 - Emotional/Behavioral/Cognitive - Risk 1, mild concern. Pt reports diagnosed depression with anxiety. Pt reports he is no longer going to 1x1 therapy.  Pt presented in a good mood and wrote on his check in note that he is in love, again. Pt did not provide specifics to group peers or staff.   Dimension #4 - Treatment Acceptance/Resistance - Risk 0, no concern. Pt reports a strong desire to live a sober lifestyle and meet treatment expectations. Pt has good group attendance and will most likely resist moving on from group, but Pt appears very stable in recovery at this time.   Dimension #5 - Relapse Potential - Risk 1, mild concern. Pt remains vulnerable to relapse due to coping skills are still developing and long history of return to use. However, Pt appears to have made measurable progress with developing sober living skills.   Dimension #6 - Recovery Environment - Risk 1, mild concern. Pt reports renting space after losing his home and  spouse.  Pt is engaged in the sober community and appears to really enjoy fellowship time with peers in group.  Pt reports relationships with his family are strained. Pt reports no legal concerns. Pt reports he has been attending [facility], AA.  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    T) Client educated on Acceptance. Client has completed 89 hours of IOP. Pt has completed 31 hours of RP.  Projected discharge date is 3/3/17. Current discharge plan is Pt to continue robust AA attendance.      Donald Welander, LADC        Psycho-Educational Curriculum  Date Attended  Psycho-Educational Curriculum  Date Attended    Acceptance   Shame/Guilt     1st Step   Anger/Rage     Affirmations   Mental Health     Automatic Negative Thoughts   Anxiety     Cross Addiction   Co-Occurring Disorders     Stages of Change   Johana/Bipolar     Relapse   Trauma      Addictive Thoughts   Victim Identity     Coping Skills   Sober Structure     Relapse Prevention   Continuum of Care     Medical Aspects   Non-12 Step Support     Brain/Neurotransmitters   Priorities     Medication Compliance   Spirituality     BEBETO Alcohol/Drug Research   Weekend Planner     Physical Health   Educational Videos     Post Acute Withdrawal   1st Step     Pregnancy and Drug Use   2nd Step     Sexual Health   Assertive Communication     Short-Term/Long-Term Effects   My name is Nik LAM    Relationships   Cross Addiction     Assertive Communication   God As We Understood Him     Boundaries   HBO Relapse     Codependence    HBO What Is Addiction     Defense Mechanisms    Medical Aspects 1     Family Roles   Medical Aspects 2     Goodbye Letter   [employer] Geographic: Stress     Intimacy   PBS Depression Out of the Shadows     Needs/Dealbreakers in Relationships   The Anonymous People    Socialization Skills   Fine     Feelings   [place] \"Highjacked Brain\"    ABC Model of Emotion   Jermaine Castaneda Humor in Tx    Grief and Loss   The Mindfulness Movie    Healthy vs. Unhealthy Feelings   Nik LAM documentary     Meditation/Mindfulness       Overconfidence/Complacency       Resentments       Stress         "

## 2021-06-10 NOTE — PROGRESS NOTES
Correct pharmacy verified with patient and confirmed in snapshot? [x] yes []no    Medications Phoned  to Pharmacy [x] yes []no  Name of Pharmacist:  List Medications, including dose, quantity and instructions  Suboxone 8-2 mg film called to Pam at [facility], #60 refill 0. Read back for accuracy.     Medication Prescriptions given to patient   [] yes  [x] no   List the name of the drug the prescription was written for.       Medications ordered this visit were e-scribed.  Verified by order class [] yes  [x] no    Medication changes or discontinuations were communicated to patient's pharmacy: [] yes  [x] no    UA collected [x] yes    [] no    [place] Prescription Monitoring Program Reviewed? [x] yes  [] no    Referrals were made to: none    Future appointment was made: [x] yes  [] no    Dictation completed at time of chart check: [x] yes  [] no    I have checked the documentation for today s encounters and the above information has been reviewed and completed.

## 2021-06-10 NOTE — PROGRESS NOTES
"Addiction  Nurse Only Visit Note    4/13/2017  Date of last visit: 3/16/17    Reason for today's visit:   Chief Complaint   Patient presents with     Follow-up       Vitals:    04/13/17 1306   Pulse: 97   Temp: 98.6  F (37  C)   Weight: 179 lb (81.2 kg)   Height: 5' 9\" (1.753 m)        If having pain, who will address pain:  No pain    Is pt assisted: No    Urine for toxicology sent today: yes    Last UA date: 3/16/17  Reviewed with patient: yes  Results: DAM neg, BUP pos    AIMS:     Pill count: NA  (Controlled substance only)  Medications needing renewal: see note    Substance use history:    Using alcohol:No  Using street drugs or unprescribed medications: No  Using opioids other that Suboxone:No  Using tobacco:Yes: Describe: 1/3 pack/day    Recovery environment:  Attending formal chemical dependency programming: No  Attending \"outside\" mutual help meetings: Yes: Describe: periodically attends meetings due to work schedule, went to one last monday  Has a chemical dependency sponsor: No  Has other elements of structure: Yes: Describe: working, records [employer]    Current Living Situation: own place    Cravings: no    Sleep: no, sleeps 6 hr/noc    Depression: no    Anxiety: no    Panic Attacks: no    Paranoia: no    Hallucinations: no    Suicidal Ideation: no    Homicidal Ideation:no  Comments: none    Physical Problems: no, BP checked twice per pt's request, happy he had lost a few pounds    [place]  was reviewed prior to seeing patient today. See below for embedded report.    Note: Pt in a hurry today, needed to return to work. No issues or concerns related to Suboxone. Biggest challenge is trying to budget his medication costs, says the pharmacy has been very helpful/flexible. He says that should get better soon when he meets his deductible and gets all of his meds on his Part D.     Suboxone 8/2 mg sl film, takes bid, #56 0-RF, called to pharmacy, spoke with pharmacist Trey, order read back for " accuracy.    Patient Instructions   Continue Medications as ordered.  No alcohol or unprescribed drug use.  No driving, if sedated.  Come to the Emergency Room if not feeling safe.  Call the clinic with any questions [phone].  Follow up as directed, for your appointments, per your After Visit Summary Form.      Call the clinic if any concerns: [facility] [phone]  [facility] [phone]  and Report to the emergency room if you feel like harming yourself or others or are psychotic        Kelly Peralta    2017 1:14 PM    HALLIENHANDAVION VARGHESE  Search Criteria: Last Name 'amanuel' and First Name 'yudith' and  =  and Request Period =   to ' - 2 out of 2 Recipients Selected.  Fill Date Product, Str, Form Qty Days Pt ID Prescriber Written RX# N/R* Pharm Pay MED+  ---------- -------------------------------- ------ ---- --------- ---------- ---------- ------------ ----- --------- ---------- ------  2017 SUBOXONE 8 MG-2 MG SL FILM 34.00 17 [id number] R [id number] 480.0  2017 SUBOXONE 8 MG-2 MG SL FILM 22.00 11 [id number] [id number] N [id number] 480.0  2017 SUBOXONE 8 MG-2 MG SL FILM 22.00 11 [id number] R [id number] 480.0  2017 SUBOXONE 8 MG-2 MG SL FILM 30.00 15 [id number] N [id number] 480.0  *N/R N=New R=Refill  +MED Daily  Prescribers for prescriptions listed  ----------------------------------------------------------------------------------------------------------------------------------  [id number] YUDITH GUEVARA MD; [address] [place], [place] [id number], [place] [address]  Pharmacies that dispensed prescriptions listed  ----------------------------------------------------------------------------------------------------------------------------------  [id number] [facility] : EL[place] # [id number], [address] [facility] [address],  Patients that match  search criteria  ----------------------------------------------------------------------------------------------------------------------------------  [id number] PAULETTE COLLAZO 50; [address] [place] [address]  [id number] PAULETTE COLLAZO 50;  [address] [place] [address]  MED Summary  This section displays cumulative MED values by unique recipient. The MED Max value is the maximum occurrence of cumulative MED  sustained for any 3 consecutive days. This value is calculated based on prescriptions dispensed during the date range requested.  -----------------------------------------------------------------------------------------------------------------------------------  [address]; 1950; [address] [place] [address]

## 2021-06-10 NOTE — TELEPHONE ENCOUNTER
CHIQUITA for patient and let him know Dr Brunner sent over a new script for trazodone 200 mg daily.

## 2021-06-10 NOTE — PROGRESS NOTES
________________________________________  Medications Phoned  to Pharmacy [] yes [x]no  Name of Pharmacist:  List Medications, including dose, quantity and instructions    Medications ordered this visit were e-scribed.  Verified by order class [x] yes  [] no  Suboxone 8-2 mg    Medication changes or discontinuations were communicated to patient's pharmacy: [] yes  [x] no    Dictation completed at time of chart check: [x] yes  [] no    I have checked the documentation for today s encounters and the above information has been reviewed and completed.

## 2021-06-10 NOTE — PROGRESS NOTES
Patient here today for follow up of medication management. States he has maintained sobriety. States he is not sure if his dreams have been better or not, since he is living alone now.  States depression 1/5 denies SI/HI, anxiety 0/5.     Health Information [place] Date: 05/10/17  [employer] Query Report Page#: 1  Patient Rx History Report  KULWINDER VARGHESE  Search Criteria: Last Name 'Kulwinder' and First Name 'yudith' and  =  and Request Period =   to '05/10/17' - 2 out of 2 Recipients Selected.  Fill Date Product, Str, Form Qty Days Pt ID Prescriber Written RX# N/R* Pharm Pay MED+  ---------- -------------------------------- ------ ---- --------- ---------- ---------- ------------ ----- --------- ---------- ------  2017 SUBOXONE 8 MG-2 MG SL FILM 20.00 10 [id number] R [id number] 480.0  2017 SUBOXONE 8 MG-2 MG SL FILM 14.00 7 [id number] N [id number] 480.0  2017 SUBOXONE 8 MG-2 MG SL FILM 34.00 17 [id number] R [id number] 480.0  2017 SUBOXONE 8 MG-2 MG SL FILM 22.00 11 [id number] [id number] N [id number] 480.0  2017 SUBOXONE 8 MG-2 MG SL FILM 22.00 11 [id number] [id number] R [id number] 480.0  2017 SUBOXONE 8 MG-2 MG SL FILM 30.00 15 [id number] [id number] N [id number] 480.0  *N/R N=New R=Refill  +MED Daily  Prescribers for prescriptions listed  ----------------------------------------------------------------------------------------------------------------------------------  [id number] YUDITH GUEVARA MD; [address] [place], [place] [id number], [place] [address]  Pharmacies that dispensed prescriptions listed  ----------------------------------------------------------------------------------------------------------------------------------  [id number] [facility] : EL[place]BIETA # [id number], [address] [facility] [address],  Patients that  match search criteria  ----------------------------------------------------------------------------------------------------------------------------------  [id number] PAULETTE COLLAZO 50; [address] [place] [address]  [id number] PAULETTE COLLAZO 50;  [address] [place] [address]  MED Summary  This section displays cumulative MED values by unique recipient. The MED Max value is the maximum occurrence of cumulative MED  sustained for any 3 consecutive days. This value is calculated based on prescriptions dispensed during the date range requested.  -----------------------------------------------------------------------------------------------------------------------------------  [address]; 1950; [address] [place] [address]

## 2021-06-10 NOTE — TELEPHONE ENCOUNTER
Refill refused, patient had refill 7/21/20, pharmacy was contacted, they acknowledged that it had been sent in error, patient has medication.

## 2021-06-10 NOTE — PROGRESS NOTES
This video/telephone visit will be conducted via a call between you and your physician/provider. We have found that certain health care needs can be provided without the need for an in-person physical exam. This service lets us provide the care you need with a video /telephone conversation. If a prescription is necessary we can send it directly to your pharmacy. If lab work is needed we can place an order for that and you can then stop by our lab to have the test done at a later time.    Just as we bill insurance for in-person visits, we also bill insurance for video/telephone visits. If you have questions about your insurance coverage, we recommend that you speak with your insurance company.    Patient has given verbal consent for video/Telephone visit? Yes  Patient would like telephone visit, please call:  [phone]  [facility]/ALICE EDMONDS CMA    Patient verified allergies, medications and pharmacy via phone. PHQ: 5 and JERZY: 6 done verbally with writer. Patient states he is ready for visit.    Unable to review [place] , awaiting access from provider.

## 2021-06-10 NOTE — TELEPHONE ENCOUNTER
Pt calling in regards to his medication, traZODone (DESYREL) 150 MG tablet and advising that he was supposed to get 200 mg tablets and he did not. He advised that Brunner agreed to change it and he has not gotten it. He would like to connect with you in regards to this discrepancy or if you can just send it to [facility].

## 2021-06-11 NOTE — PROGRESS NOTES
"Addiction  Nurse Only Visit Note    6/7/2017  Date of last visit: 5/10/17    Reason for today's visit:   Chief Complaint   Patient presents with     Follow-up       Vitals:    06/07/17 0939   Weight: 180 lb (81.6 kg)   Height: 5' 9\" (1.753 m)        If having pain, who will address pain:  No    Is pt assisted: No    Urine for toxicology sent today: yes    Last UA date: 5/10/17  Reviewed with patient: yes  Results: negatie    AIMS:     Pill count: No  (Controlled substance only)  Medications needing renewal: See meds and orders    Substance use history:    Using alcohol:No  Using street drugs or unprescribed medications: No  Using opioids other that Suboxone:No  Using tobacco:Yes: Describe: 1/5 pack of cigarettes a day    Recovery environment:  Attending formal chemical dependency programming: No  Attending \"outside\" mutual help meetings: Yes: Describe: NA meetings once a week  Has a chemical dependency sponsor: No  Has other elements of structure: No  Current Living Situation: living alone    Cravings: no    Sleep: no    Depression: yes 1/10 - related to [facility]'s failing health    Anxiety: yes seeing some increased anxiety in the afternoon, but it is manageable    Panic Attacks: no    Paranoia: no      Hallucinations: no      Suicidal Ideation: no    Homicidal Ideation:no  Comments: none    Physical Problems: Osteoarthritis, Left shoulder tendinitis - pain managed well with Suboxone    Patient Update: Patient arrived for Nurse Only appointment on-time and well groomed. He said the Suboxone is working well, no cravings. Patient attending NA once weekly. Patient stated his \"life is busy\". 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 Patient endorsed low depression and anxiety, but stated they both were manageable at this time. He said he has let go of a lot of anger towards his former wife's family. He said acknowledged that he has \"a short fuse\", but " said he continues to work on his anger issues. He denied any SI or HI. No concerns for Dr. Huston or Dr. Curiel today.     Per Dr. Huston's directive, phoned in Suboxone 8-2 mg SL Film. Place 1 film under the tongue 2 times day. #64 Refill 0. Spoke with pharmacist Maximiliano at [facility]. Order read back for accuracy.  Patient has enough film currently to cover him through 6/10/17. This order will cover patient from 6/11/7 to 7/12/17.    Next appointment with Dr. Curiel on 7/12/17.    [place]  reviewed prior to Nurse Only appointment and was embedded in this encounter.         Education Done Today  Patient Instructions:   Patient Instructions   Continue Medications as ordered.  No alcohol or unprescribed drug use.  No driving, if sedated.  Come to the Emergency Room if not feeling safe.  Call the clinic with any questions [phone].  You can also contact Urgent Care Crisis Line at [phone] 24 hours a day for help.  Follow up as directed, for your appointments, per your After Visit Summary Form.            David J Myhre    6/7/2017 9:51 AM
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
## 2021-06-11 NOTE — TELEPHONE ENCOUNTER
Date of Last Office Visit: 8/18/2020  Date of Next Office Visit: none (central scheduling to connect with patient)  No shows since last visit: none  Cancellations since last visit: none  ED visits since last visit:  None after his last appt.    Medication gabapentin 600 mg  date last ordered: 7/21/2020  Qty: 120  Refills: 1    Lapse in therapy greater than 7 days: no  Medication refill request verified as identical to current order: yes  Result of Last DAM, VPA, Li+ Level, CBC, or Carbamazepine Level (at or since last visit): n/a     [] Medication refilled per [facility] M-1.   [x] Medication unable to be refilled by RN due to criteria not met as indicated below:     []Eligibility - not seen in last year    []Supervision - no future appointment    []Compliance     []Verification - order discrepancy    []Controlled Medication    [x]Medication not included in RN Protocol    []90 - day supply request    []Other   Current Medication list: Rishi Coombsshaneal    (MRN [id number])   Your Current Medications Are     aspirin 81 MG EC tablet  Take 81 mg by mouth daily.    buprenorphine-naloxone (SUBOXONE SL TABLET) 8-2 mg Subl per sublingual tab  1 tablet three times a day    buPROPion (WELLBUTRIN SR) 150 MG 12 hr tablet  TAKE 2 TABLETS(300 MG) BY MOUTH EVERY MORNING. DO NOT CRUSH    calcium, as carbonate, (TUMS) 200 mg calcium (500 mg) chewable tablet  Chew 1 tablet daily as needed for heartburn.    furosemide (LASIX) 20 MG tablet  Take 20 mg by mouth daily as needed.    gabapentin (NEURONTIN) 600 MG tablet  Take 1 tablet (600 mg total) by mouth 4 (four) times a day.    traZODone (DESYREL) 100 MG tablet  Take 2 tablets (200 mg total) by mouth at bedtime as needed for sleep. TAKE 1 TABLET BY MOUTH AS NEEDED FOR SLEEP.    venlafaxine (EFFEXOR-XR) 150 MG 24 hr capsule  TAKE 1 CAPSULE(150 MG) BY MOUTH DAILY        Medication Plan of Care at last office visit with MD/CNP:  Plan:  1. Patient to continue on buprenorphine-naloxone 8/2 mg  SL tablet three times a day, and a 30-day supply was given. Reiterated concern for patients  2. I referred Rishi to obtain an MMPI, to assess any history of bipolar disorder or other underlying diagnosis. He will continue on effexor and gabapentin at this time for major depressive disorder.   3. Patient declines offer to start alcohol anticraving medications but reports he is open to attending treatment and moving into sober housing.  4. Patient also requested that I speak with his son, and I directed him to call and complete a release of information.  5. Patient urine toxicology to be deferred given the coronavirus pandemic.   6. Patient to rtc in 1 month and sooner prn.

## 2021-06-12 NOTE — PROGRESS NOTES
"Addiction  Nurse Only Visit Note    8/9/2017  Date of last visit: 7/13/17    Reason for today's visit:   Chief Complaint   Patient presents with     Addiction     nurse only for Dr. Curiel       There were no vitals filed for this visit.     If having pain, who will address pain:  NA    Is pt assisted: No    Urine for toxicology sent today: yes    Last UA date: 7/13/17  Reviewed with patient: yes  Results: negative  BUP: 6/7/17 was positive, next due 9/2017      Pill count: did not bring his film to clinic for counting, reports he has only one left    (Controlled substance only)  Medications needing renewal: Suboxone 8-2mg film BID #30 no refills called to the pharmacy spoke with Pam    Substance use history:    Using alcohol:No  Using street drugs or unprescribed medications: No  Using opioids other that Suboxone:No  Using tobacco:Yes: Describe: one pack of cigarettes per day, down from 2 packs a day using the e-cigarette    Recovery environment:  Attending formal chemical dependency programming: No  Attending \"outside\" mutual help meetings: Yes: Describe: every two weeks  Has a chemical dependency sponsor: No  Has other elements of structure: Yes: Describe: works 3 part time jobs, which  = 50-60 hours per week  Current Living Situation: apt. 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                      Next appointment is 9/6 with Dr Curiel                      Pt. Gets his suboxone in 2 week intervals, his co-pay is " $170 per script.                      Cannot tell if his prazosin is working..takes the medication after he has a nightmare, and for a few nights after.    Physical Problems: no        Education Done Today  Patient Instructions:   Patient Instructions   Continue Medications as ordered.  No alcohol or unprescribed drug use.  No driving, if sedated.  Come to the Emergency Room if not feeling safe.  Call the clinic with any questions [phone].  You can also contact Urgent Care Crisis Line at [phone] 24 hours a day for help.  Follow up as directed, for your appointments, per your After Visit Summary Form.      Stacey Holt    2017 9:37 AM    Date: 17  Query Report Page#: 1  Patient Rx History Report  KULWINDER VARGHESE  Search Criteria: Last Name 'Kulwinder' and First Name 'yudith' and  = ' and Request Period = '  to ' - 3 out of 3 Recipients Selected.  Fill Date Product, Str, Form Qty Days Pt ID Prescriber Written RX# N/R* Pharm **MED+  ---------- -------------------------------- ------ ---- --------- ---------- ---------- ------------ ----- --------- ------  2017 SUBOXONE 8 MG-2 MG SL FILM 30.00 15 [id number] 2017 [id number] N [id number] 480.0  2017 SUBOXONE 8 MG-2 MG SL FILM 30.00 15 [id number] 05/10/2017 [id number] R [id number] 480.0  2017 GABAPENTIN 100 MG CAPSULE 120.00 30 [id number] 2017 [id number] N [id number] 00.0  2017 SUBOXONE 8 MG-2 MG SL FILM 32.00 16 [id number] 2017 [id number] R [id number] 480.0  2017 SUBOXONE 8 MG-2 MG SL FILM 32.00 16 [id number] 2017 [id number] N [id number] 480.0  2017 GABAPENTIN 100 MG CAPSULE 120.00 30 [id number] 2017 [id number] N [id number] 00.0  2017 SUBOXONE 8 MG-2 MG SL FILM 30.00 15 [id number] 05/10/2017 [id number] N [id number] 480.0  05/15/2017 SUBOXONE 8 MG-2 MG SL FILM 30.00 15 [id number] 05/10/2017 [id number] N [id number]  480.0  *N/R N=New R=Refill  +MED Daily  Prescribers for prescriptions listed  ----------------------------------------------------------------------------------------------------------------------------------  [id number] HALIMA GUEVARA MD; [address] [place], [place] [id number], [place] [address]  [id number] WILLIAM LITTLE MD; [employer], [address] [place] [address],[facility] [address]  Pharmacies that dispensed prescriptions listed  ----------------------------------------------------------------------------------------------------------------------------------  [id number] [facility] : [facility] # [id number], [address] [facility] [address],  [id number] [facility] : [facility] # [id number], [address] [facility] [address],  [id number] [facility].; : [facility] # [id number], [address] [facility] [address],  Patients that match search criteria  ----------------------------------------------------------------------------------------------------------------------------------  [id number] HAYDEN VARGHESE,  50; [address], [place] [address]  [id number] HAYDEN VARGHESE [facility] 50; [address], [place] [address]  [id number] HAYDEN [facility] 50; [address], [place] [address]  MED Summary  This section displays cumulative MED values by unique recipient. The MED Max value is the maximum occurrence of cumulative MED  sustained for any 3 consecutive days. This value is calculated based on prescriptions dispensed during the date range requested.  ---------------------------------------------------------------------------------

## 2021-06-12 NOTE — TELEPHONE ENCOUNTER
Disregard previous message, Patient is being followed at [facility].  He will need to call them for his medication.  DO NOT SCHEDULE WITH DR. WHYTE.

## 2021-06-12 NOTE — PROGRESS NOTES
Correct pharmacy verified with patient and confirmed in snapshot? [x] yes []no    Charge captured ? [] yes  [x] no    Medications Phoned  to Pharmacy [] yes [x]no  Name of Pharmacist:  List Medications, including dose, quantity and instructions      Medication Prescriptions given to patient   [] yes  [x] no   List the name of the drug the prescription was written for.       Medications ordered this visit were e-scribed.  Verified by order class [x] yes  [] no  Trazodone 100 mg  Gabapentin 100 mg  Suboxone 8-2 mg    Medication changes or discontinuations were communicated to patient's pharmacy: [] yes  [x] no    UA collected [] yes  [x] no    [place] Prescription Monitoring Program Reviewed? [] yes  [x] no    Referrals were made to: none     Future appointment was made: [x] yes  [] no    Dictation completed at time of chart check: [x] yes  [] no    I have checked the documentation for today s encounters and the above information has been reviewed and completed.

## 2021-06-12 NOTE — TELEPHONE ENCOUNTER
Patient called back, he is concerned that his assement will not be sent to the other option for him to go to treatment :  [facility] Fax [phone]    Let him know Deja is still working on this and that he we would not be scheduling with Dr Brunner at this time per note , Patient is agreeable, did state that the [facility] was holding a bed for him until they get the assessment

## 2021-06-12 NOTE — PROGRESS NOTES
Patient here today for follow up of medication management. States he has maintained sobriety except THC a few weeks back. States taking 6-7 Gabapentin a day. Denies depression     [place] Date: 17  Query Report Page#: 1  Patient Rx History Report  KULWINDER VARGHESE  Search Criteria: Last Name 'Kulwinder' and First Name 'yudith' and  =  and Request Period =   to ' - 3 out of 3 Recipients Selected.  Fill Date Product, Str, Form Qty Days Pt ID Prescriber Written RX# N/R* Pharm **MED+  ---------- -------------------------------- ------ ---- --------- ---------- ---------- ------------ ----- --------- ------  2017 SUBOXONE 8 MG-2 MG SL FILM 30.00 15 [id number] 08/10/2017 [id number] N [id number] 480.0  08/10/2017 GABAPENTIN 100 MG CAPSULE 240.00 40 [id number] N [id number] 00.0  2017 SUBOXONE 8 MG-2 MG SL FILM 30.00 15 [id number] N [id number] 480.0  2017 SUBOXONE 8 MG-2 MG SL FILM 30.00 15 [id number] 2017 [id number] N [id number] 480.0  2017 SUBOXONE 8 MG-2 MG SL FILM 30.00 15 [id number] 05/10/2017 [id number] R [id number] 480.0  2017 GABAPENTIN 100 MG CAPSULE 120.00 30 [id number] 2017 [id number] N [id number] 00.0  2017 SUBOXONE 8 MG-2 MG SL FILM 32.00 16 [id number] R [id number] 480.0  2017 SUBOXONE 8 MG-2 MG SL FILM 32.00 16 [id number] N [id number] 480.0  *N/R N=New R=Refill  +MED Daily  Prescribers for prescriptions listed  ----------------------------------------------------------------------------------------------------------------------------------  [id number] WILLIAM LITTLE MD; [facility], [address] [place] [address]  [id number] YUDITH GUEVARA MD; [address], [place] [address]  Pharmacies that dispensed prescriptions  listed  ----------------------------------------------------------------------------------------------------------------------------------  [id number] [employer] : [facility] # [id number], [address] [facility] [address],  [id number] [employer] [facility].; : [facility] # [id number], [address] [facility] [address],  Patients that match search criteria  ----------------------------------------------------------------------------------------------------------------------------------  [id number] HAYDEN VARGHESE [facility] 50; [address] [place] [address]  [id number] HAYDEN VARGHESE [facility] 50; [address], [place] [address]  [id number] [facility]DEN [facility] 50; [address], [place] [address]  MED Summary  This section displays cumulative MED values by unique recipient. The MED Max value is the maximum occurrence of cumulative MED  sustained for any 3 consecutive days. This value is calculated based on prescriptions dispensed during the date range requested.  -----------------------------------------------------------------------------------------------------------------------------------  960 HALIMA BLAKE; 1950; [address], [place] [address]

## 2021-06-12 NOTE — PROGRESS NOTES
"Individual Phone Session    Rishi Miramontes is a 70 y.o. male who is being evaluated via telephone visit.      The patient has been notified of the following:      \"We have found that certain health care needs can be provided without the need for a face to face visit.  This service lets us provide the care you need with a phone conversation. I will have full access to your [facility] medical record during this entire phone call. I will be taking notes for your medical record. Since this is like an office visit, we will bill your insurance company for this service. There are potential benefits and risks of telephone visits (e.g. limits to patient confidentiality) that differ from in-person visits.?  Confidentiality still applies for telephone services, and nobody will record the visit.  It is important to be in a quiet, private space that is free of distractions (including cell phone or other devices) during the visit.?If during the course of the call I believe a telephone visit is not appropriate, you will not be charged for this service\"    Counselor and patient spoke via phone for 84 minutes for chemical health evaluation.     Call Notes: Counselor contacted patient for chemical health evaluationduring suspension of in person services.     Provider location: [facility]   Patient location: Home  Mode of Transmission: Telephone    Call Started at: 9:00 AM  Call Ended at: 10:24 AM    Patient's engagement in the telephone visit: high     Supervising MD: Dr. Andrew Werner, [facility]  10/16/2020, 9:02 AM      "

## 2021-06-12 NOTE — TELEPHONE ENCOUNTER
Per Sho MTZ, this patient is ok'd to schedule a new patient telephone visit with Dr. Morales, tomorrow, 10/20/20, in the AM for 1 hour.

## 2021-06-13 NOTE — PROGRESS NOTES
Rishi Miramontes attended 3 hours of group today.     The group topic was Relationships/Boundaries, patient was responsive to topic.     Patient's engagement in the group session: medium     Total group size: 7      YAW Hwang, [facility]  12/7/2020, 1:35 PM

## 2021-06-13 NOTE — PROGRESS NOTES
Rishi Miramontes attended 3 hours of group today.     The group topic was Grief and Loss, patient was responsive to topic.     Patient's engagement in the group session: medium     Total group size: 4      YAW Hwang, [facility]  12/16/2020, 2:17 PM

## 2021-06-13 NOTE — PROGRESS NOTES
Rishi Miramontes attended 3 hours of group today.     The group topic was Strengths, patient was responsive to topic.     Patient's engagement in the group session: medium     Total group size: 8      YAW Hwang, [facility]  12/21/2020, 12:15 PM

## 2021-06-13 NOTE — PROGRESS NOTES
Rishi Miramontes attended 2 hours of group on 12/14/2020.     The group topic was Grief and Loss, patient was responsive to topic.     Patient's engagement in the group session: medium     Total number of patients present 8.     Counselor(s) present: JOYCE Mendez    Supervising MD: JOYCE Lincoln  12/14/2020, 12:39 PM

## 2021-06-13 NOTE — PROGRESS NOTES
Telemedicine Visit: The patient's condition can be safely assessed and treated via synchronous audio and visual telemedicine encounter.      Reason for Telemedicine Visit: Services only offered telehealth    Originating Site (Patient Location): Patient's home    Distant Site (Provider Location): Provider Remote Setting- Home Office    Consent:  The patient/guardian has verbally consented to: the potential risks and benefits of telemedicine (video visit) versus in person care; bill my insurance or make self-payment for services provided; and responsibility for payment of non-covered services.     Mode of Communication:  Video Conference via Zoom    Call Started at: 9:30 AM  Call Ended at: 10:45 AM    As the provider I attest to compliance with applicable laws and regulations related to telemedicine.

## 2021-06-13 NOTE — PROGRESS NOTES
Telemedicine Visit: The patient's condition can be safely assessed and treated via synchronous audio and visual telemedicine encounter.      Reason for Telemedicine Visit: Services only offered telehealth    Originating Site (Patient Location): Patient's home    Distant Site (Provider Location): [facility]: [facility]    Consent:  The patient/guardian has verbally consented to: the potential risks and benefits of telemedicine (video visit) versus in person care; bill my insurance or make self-payment for services provided; and responsibility for payment of non-covered services.     Mode of Communication:  Video Conference via Zoom    Call Started at: 09:30 AM  Call Ended at: 12:02 PM    As the provider I attest to compliance with applicable laws and regulations related to telemedicine.

## 2021-06-13 NOTE — PROGRESS NOTES
"Outpatient Substance Use Disorder Treatment - Initial Services Plan     Patient  Name: Rishi Miramontes  MRN: [id number]   : 1950  Admit Date: 2020        Patient describes their immediate need: To learn recovery skills to prevent relapse.     Are there any immediate Safety Needs such as (physical, stability, mobility):  Pt is able to get medical care as needed. Pt denies immediate concerns.     Immediate Health Needs and Plan:   None    Vulnerable Adult: No     Issues to be addressed in the first sessions:   Orientation to program.  Introduction to peers.    Patient strengths and needs:   Strengths identified as \"Persaverence. Committment. Integerity. Honestly.\"  Needs identified as \"Without a doubt, number one, I think I am bi-polar. Not finishing things. \"    Plan for patient for time between intake and completion of the treatment plan:   Attend all group therapy sessions as directed, complete all written and oral assignments as directed, and remain clean and sober. A relapse, if any, must be reported to staff immediately in order to ensure you are receiving the proper level of care. If you cannot attend a group therapy session you must call contact information provided in intake folder and leave a message before or during group hours.       Vulnerable Adult Review   [X] Review of the Facility Abuse Prevention plan was reviewed with the patient   [X] No Individual Abuse Plan is necessary   [ ] In addition to the Facility Abuse Prevention plan, an Individual Abuse Plan will be put in place       Staff Name/Title: JOYCE Tilley  Date: 2020  Time: 1:40 PM          "

## 2021-06-13 NOTE — PROGRESS NOTES
Telemedicine Visit: The patient's condition can be safely assessed and treated via synchronous audio and visual telemedicine encounter.      Reason for Telemedicine Visit: Services only offered telehealth    Originating Site (Patient Location): Patient's home    Distant Site (Provider Location): Provider Remote Setting- Home Office    Consent:  The patient/guardian has verbally consented to: the potential risks and benefits of telemedicine (video visit) versus in person care; bill my insurance or make self-payment for services provided; and responsibility for payment of non-covered services.     Mode of Communication:  Video Conference via Zoom    Call Started at: 9:30 AM  Call Ended at: 12:10 PM    As the provider I attest to compliance with applicable laws and regulations related to telemedicine.
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
## 2021-06-13 NOTE — PROGRESS NOTES
Rishi Miramontes attended 3 hours of group on 12/18/2020.     The group topic was Grief and Loss, patient was responsive to topic.     Patient's engagement in the group session: medium     Total number of patients present 7.     Counselor(s) present: JOYCE Mendez    Supervising MD: JOYCE Lincoln  12/18/2020, 12:26 PM

## 2021-06-13 NOTE — PROGRESS NOTES
Rishi Miramontes attended 3 hours of group today.     The group topic was Relationships/Boundaries, patient was responsive to topic.     Patient's engagement in the group session: medium     Total group size: 7      YAW Hwang, [facility]  12/9/2020, 12:23 PM

## 2021-06-13 NOTE — PROGRESS NOTES
"Substance Use Disorder Outpatient Treatment  Intake Note:     D) Rishi Miramontes is a 70 y.o.   White or  male who is referred to [facility] Co-occurring [facility] Outpatient Treatment via Self with funding from Medicare. Patient orientated x 3. Patient meets criteria for Alcohol Use Disorder, Severe, (F10.20) (303.90).  Patient appears appropriate for [facility] Co-occurring ZACK Outpatient Treatment.   A) Met with patient for 35 minutes on 11/18/2020.  Completed intake assessment and preliminary paperwork. Patient was given and explained counselor & supervisor license number and contact info, Patient Bill of Rights, program rules/regulations, Program Abuse Prevention Plan, confidentiality & HIPPA policies, grievance procedure, presented ROIs, TB & HIV/AIDS policies & resources, and Vulnerable Adult policy.   Conducted Vulnerable Adult Assessment.   R)No special Vulnerable Adult needed at this time.  Patient signed and agreed to counselor & supervisor license number and contact info, Patient Bill of Rights, group rules/regulations, Program Abuse Prevention Plan, confidentiality & HIPPA policies, grievance procedure,  ROIs, TB & HIV/AIDS policies & resources, and Vulnerable Adult policy. Patient scored low risk on C-SSRS screen. Patient denied suicidal ideation/intent/plan/means at this time.     Opioid Use Disorder: No   Provided \"Options for Opioid Treatment in [place] and Overdose Prevention\" NA     Dimension #1 - Withdrawal Potential - Risk 1. Patient reports last use of alcohol on 11/07/2020. Patient denies withdrawal symptoms at this time. Patient is on MAT-Suboxone. Patient likely to experience withdrawal symptoms if aburptly discontinued.    Dimension #2 - Biomedical - Risk 1. Patient reports physical health is currently stable. Patient has Medicare insurance. Patient has primary care provider. Patient is able to seek cares as needed.    Dimension #3 - Emotional , Behavioral and Cognitive - " Risk 2. Patient reports Depression, Anxiety, PTSD, ADD. Patient has mental health care provider. Patient reports recently experiencing mental health symptoms. Patient reports  History of abuse and trauma. Patient denies suicidal ideation.     Dimension #4 - Readiness for Change - Risk 1. Patient verbalizes a desire for change.    Dimension #5 - Relapse Potential - Risk 4. Patient lacks healthy , positive coping skills. Patient lacks skills to prevent relapse. Patient lacks insight to personal relapse process. Patient may not fully recognize impact substance use has on physical and/or mental health. Patient lacks periods of sobriety. Patient reports prior treatment episodes. Patient is high risk for continued use/relapse.    Dimension #6 - Recovery Environment - Risk 3. Patient is unemployed. Patient has stable housing. Patient is not engaged in structured daily activities. Patient reports limited positive support system. Patient denies past or current legals.    T) Explained counselor & supervisor license number and contact info, Patient Bill of Rights, program rules/regulations, Program Abuse Prevention Plan, confidentiality & HIPPA policies, grievance procedure, presented ROIs, TB & HIV/AIDS policies & resources, and Vulnerable Adult policy. Patient expected to start group TBD. DA scheduled for 11/19/2020.      JOYCE Tilley  11/18/2020, 1:40 PM

## 2021-06-13 NOTE — PROGRESS NOTES
Telemedicine Visit: The patient's condition can be safely assessed and treated via synchronous audio and visual telemedicine encounter.      Reason for Telemedicine Visit: Services only offered telehealth    Originating Site (Patient Location): Patient's home    Distant Site (Provider Location): [facility]: [facility]    Consent:  The patient/guardian has verbally consented to: the potential risks and benefits of telemedicine (video visit) versus in person care; bill my insurance or make self-payment for services provided; and responsibility for payment of non-covered services.     Mode of Communication:  Video Conference via Zoom    Call Started at: 09:30 AM  Call Ended at: 12:05 PM    As the provider I attest to compliance with applicable laws and regulations related to telemedicine.

## 2021-06-13 NOTE — PROGRESS NOTES
Telemedicine Visit: The patient's condition can be safely assessed and treated via synchronous audio and visual telemedicine encounter.      Reason for Telemedicine Visit: Services only offered telehealth    Originating Site (Patient Location): Patient's home    Distant Site (Provider Location): Provider Remote Setting- Home Office    Consent:  The patient/guardian has verbally consented to: the potential risks and benefits of telemedicine (video visit) versus in person care; bill my insurance or make self-payment for services provided; and responsibility for payment of non-covered services.     Mode of Communication:  Video Conference via Zoom    Call Started at: 1:30 PM  Call Ended at: 2:05 PM    As the provider I attest to compliance with applicable laws and regulations related to telemedicine.

## 2021-06-13 NOTE — PROGRESS NOTES
Rishi Miramontes attended 3 hours of group today.     The group topic was Communication Skills, patient was responsive to topic.     Patient's engagement in the group session: medium     Total group size: 5      YAW Hwang, [facility]  11/25/2020, 12:35 PM

## 2021-06-13 NOTE — PROGRESS NOTES
Weekly Progress Note    Week of 12/7/20-12/13/20  Rishi Miramontes  1950  [id number]      D) Pt attended 3 groups this week with 0 absences. Patient attended 0 individual sessions this week. A) Staff facilitated groups and reviewed tx progress. Assessed for [facility]. R) No VAP needed at this time.     Any significant events, defines as events that impact patients relationship with others inside and outside of treatment No  Indicate any changes or monitoring of physical or mental health problems No    Indicate involvement by any outside supports No. Pt declines attending support groups or individual psychotherapy at this time.    IAPP reviewed and modified as needed. NA  Pt working on the following dimensions:    Dimension #1 - Withdrawal Potential - Risk 1. Pt stable on Suboxone. He reports consuming approximately 2 oz liquor on 11/25 and denies significant intoxication or any withdrawal.   Specific goals from treatment plan addressed this week:  Patient to maintain abstinence throughout outpatient treatment.   Effectiveness of strategies:  Strategies appear to be effective.     Dimension #2 - Biomedical - Risk 1. No current concerns.   Specific goals from treatment plan addressed this week:  Patient to maintain stable health throughout outpatient treatment.   Effectiveness of strategies:  Strategies appear to be effective.    Dimension #3 - Emotional/Behavioral/Cognitive - Risk 2. Rishi reports continuing anxiety and seeming anger towards his former wife. He appears to have difficulty staying present with uncomfortable emotions. He appears to be somewhat isolated. He notes interviewing for jobs which makes him feel better due to the potential structure and extra income.   Active interventions to stabilize mental health symptoms:  Rishi reports playing music daily.  Specific goals from treatment plan addressed this week:  Establish and maintain mental and emotional health.  Effectiveness of strategies:  Strategies  appear to be questionable.    Dimension #4 - Treatment Acceptance/Resistance - Risk 2. Rishi has actively participated in treatment groups. He has expressed ambiguity around engaging individual psychotherapy or participating in recovery support group meetings. He has reportedly applied for some jobs.  Specific goals from treatment plan addressed this week:  Patient to increase motivation towards recovery by participating in outpatient programming.   Effectiveness of strategies:  Strategies appear to be effective.    Dimension #5 - Relapse Potential - Risk 3.Rishi denies any use over the past week though notes some preoccupation. He appears to have limited interventions and presents as somewhat isolated. He is declining to engage peer recovery support at this time. He appears to be somewhat isolated and has expressed resistance to engaging a larger recovery community at this time.   Specific goals from treatment plan addressed this week:  Develop and utilize practical, effective relapse-prevention strategies and tools.   Effectiveness of strategies:  Strategies appear to be questionable.    Dimension #6 - Recovery Environment - Risk 3. Rishi presents as somewhat isolated. He spent Thanksgiving alone. He has been provided resources for non-AA recovery support groups though is declining to engage at this time. He has declined a referral for individual psychotherapy at this time. He has reportedly applied for two jobs.    Specific goals from treatment plan addressed this week:  Establish and engage a widespread, redundant recovery support network.   Effectiveness of strategies:  Strategies appear to be questionable.    T) Treatment plan updated No.  Patient notified and in agreement NA    Patient educated on Relationships. Patient has completed 24 of 115 program hours at this time.     Projected discharge date is 5/18/21. Current discharge plan is PENDING.     Ancelmo Zaidi, MALINI, [facility], [facility]  12/12/2020, 1:38  PM      Weekly Educational Topics Date   1. Understanding Dual Diagnoses, week 1    2. Understanding Dual Diagnoses, week 2    3. Stress Management    4. Feelings/Emotions    5. Thinking    6. Building Recovery Support    7. Developing Assertive Communication Skills 11/23/20; 11/25/20;    8. Relapse Prevention 11/30/20; 12/2/20; 12/4/20;    9. Relationships/Boundaries 12/7/20; 12/9/20; 12/11/20;   10. Grief and Loss    11. Strengths    12. Wellness

## 2021-06-13 NOTE — PROGRESS NOTES
"Rishi Littlemelissaneal attended 3 hours of group today.     The group topic was Relapse Prevention, patient was responsive to topic.     Patient's engagement in the group session: medium     Total group size: 5    Rishi reports consuming approximately 2 oz of liquor on 11/25, ostensibly to \"celebrate\" the holiday. He noted feeling guilt and pouring out the rest.       Ancelmo Zaidi [facility], [facility]  11/30/2020, 1:09 PM  "

## 2021-06-13 NOTE — PROGRESS NOTES
Rishi Miramontes attended 3 hours of group on 12/11/2020.     The group topic was Relationships/Boundaries, patient was responsive to topic.     Patient's engagement in the group session: medium     Total number of patients present 8.     Counselor(s) present: JOYCE Mendez    Supervising MD: JOYCE Lincoln  12/11/2020, 12:43 PM

## 2021-06-13 NOTE — PROGRESS NOTES
Addiction Outpatient Weekly Clinical Staffing      Name: Rishi Miramontes  MRN: [id number]    Diagnosis: Alcohol Use Disorder, severe (F10.20)    Admit Date: 11/18/20    Program:[facility] Co-occurring ZACK Outpatient Treatment   Date: 11/25/2020     Present:  JOYCE Rodriguez, YAW Hwang LADC, JOYCE Velez, JOYCE Tilley, VICTOR HUGO Mendez and VICTOR HUGO Squires     Dimension One:  1   Dimension Two:  1   Dimension Three:  2     Dimension Four:  2    Dimension Five: 3   Dimension Six:  2     Specific Treatment Plan Methods and Goals:  Continue medication management with Dr. Myers; Continue job search; Increase recovery support engagement;       Specific Identified Strengths and/or Barriers:  Some background with recovery; Highly motivated; Considerable isolation;      Developed Plan:  Engage recovery support; Consider referral for individual psychotherapy; Begin PT job for structure and income;     Date: 12/1/2020 Time: 10:02 AM    Staff Signature: YAW Hwang LADC

## 2021-06-13 NOTE — PROGRESS NOTES
Rishi Miramontes attended 3 hours of group today.     The group topic was Communication Skills, patient was responsive to topic.     Patient's engagement in the group session: medium     Total group size: 5    Pt attended his first MICD IOP group on this date.     Ancelmo Zaidi [facility], [facility]  11/23/2020, 1:05 PM

## 2021-06-13 NOTE — PROGRESS NOTES
Rishi Miramontes attended 3 hours of group today.     The group topic was Relapse Prevention, patient was responsive to topic.     Patient's engagement in the group session: medium     Total group size: 6      YAW Hwang, [facility]  12/2/2020, 12:23 PM

## 2021-06-13 NOTE — PROGRESS NOTES
Telemedicine Visit: The patient's condition can be safely assessed and treated via synchronous audio and visual telemedicine encounter.      Reason for Telemedicine Visit: Services only offered telehealth    Originating Site (Patient Location): Patient's home    Distant Site (Provider Location): [facility]: [facility]    Consent:  The patient/guardian has verbally consented to: the potential risks and benefits of telemedicine (video visit) versus in person care; bill my insurance or make self-payment for services provided; and responsibility for payment of non-covered services.     Mode of Communication:  Video Conference via Zoom    Call Started at: 09:30 AM  Call Ended at: 12:05 PM    As the provider I attest to compliance with applicable laws and regulations related to telemedicine.

## 2021-06-13 NOTE — PROGRESS NOTES
Rishi Miramontes attended 3 hours of group on 12/04/2020.     The group topic was Relapse Prevention. patient was responsive to topic.     Patient's engagement in the group session: medium     Total number of patients present 5.     Counselor(s) present: JOYCE Mendez    Supervising MD: JOYCE Lincoln  12/4/2020, 12:24 PM

## 2021-06-13 NOTE — PROGRESS NOTES
Weekly Progress Note  Rishi Miramontes  1950  [id number]      D) Pt attended 0 groups this week with 0 absences. Patient attended 0 individual sessions this week. A) Staff facilitated groups and reviewed tx progress. [facility] for [facility]. R) No VAP needed at this time.     Any significant events, defines as events that impact patients relationship with others inside and outside of treatment: No  Indicate any changes or monitoring of physical or mental health problems: No  Indicate involvement by any outside supports: No  IAPP reviewed and modified as needed: NA    Pt working on the following dimensions:  Dimension #1 - Withdrawal Potential - Risk 1. Pt continues on stable dose of Suboxone. No other concerns.  Specific goals from treatment plan addressed this week:  Patient has not yet attended group, intake completed on 11/18/20.   Effectiveness of strategies:  Patient signed treatment plan on 11/19/20 and is in agreement.   Dimension #2 - Biomedical - Risk 1. No current concerns.  Specific goals from treatment plan addressed this week:  Patient has not yet attended group, intake completed on 11/18/20.   Effectiveness of strategies:  Patient signed treatment plan on 11/19/20 and is in agreement. .   Dimension #3 - Emotional/Behavioral/Cognitive - Risk 3. Pt appears to meet criteria for Attention Deficit Hyperactivity Disorder. He reports general stability in mental health though presented as somewhat labile during DA on 11/19.   Active interventions to stabilize mental health symptoms:  Pt reports daily self-care through playing and writing music.   Specific goals from treatment plan addressed this week:  Patient has not yet attended group, intake completed on 11/18/20.   Effectiveness of strategies:  Patient signed treatment plan on 11/19/20 and is in agreement.   Dimension #4 - Readiness for Change - Risk 1. No current concerns. Some ambiguity around intersection of mental health and substance use  disorder.  Specific goals from treatment plan addressed this week:  Patient has not yet attended group, intake completed on 11/18/20.   Effectiveness of strategies:  Patient signed treatment plan on 11/19/20 and is in agreement.   Dimension #5 - Relapse Potential - Risk 3. Pt reports recent chemical use. It remains unclear to what degree he understands alcoholism, including underlying recidivism factors.   Specific goals from treatment plan addressed this week:  Patient has not yet attended group, intake completed on 11/18/20;.   Effectiveness of strategies:  Patient signed treatment plan on 11/19/20 and is in agreement.    Dimension #6 - Recovery Environment - Risk 3. Pt presents as somewhat isolated. He has agreed to re-engage recovery support groups and has been provided resources.   Specific goals from treatment plan addressed this week:  Patient has not yet attended group, intake completed on 11/18/20.   Effectiveness of strategies:  Patient signed treatment plan on 11/19/20 and is in agreement.    T) Treatment plan updated: yes.  Patient notified and in agreement: Yes.  Patient educated on NA. Patient has completed 0 of 115 program hours at this time. Projected discharge date is 5/. Current discharge plan is 5/4/21.     Ancelmo Zaidi [facility]  11/20/2020, 2:06 PM       Weekly Educational Topics Date   1. Dual Diagnoses, week 1    2. Dual Diagnoses, week 2    3. Stress Management    4. Feelings/Emotions    5. Thinking    6. Change    7. Recovery Support    8. Relationships/Communication    9. Addiction 101    10. Relapse Prevention    11. Grief and Loss    12. Strengths    13. Wellness    Mindfulness

## 2021-06-13 NOTE — PROGRESS NOTES
Weekly Progress Note    Week of 12/14/20-12/20/20  Rishi Coombsshaneal  1950  [id number]      D) Pt attended 3 groups this week with 0 absences. Patient attended 0 individual sessions this week. A) Staff facilitated groups and reviewed tx progress. [facility] for [facility]. R) No VAP needed at this time.     Any significant events, defines as events that impact patients relationship with others inside and outside of treatment No  Indicate any changes or monitoring of physical or mental health problems No    Indicate involvement by any outside supports No. Pt declines attending support groups or individual psychotherapy at this time.    IAPP reviewed and modified as needed. NA  Pt working on the following dimensions:    Dimension #1 - Withdrawal Potential - Risk 1. Pt stable on Suboxone. No other concerns.   Specific goals from treatment plan addressed this week:  Patient to maintain abstinence throughout outpatient treatment.   Effectiveness of strategies:  Strategies appear to be effective.     Dimension #2 - Biomedical - Risk 1. No current concerns.   Specific goals from treatment plan addressed this week:  Patient to maintain stable health throughout outpatient treatment.   Effectiveness of strategies:  Strategies appear to be effective.    Dimension #3 - Emotional/Behavioral/Cognitive - Risk 2. Rishi reports continuing anxiety and seeming anger towards his former wife. He appears to have difficulty staying present with uncomfortable emotions. He appears to be somewhat isolated. He notes continuing interviewing for jobs which makes him feel better due to the potential structure and extra income.   Active interventions to stabilize mental health symptoms:  Rishi reports playing music daily.  Specific goals from treatment plan addressed this week:  Establish and maintain mental and emotional health.  Effectiveness of strategies:  Strategies appear to be questionable.    Dimension #4 - Treatment Acceptance/Resistance -  Risk 2. Rishi has actively participated in treatment groups. He has expressed ambiguity around engaging individual psychotherapy or participating in recovery support group meetings. He has reportedly applied for some jobs.  Specific goals from treatment plan addressed this week:  Patient to increase motivation towards recovery by participating in outpatient programming.   Effectiveness of strategies:  Strategies appear to be effective.    Dimension #5 - Relapse Potential - Risk 3.Rishi denies any use over the past week though notes some preoccupation. He appears to have limited interventions and presents as somewhat isolated. He is declining to engage peer recovery support at this time. He appears to be somewhat isolated and has expressed resistance to engaging a larger recovery community at this time.   Specific goals from treatment plan addressed this week:  Develop and utilize practical, effective relapse-prevention strategies and tools.   Effectiveness of strategies:  Strategies appear to be questionable.    Dimension #6 - Recovery Environment - Risk 3. Rishi presents as somewhat isolated. He has been provided resources for non-AA recovery support groups though is declining to engage at this time. He has declined a referral for individual psychotherapy at this time. He has reportedly applied for two jobs.    Specific goals from treatment plan addressed this week:  Establish and engage a widespread, redundant recovery support network.   Effectiveness of strategies:  Strategies appear to be questionable.    T) Treatment plan updated No.  Patient notified and in agreement NA    Patient educated on Grief and Loss. Patient has completed 32 of 115 program hours at this time.     Projected discharge date is 5/18/21. Current discharge plan is PENDING.     Ancelmo Zaidi, MALINI, [facility], [facility] [place][note 2]  12/18/2020, 1:08 PM      Weekly Educational Topics Date   1. Understanding Dual Diagnoses, week 1    2. Understanding Dual Diagnoses,  week 2    3. Stress Management    4. Feelings/Emotions    5. Thinking    6. Building Recovery Support    7. Developing Assertive Communication Skills 11/23/20; 11/25/20;    8. Relapse Prevention 11/30/20; 12/2/20; 12/4/20;    9. Relationships/Boundaries 12/7/20; 12/9/20; 12/11/20;   10. Grief and Loss 12/14/20; 12/16/20; 12/18/20;    11. Strengths    12. Wellness

## 2021-06-13 NOTE — PROGRESS NOTES
Telemedicine Visit: The patient's condition can be safely assessed and treated via synchronous audio and visual telemedicine encounter.      Reason for Telemedicine Visit: Services only offered telehealth    Originating Site (Patient Location): Patient's home    Distant Site (Provider Location): [facility]: [facility]    Consent:  The patient/guardian has verbally consented to: the potential risks and benefits of telemedicine (video visit) versus in person care; bill my insurance or make self-payment for services provided; and responsibility for payment of non-covered services.     Mode of Communication:  Video Conference via Zoom    Call Started at: 09:15 AM  Call Ended at: 12:03 PM    As the provider I attest to compliance with applicable laws and regulations related to telemedicine.

## 2021-06-13 NOTE — PROGRESS NOTES
Weekly Progress Note    Week of 11/23/20-11/29/20  Rishi Coombsshaneal  1950  [id number]      D) Pt attended 2 groups this week with 0 absences. Patient attended 0 individual sessions this week. A) Staff facilitated groups and reviewed tx progress. Assessed for [facility]. R) No VAP needed at this time.     Any significant events, defines as events that impact patients relationship with others inside and outside of treatment No  Indicate any changes or monitoring of physical or mental health problems No    Indicate involvement by any outside supports No - Pt reportedly attended an AA meeting over the weekend and subsequently notified counselor he does not adhere to the AA philosophy. Pt has been provided alternative group support resources though acknowledges some unspecified resistance.   IAPP reviewed and modified as needed. NA  Pt working on the following dimensions:    Dimension #1 - Withdrawal Potential - Risk 1. Pt stable on Suboxone.  Specific goals from treatment plan addressed this week:  Patient to maintain abstinence throughout outpatient treatment.   Effectiveness of strategies:  Strategies appear to be effective.     Dimension #2 - Biomedical - Risk 1. No current concerns.   Specific goals from treatment plan addressed this week:  Patient to maintain stable health throughout outpatient treatment.   Effectiveness of strategies:  Strategies appear to be effective.    Dimension #3 - Emotional/Behavioral/Cognitive - Risk 2. Rishi reports continuing anxiety and seeming anger towards his former wife. He appears to have difficulty staying present with uncomfortable emotions. He appears to be somewhat isolated.   Active interventions to stabilize mental health symptoms:  Rishi reports playing music daily.  Specific goals from treatment plan addressed this week:  Establish and maintain mental and emotional health.  Effectiveness of strategies:  Strategies appear to be questionable.    Dimension #4 - Treatment  Acceptance/Resistance - Risk 2. Rishi has actively participated in his first two treatment groups. He has expressed ambiguity around engaging individual psychotherapy or participating in recovery support group meetings.   Specific goals from treatment plan addressed this week:  Patient to increase motivation towards recovery by participating in outpatient programming.   Effectiveness of strategies:  Strategies appear to be effective.    Dimension #5 - Relapse Potential - Risk 3. Rishi denies any significant cravings though appears to have limited interventions. He appears to be somewhat isolated and has expressed resistance to engaging a larger recovery community at this time.   Specific goals from treatment plan addressed this week:  Develop and utilize practical, effective relapse-prevention strategies and tools.   Effectiveness of strategies:  Strategies appear to be questionable.    Dimension #6 - Recovery Environment - Risk 3. Rishi presents as somewhat isolated. He plans to spend Thanksgiving alone. He reportedly attended an AA meeting though subsequently informed counselor he doesn't like the AA modality and was provided a list of alternative meetings though has expressed some ambiguity around engaging outside support. He has declined a referral for individual psychotherapy at this time. He is reportedly searching for a job though it is unclear how pro-active he is in this.   Specific goals from treatment plan addressed this week:  Establish and engage a widespread, redundant recovery support network.   Effectiveness of strategies:  Strategies appear to be questionable.    T) Treatment plan updated yes.  Patient notified and in agreement Yes.    Patient educated on Communicaton. Patient has completed 6 of 115 program hours at this time.     Projected discharge date is 5/18/21. Current discharge plan is PENDING.     [facility]INI Aden, [facility]  11/25/2020, 6:08 PM      Weekly Educational Topics Date   1.  Understanding Dual Diagnoses, week 1    2. Understanding Dual Diagnoses, week 2    3. Stress Management    4. Feelings/Emotions    5. Thinking    6. Building Recovery Support    7. Developing Assertive Communication Skills 11/23/20; 11/25/20;    8. Relapse Prevention    9. Relationships/Boundaries    10. Grief and Loss    11. Strengths    12. Wellness

## 2021-06-13 NOTE — PROGRESS NOTES
Weekly Progress Note    Week of 11/30/20-12/6/20  Rishi Coombsshaneal  1950  [id number]      D) Pt attended 3 groups this week with 0 absences. Patient attended 0 individual sessions this week. A) Staff facilitated groups and reviewed tx progress. Assessed for [facility]. R) No VAP needed at this time.     Any significant events, defines as events that impact patients relationship with others inside and outside of treatment No  Indicate any changes or monitoring of physical or mental health problems No    Indicate involvement by any outside supports No. Pt declines attending support groups or individual psychotherapy at this time.    IAPP reviewed and modified as needed. NA  Pt working on the following dimensions:    Dimension #1 - Withdrawal Potential - Risk 1. Pt stable on Suboxone. He reports consuming approximately 2 oz liquor on 11/25 and denies significant intoxication or any withdrawal.   Specific goals from treatment plan addressed this week:  Patient to maintain abstinence throughout outpatient treatment.   Effectiveness of strategies:  Strategies appear to be effective.     Dimension #2 - Biomedical - Risk 1. No current concerns.   Specific goals from treatment plan addressed this week:  Patient to maintain stable health throughout outpatient treatment.   Effectiveness of strategies:  Strategies appear to be effective.    Dimension #3 - Emotional/Behavioral/Cognitive - Risk 2. Rishi reports continuing anxiety and seeming anger towards his former wife. He appears to have difficulty staying present with uncomfortable emotions. He appears to be somewhat isolated.   Active interventions to stabilize mental health symptoms:  Rishi reports playing music daily.  Specific goals from treatment plan addressed this week:  Establish and maintain mental and emotional health.  Effectiveness of strategies:  Strategies appear to be questionable.    Dimension #4 - Treatment Acceptance/Resistance - Risk 2. Rishi has actively  "participated in treatment groups. He has expressed ambiguity around engaging individual psychotherapy or participating in recovery support group meetings. He has reportedly applied for some jobs.  Specific goals from treatment plan addressed this week:  Patient to increase motivation towards recovery by participating in outpatient programming.   Effectiveness of strategies:  Strategies appear to be effective.    Dimension #5 - Relapse Potential - Risk 3. Rishi reports consuming approximately 2 oz liquor on 11/25 to \"celebrate\" the holiday. He notes pouring the bottle down the drain and denies subsequent use. He appears to have limited interventions and presents as somewhat isolated. He is declining to engage peer recovery support at this time. He appears to be somewhat isolated and has expressed resistance to engaging a larger recovery community at this time.   Specific goals from treatment plan addressed this week:  Develop and utilize practical, effective relapse-prevention strategies and tools.   Effectiveness of strategies:  Strategies appear to be questionable.    Dimension #6 - Recovery Environment - Risk 3. Rishi presents as somewhat isolated. He spent Thanksgiving alone. He has been provided resources for non-AA recovery support groups though is declining to engage at this time. He has declined a referral for individual psychotherapy at this time. He has reportedly applied for two jobs.    Specific goals from treatment plan addressed this week:  Establish and engage a widespread, redundant recovery support network.   Effectiveness of strategies:  Strategies appear to be questionable.    T) Treatment plan updated No.  Patient notified and in agreement NA    Patient educated on Relapse Prevention. Patient has completed 15 of 115 program hours at this time.     Projected discharge date is 5/18/21. Current discharge plan is PENDING.     Ancelmo Zaidi, MALINI, [facility], [facility] [place]  12/4/2020, 1:22 PM      Weekly Educational " Topics Date   1. Understanding Dual Diagnoses, week 1    2. Understanding Dual Diagnoses, week 2    3. Stress Management    4. Feelings/Emotions    5. Thinking    6. Building Recovery Support    7. Developing Assertive Communication Skills 11/23/20; 11/25/20;    8. Relapse Prevention 11/30/20; 12/2/20; 12/4/20;    9. Relationships/Boundaries    10. Grief and Loss    11. Strengths    12. Wellness

## 2021-06-14 NOTE — PROGRESS NOTES
Rishi Miramontes attended 3 hours of group today.     The group topic was Feelings/Emotions, patient was responsive to topic.     Patient's engagement in the group session: medium     Total group size: 6      JOYCE Mendez  1/15/2021, 12:39 PM

## 2021-06-14 NOTE — PROGRESS NOTES
Telemedicine Visit: The patient's condition can be safely assessed and treated via synchronous audio and visual telemedicine encounter.      Reason for Telemedicine Visit: Services only offered telehealth    Originating Site (Patient Location): Patient's home    Distant Site (Provider Location): [facility]: [facility]    Consent:  The patient/guardian has verbally consented to: the potential risks and benefits of telemedicine (video visit) versus in person care; bill my insurance or make self-payment for services provided; and responsibility for payment of non-covered services.     Mode of Communication:  Video Conference via Zoom    Call Started at: 09:30 AM  Call Ended at: 12:10 PM    As the provider I attest to compliance with applicable laws and regulations related to telemedicine.

## 2021-06-14 NOTE — PROGRESS NOTES
Weekly Progress Note    Week of 12/28/20-1/3/20  Rishi Coombsshaneal  1950  [id number]      D) Pt attended 2 groups this week with 0 absences. Patient attended 0 individual sessions this week. A) Staff facilitated groups and reviewed tx progress. Assessed for [facility]. R) No VAP needed at this time.     Any significant events, defines as events that impact patients relationship with others inside and outside of treatment No  Indicate any changes or monitoring of physical or mental health problems No    Indicate involvement by any outside supports No. Pt declines attending support groups or individual psychotherapy at this time.    IAPP reviewed and modified as needed. NA  Pt working on the following dimensions:    Dimension #1 - Withdrawal Potential - Risk 1. Pt stable on Suboxone. No other concerns.   Specific goals from treatment plan addressed this week:  Patient to maintain abstinence throughout outpatient treatment.   Effectiveness of strategies:  Strategies appear to be effective.     Dimension #2 - Biomedical - Risk 1. No current concerns.   Specific goals from treatment plan addressed this week:  Patient to maintain stable health throughout outpatient treatment.   Effectiveness of strategies:  Strategies appear to be effective.    Dimension #3 - Emotional/Behavioral/Cognitive - Risk 2. Rishi reports continuing anxiety and seeming anger towards his former wife. He appears to have difficulty staying present with uncomfortable emotions. He appears to be somewhat isolated.  Active interventions to stabilize mental health symptoms:  Rishi reports playing music daily.  Specific goals from treatment plan addressed this week:  Establish and maintain mental and emotional health.  Effectiveness of strategies:  Strategies appear to be questionable.    Dimension #4 - Treatment Acceptance/Resistance - Risk 2. Rishi has actively participated in treatment groups. He has expressed unwillingness around engaging individual  psychotherapy or participating in recovery support group meetings.  Pt appears resistant to following counselor recommendations despite notification of the importance of establishing recovery support while participating in programming to ensure the availability of such when treatment ultimately ends. Pt was staffed with OP Tx team on 12/23/20 and will be permitted to continue in programming if he finds value in such though will likely not be offered Phase III unless he has taken proactive measures towards change. Pt was informed of such on 12/28/20.  Specific goals from treatment plan addressed this week:  Patient insists his job search constitutes daily recovery activity.  Effectiveness of strategies:  Strategies appear to be questionable at this time.    Dimension #5 - Relapse Potential - Risk 3.Rishi reports consuming alcohol over [place] with limited insight around use. He appears to have limited interventions and presents as somewhat isolated. He is declining to engage peer recovery support at this time. He appears to be somewhat isolated and has expressed resistance to engaging a larger recovery community at this time.    Specific goals from treatment plan addressed this week:  Develop and utilize practical, effective relapse-prevention strategies and tools.   Effectiveness of strategies:  Strategies appear to be questionable.    Dimension #6 - Recovery Environment - Risk 3. Rishi presents as somewhat isolated. He has been provided resources for non-AA recovery support groups though is declining to engage at this time. He has declined a referral for individual psychotherapy at this time. He has reportedly begun a new job at [employer].    Specific goals from treatment plan addressed this week:  Establish and engage a widespread, redundant recovery support network.   Effectiveness of strategies:  Strategies appear to be questionable.    T) Treatment plan updated No.  Patient notified and in agreement NA    Patient  educated on Dual Diagnoses Wk1. Patient has completed 43 of 115 program hours at this time.     Projected discharge date is 5/18/21. Current discharge plan is PENDING.     MALINI Aden, [facility], [facility]  12/30/2020, 2:12 PM      Weekly Educational Topics Date   1. Understanding Dual Diagnoses, week 1 12/28/20; 12/30/20;    2. Understanding Dual Diagnoses, week 2    3. Stress Management    4. Feelings/Emotions    5. Thinking    6. Building Recovery Support    7. Developing Assertive Communication Skills 11/23/20; 11/25/20;    8. Relapse Prevention 11/30/20; 12/2/20; 12/4/20;    9. Relationships/Boundaries 12/7/20; 12/9/20; 12/11/20;   10. Grief and Loss 12/14/20; 12/16/20; 12/18/20;    11. Strengths 12/21/20; 12/23/20;

## 2021-06-14 NOTE — PROGRESS NOTES
Rishi Miramontes attended 3 hours of group today.     The group topic was Dual Diagnosis II, patient was responsive to topic.     Patient's engagement in the group session: medium     Total group size: 8    Pt reports attending a SM[facility] meeting over the weekend and finding it helpful. He plans to return.     Ancelmo Zaidi [facility], [facility]  1/4/2021, 12:24 PM

## 2021-06-14 NOTE — PROGRESS NOTES
Telemedicine Visit: The patient's condition can be safely assessed and treated via synchronous audio and visual telemedicine encounter.      Reason for Telemedicine Visit: Services only offered telehealth    Originating Site (Patient Location): Patient's home    Distant Site (Provider Location): Provider Remote Setting- Home Office    Consent:  The patient/guardian has verbally consented to: the potential risks and benefits of telemedicine (video visit) versus in person care; bill my insurance or make self-payment for services provided; and responsibility for payment of non-covered services.     Mode of Communication:  Video Conference via Zoom    Call Started at: 10:15 AM  Call Ended at: 12:10 PM    As the provider I attest to compliance with applicable laws and regulations related to telemedicine.

## 2021-06-14 NOTE — PROGRESS NOTES
Rishi Miramontes attended 3 hours of group today.     The group topic was Feelings/Emotions, patient was responsive to topic.     Patient's engagement in the group session: high     Total group size: 8      YAW Hwang, [facility]  1/11/2021, 12:38 PM

## 2021-06-14 NOTE — PROGRESS NOTES
Rishi Miramontes attended 3 hours of group today.     The group topic was Change, patient was responsive to topic.     Patient's engagement in the group session: medium     Total group size: 6      YAW Hwang, [facility]  1/25/2021, 12:34 PM

## 2021-06-14 NOTE — PROGRESS NOTES
Rishi Miramontes attended 3 hours of group today.     The group topic was Dual Diagnosis I, patient was responsive to topic.     Patient's engagement in the group session: medium     Total group size: 8      YAW Hwang, [facility]  12/30/2020, 1:39 PM

## 2021-06-14 NOTE — PROGRESS NOTES
"Met with Pt briefly after group to review treatment progress and counselor concerns around his lack of recovery work outside of scheduled treatment groups. Pt acknowledged continuing to struggle with considerable grief around loss of family and friends over the past few years though is unwilling to explore any formal counseling having had mixed results with such in the past. He continues to show reticence around recovery support groups despite acknowledging the strength of connecting with others; he also continues to equate all recovery support group offerings with AA despite counselor's continued clarification to the other. He continues to impose his own interpretation of \"recovery-oriented activities\" to his life despite counselor's clarification that this is intended to specify activities specific to recovery from alcoholism, such as reading, writing/journaling, engaging others in recovery, attending meetings, etc. Pt was informed that he was staffed with the full OP Tx team on 12/23 and the determination was made that in the absence of his changing his behavior he will likely be completed in IOP at the 90 hour milvia, tentatively scheduled for 2/12/21. Pt indicated his understanding.   "

## 2021-06-14 NOTE — PROGRESS NOTES
Rishi Miramontes attended 3 hours of group today.     The group topic was Change, patient was responsive to topic.     Patient's engagement in the group session: high     Total group size: 6      YAW Hwang, [facility]  1/29/2021, 12:10 PM

## 2021-06-14 NOTE — PROGRESS NOTES
Rishi Miramontes attended 3 hours of group today.     The group topic was Dual Diagnosis II, patient was responsive to topic.     Patient's engagement in the group session: medium     Total group size: 3      YAW Hwang, [facility]  1/6/2021, 12:06 PM

## 2021-06-14 NOTE — PROGRESS NOTES
Addiction Outpatient Weekly Clinical Staffing      Name: Rishi Miramontes  MRN: [id number]    Diagnosis: Alcohol Use Disorder, severe (F10.20)    Admit Date: 11/18/20    Program:[facility]   Date: 12/23/2020     Present:  JOYCE Rodriguez, YAW Hwang LADC, VICTOR HUGO Velez, VICTOR HUGO Tilley, Reyanldo Hernadez [facility] and Milly Garcia [facility]     Dimension One:  1   Dimension Two:  1   Dimension Three:  2     Dimension Four:  2    Dimension Five: 3   Dimension Six:  2     Specific Treatment Plan Methods and Goals:  Continue medication management with Dr. Myers; Continue job search;      Specific Identified Strengths and/or Barriers:  Some background with recovery; Considerable isolation; Unaddressed anger/grief; Unwilling to engage recovery support; Unwilling to engage individual psychotherapy;       Developed Plan:  Begin PT job for structure and income; Determine overall level-of-motivation;     Date: 12/23/2020 Time: 2:20 PM    Staff Signature: YAW Hwang LADC

## 2021-06-14 NOTE — PROGRESS NOTES
Rishi Miramontes attended 2 hours of group today.     The group topic was Dual Diagnosis I, patient was responsive to topic.     Patient's engagement in the group session: high     Total group size: 8    Pt reported using alcohol over the holiday weekend, ostensibly to celebrate. He denied significant intoxication or withdrawal. He did not present as open to input or feedback around exploring his relapse.       Ancelmo Zaidi [facility], [facility]  12/28/2020, 1:14 PM

## 2021-06-14 NOTE — PROGRESS NOTES
Weekly Progress Note    Week of 1/11/20-1/17/20  Rishi Miramontes  1950  [id number]      D) Pt attended 2 groups this week with 1 excused absence due to not feeling well. Patient attended 0 individual sessions this week. A) Staff facilitated groups and reviewed tx progress. Assessed for [facility]. R) No VAP needed at this time.     Any significant events, defines as events that impact patients relationship with others inside and outside of treatment No  Indicate any changes or monitoring of physical or mental health problems No    Indicate involvement by any outside supports No. Pt declines attending support groups or individual psychotherapy at this time.    IAPP reviewed and modified as needed. NA  Pt working on the following dimensions:    Dimension #1 - Withdrawal Potential - Risk 1. Pt stable on Suboxone. No other concerns.   Specific goals from treatment plan addressed this week:  Patient to maintain abstinence throughout outpatient treatment.   Effectiveness of strategies:  Strategies appear to be effective.     Dimension #2 - Biomedical - Risk 1. No current concerns. He notes fatigue from his new job.  Specific goals from treatment plan addressed this week:  Patient to maintain stable health throughout outpatient treatment.   Effectiveness of strategies:  Strategies appear to be effective.    Dimension #3 - Emotional/Behavioral/Cognitive - Risk 2. Rishi reports continuing anxiety and seeming anger towards his former wife. He appears to have difficulty staying present with uncomfortable emotions. He appears to be somewhat isolated. He reviewed his previous experience with Transcendental Meditation and agreed to try this again. He notes fatigue from his new job.  Active interventions to stabilize mental health symptoms:  Rishi reports playing music daily.  Specific goals from treatment plan addressed this week:  Establish and maintain mental and emotional health.  Effectiveness of strategies:  Strategies appear  to be questionable.    Dimension #4 - Treatment Acceptance/Resistance - Risk 2. Rishi has actively participated in treatment groups. He has not yet returned to [facility] [employer].    Specific goals from treatment plan addressed this week:  Patient insists his job search constitutes daily recovery activity.  Effectiveness of strategies:  Strategies appear to be questionable at this time.    Dimension #5 - Relapse Potential - Risk 3. Rishi denies any alcohol use over the past week. He appears to have limited interventions and presents as somewhat isolated. He appears to be somewhat isolated and has expressed resistance to engaging a larger recovery community at this time, though he did attend a [facility] [employer] meeting online a couple of weeks ago though has not yet returned. He also plans to resume Transcendental Meditation which has been helpful in the past.  Specific goals from treatment plan addressed this week:  Develop and utilize practical, effective relapse-prevention strategies and tools.   Effectiveness of strategies:  Strategies appear to be questionable.    Dimension #6 - Recovery Environment - Risk 3. Rishi presents as somewhat isolated.He has declined a referral for individual psychotherapy at this time. He has reportedly begun a new job at [employer]. He notes fatigue from his new job.  Specific goals from treatment plan addressed this week:  Establish and engage a widespread, redundant recovery support network.   Effectiveness of strategies:  Strategies appear to be questionable.    T) Treatment plan updated No.  Patient notified and in agreement NA    Patient educated on Feelings/Emotions. Patient has completed 58 of 115 program hours at this time.     Projected discharge date is 5/18/21. Current discharge plan is PENDING.     MALINI Aden, [facility], [facility]  1/14/2021, 10:00 PM      Weekly Educational Topics Date   1. Understanding Dual Diagnoses, week 1 12/28/20; 12/30/20;    2. Understanding Dual Diagnoses,  week 2 1/4/21; 1/6/21; 1/8/21;    3. Stress Management    4. Feelings/Emotions 1/11/21; 1/15/21   5. Thinking    6. Building Recovery Support    7. Developing Assertive Communication Skills 11/23/20; 11/25/20;    8. Relapse Prevention 11/30/20; 12/2/20; 12/4/20;    9. Relationships/Boundaries 12/7/20; 12/9/20; 12/11/20;   10. Grief and Loss 12/14/20; 12/16/20; 12/18/20;    11. Strengths 12/21/20; 12/23/20;

## 2021-06-14 NOTE — PROGRESS NOTES
"Addiction  Nurse Only Visit Note    2017  Date of last visit: 17    Reason for today's visit:   Chief Complaint   Patient presents with     Follow-up       Vitals:    17 1240   BP: (!) 168/99   Patient Site: Right Arm   Patient Position: Sitting   Cuff Size: Adult Regular   Pulse: (!) 101   Temp: 98.2  F (36.8  C)   TempSrc: Oral   Weight: 181 lb (82.1 kg)   Height: 5' 9\" (1.753 m)        If having pain, who will address pain:  PCP    Is pt assisted: No    Urine for toxicology sent today: yes    Last UA date: 17  Reviewed with patient: yes  Results: DAM neg, BUP pos    AIMS:     Pill count: NA  (Controlled substance only)  Medications needing renewal: see note    Substance use history:    Using alcohol:No  Using street drugs or unprescribed medications: Yes: Describe: THC, maybe weekly  Using opioids other that Suboxone:No  Using tobacco:Yes: Describe: vaping more, using 1 pack /week    Recovery environment:  Attending formal chemical dependency programming: No  Attending \"outside\" mutual help meetings: No  Has a chemical dependency sponsor: No  Has other elements of structure: Yes: Describe: 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 See below for embedded report.    Note: Reports things are going okay, experiencing an increase in arthritis. Tries to keep active and moving. 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 Says he quit smoking and has been vaping instead and has noticed an improvement in his singing and breathing. Also has been delivering pizza, says that it keeps him " moving and he sleeps very well at night.Talked briefly about loss of mother, grief and guilt feelings. Reports the Gabapentin has been very helpful with his anxiety. Irritated with his pharmacy because he says they have been shorting him with his Gabapentin refills. He says the bottle says 240 capsules but that he gets 180 one time and then 60 the next time. Accepted pharmacy's explaination of how they  dispensed the Gabapentin. He reports he has been taking 8 capsules a day instead of 6 caps because he thought he had 240 cap per month. Says he has been running out, worried about becoming tolerant. Discussed importance of not over taking. He is requesting increase in Gabapentin to 6-8 cap daily. Informed that request would need to be sent to Dr. Curiel.    Suboxone 8/2 mg sl fim, takes bid, #42 0-RF, called to pharmacy, spoke with pharmacist, order read back for accuracy, next appt 12/27/17 with Dr. Curiel.    Date of Last Office Visit: 9/6/17  Date of Next Office Visit: 12/27/17  No shows since last visit: 11/29/17  Cancellations since last visit: 9/12/17 (pt), 10/4/17 (provider),11/1117 (provider)  Medication Trazadone date last ordered: 10/6/17  Qty: 30  Refills: 0  Lapse in therapy greater than 7 days: unknown  Medication refill request verified as identical to current order: yes  Result of Last DAM, VPA, Li+ Level, or Carbamazepine Level (at or since last visit): Negative     [x] Medication refilled per [facility] M-1.   [] Medication unable to be refilled by RN due to criteria not met as indicated below:     []Eligibility - not seen in last year    []Supervision - no future appointment    []Compliance     []Verification - order discrepancy    []Controlled Medication    []Medication not included in RN Protocol    []90 - day supply request    []Other       Patient Instructions   Continue Medications as ordered.  No alcohol or unprescribed drug use.  No driving, if sedated.  Come to the Emergency Room if not feeling  safe.  Call the clinic with any questions [phone].  Follow up as directed, for your appointments, per your After Visit Summary Form.      Call the clinic if any concerns: [facility] [phone]  [facility] [phone]  and Report to the emergency room if you feel like harming yourself or others or are psychotic        Kelly K Kathryn    2017 12:44 PM    AMANUEL VARGHESE  Search Criteria: Last Name 'amanuel' and First Name 'yudith' and  = ' and Request Period =   to ' - 4 out of 4 Recipients Selected.  Fill Date Product, Str, Form Qty Days Pt ID Prescriber Written RX# N/R* Pharm **MED+  ---------- -------------------------------- ------ ---- --------- ---------- ---------- ------------ ----- --------- ------  2017 SUBOXONE 8 MG-2 MG SL FILM 30.00 15 [id number] 2017 [id number] R [id number] 480.0  11/15/2017 GABAPENTIN 100 MG CAPSULE 180.00 30 [id number] 2017 [id number] N [id number] 00.0  2017 SUBOXONE 8 MG-2 MG SL FILM 30.00 15 [id number] 2017 [id number] N [id number] 480.0  2017 GABAPENTIN 100 MG CAPSULE 60.00 10 [id number] 10/06/2017 [id number] N [id number] 00.0  10/19/2017 SUBOXONE 8 MG-2 MG SL FILM 30.00 15 [id number] 10/06/2017 [id number] R [id number] 480.0  10/08/2017 GABAPENTIN 100 MG CAPSULE 180.00 30 [id number] 10/06/2017 [id number] N [id number] 00.0  10/07/2017 SUBOXONE 8 MG-2 MG SL FILM 30.00 15 [id number] 10/06/2017 [id number] N [id number] 480.0  2017 SUBOXONE 8 MG-2 MG SL FILM 30.00 15 [id number] R [id number] 480.0  2017 GABAPENTIN 100 MG CAPSULE 240.00 40 [id number] N [id number] 00.0  2017 SUBOXONE 8 MG-2 MG SL FILM 30.00 15 [id number] N [id number] 480.0  *N/R N=New R=Refill  +MED Daily  Prescribers for prescriptions  listed  ----------------------------------------------------------------------------------------------------------------------------------  [id number] HALIMA GUEVARA MD; [address] [place] [address] [address], [place] [address]  Pharmacies that dispensed prescriptions listed  ----------------------------------------------------------------------------------------------------------------------------------  [id number] [employer] : ELZBIETA # [id number], [address] [facility] [address],  [id number] [employer].; : ELZ[address]ETA # [id number], [address] [facility] [address],  Patients that match search criteria  ----------------------------------------------------------------------------------------------------------------------------------  [id number] APULETTE COLLAZO 50; [address] [place] [address]  [id number] HAYDEN VARGHESE,  50; [address] [place] [address]  [id number] HAYDEN VARGHESE,  50; [address], [place] [address]  [id number] HAYDEN VARGHESE,  50; [address] AT A TIME, [place] [address]  MED Summary  This section displays cumulative MED values by unique recipient. The MED Max value is the maximum occurrence of cumulative MED  sustained for any 3 consecutive days. This value is calculated based on prescriptions dispensed during the date range requested.  -----------------------------------------------------------------------------------------------------------------------------------  960 HALIMA BLAKE; 1950; [address], [place] [address]  480 HAYDEN HALIMA; 1950; [address] A Time, [place] [address]

## 2021-06-14 NOTE — PROGRESS NOTES
Rishi Miramontes attended 3 hours of group on 01/22/2021.     The group topic was Thinking, patient was responsive to topic.     Patient's engagement in the group session: medium     Total number of patients present 5.     Counselor(s) present: JOYCE Mendez    Supervising MD: Dr. Ivory Hernadez, JOYCE  1/22/2021, 12:36 PM

## 2021-06-14 NOTE — PROGRESS NOTES
Rishi Miramontes attended 3 hours of group today.     The group topic was Dual Diagnosis II, patient was responsive to topic.     Patient's engagement in the group session: medium     Total group size: 7      JOYCE Mendez  1/8/2021, 12:26 PM

## 2021-06-14 NOTE — PROGRESS NOTES
Addiction Outpatient Weekly Clinical Staffing      Name: Rishi Miramontes  MRN: [id number]    Diagnosis: Alcohol Use Disorder, severe (F10.20)    Admit Date: 11/18/20    Program:[facility]   Date: 01/27/2021     Present:  JOYCE Rodriguez, YAW Hwang LADC, VICTOR HUGO Velez, JOYCE Tilley, VICTOR HUGO Mendez and VICTOR HUGO Squires     Dimension One:  1   Dimension Two:  1   Dimension Three:  2     Dimension Four:  2    Dimension Five: 2   Dimension Six:  2     Specific Treatment Plan Methods and Goals:  Continue medication management with Dr. Myers; Continue job search;      Specific Identified Strengths and/or Barriers:  Some background with recovery; Considerable isolation; Unaddressed anger/grief; Unwilling to engage recovery support; Unwilling to engage individual psychotherapy; Unwilling to follow counselor recommendations for specific interventions; Argumentative;       Developed Plan:  Continue PT job for structure and income; Continue IOP Phase II; Likely complete following Phase II.     Date: 1/27/2021 Time: 2:22 PM    Staff Signature: YAW Hwang LADC

## 2021-06-14 NOTE — PROGRESS NOTES
Weekly Progress Note    Week of 1/18/20-1/24/20  Rishi Miramontes  1950  [id number]      D) Pt attended 3 groups this week with 0 absences. Patient attended 0 individual sessions this week. A) Staff facilitated groups and reviewed tx progress. Assessed for [facility]. R) No VAP needed at this time.     Any significant events, defines as events that impact patients relationship with others inside and outside of treatment No  Indicate any changes or monitoring of physical or mental health problems No    Indicate involvement by any outside supports No. Pt declines attending support groups or individual psychotherapy at this time.    IAPP reviewed and modified as needed. NA  Pt working on the following dimensions:    Dimension #1 - Withdrawal Potential - Risk 1. Pt stable on Suboxone. No other concerns.   Specific goals from treatment plan addressed this week:  Patient to maintain abstinence throughout outpatient treatment.   Effectiveness of strategies:  Strategies appear to be effective.     Dimension #2 - Biomedical - Risk 1. No current concerns. He notes fatigue from his new job.  Specific goals from treatment plan addressed this week:  Patient to maintain stable health throughout outpatient treatment.   Effectiveness of strategies:  Strategies appear to be effective.    Dimension #3 - Emotional/Behavioral/Cognitive - Risk 2. Rishi reports continuing anxiety and seeming anger towards his former wife. He appears to have difficulty staying present with uncomfortable emotions. He appears to be somewhat isolated. He is declining to consider transcendental meditation at this time. He notes continuing fatigue from his new job.  Active interventions to stabilize mental health symptoms:  Rishi reports playing music daily.  Specific goals from treatment plan addressed this week:  Establish and maintain mental and emotional health.  Effectiveness of strategies:  Strategies appear to be questionable.    Dimension #4 - Treatment  Acceptance/Resistance - Risk 2. Rishi has actively participated in treatment groups. He has not yet returned to [facility] [employer].    Specific goals from treatment plan addressed this week:  Patient insists his job search constitutes daily recovery activity.  Effectiveness of strategies:  Strategies appear to be questionable at this time.    Dimension #5 - Relapse Potential - Risk 3. Rishi denies any alcohol use over the past week. He appears to have limited interventions and presents as somewhat isolated. He appears to be somewhat isolated and has expressed resistance to engaging a larger recovery community at this time, though he did attend a [facility] [employer] meeting online a couple of weeks ago though has not yet returned. He also planned to resume Transcendental Meditation which has been helpful in the past though is now declining to pursue this.  Specific goals from treatment plan addressed this week:  Develop and utilize practical, effective relapse-prevention strategies and tools.   Effectiveness of strategies:  Strategies appear to be questionable.    Dimension #6 - Recovery Environment - Risk 3. Rishi presents as somewhat isolated.He has declined a referral for individual psychotherapy at this time. He has reportedly begun a new job at [employer]. He notes fatigue from his new job.  Specific goals from treatment plan addressed this week:  Establish and engage a widespread, redundant recovery support network.   Effectiveness of strategies:  Strategies appear to be questionable.    T) Treatment plan updated No.  Patient notified and in agreement NA    Patient educated on Thinking. Patient has completed 67 of 115 program hours at this time.     Projected discharge date is 5/18/21. Current discharge plan is PENDING.     MALINI Aden, [facility], [facility]  1/22/2021, 1:21 PM      Weekly Educational Topics Date   1. Understanding Dual Diagnoses, week 1 12/28/20; 12/30/20;    2. Understanding Dual Diagnoses, week 2 1/4/21;  1/6/21; 1/8/21;    3. Stress Management    4. Feelings/Emotions 1/11/21; 1/15/21   5. Thinking 1/18/21; 1/20/21; 1/22/21;    6. Building Recovery Support    7. Developing Assertive Communication Skills 11/23/20; 11/25/20;    8. Relapse Prevention 11/30/20; 12/2/20; 12/4/20;    9. Relationships/Boundaries 12/7/20; 12/9/20; 12/11/20;   10. Grief and Loss 12/14/20; 12/16/20; 12/18/20;    11. Strengths 12/21/20; 12/23/20;

## 2021-06-14 NOTE — PROGRESS NOTES
Rishi Miramontes attended 3 hours of group today.     The group topic was Strengths, patient was responsive to topic.     Patient's engagement in the group session: high     Total group size: 6      YAW Hwang, [facility]  12/23/2020, 2:04 PM

## 2021-06-14 NOTE — PROGRESS NOTES
Telemedicine Visit: The patient's condition can be safely assessed and treated via synchronous audio and visual telemedicine encounter.      Reason for Telemedicine Visit: Services only offered telehealth    Originating Site (Patient Location): Patient's home    Distant Site (Provider Location): [facility]: [facility]    Consent:  The patient/guardian has verbally consented to: the potential risks and benefits of telemedicine (video visit) versus in person care; bill my insurance or make self-payment for services provided; and responsibility for payment of non-covered services.     Mode of Communication:  Video Conference via Zoom    Call Started at: 09:30 AM  Call Ended at: 12:03 PM    As the provider I attest to compliance with applicable laws and regulations related to telemedicine.

## 2021-06-14 NOTE — PROGRESS NOTES
Weekly Progress Note    Week of 1/4/20-1/8/20  Rishi HALL Kulwinder  1950  [id number]      D) Pt attended 3 groups this week with 0 absences. Patient attended 0 individual sessions this week. A) Staff facilitated groups and reviewed tx progress. Assessed for [facility]. R) No VAP needed at this time.     Any significant events, defines as events that impact patients relationship with others inside and outside of treatment No  Indicate any changes or monitoring of physical or mental health problems No    Indicate involvement by any outside supports No. Pt declines attending support groups or individual psychotherapy at this time.    IAPP reviewed and modified as needed. NA  Pt working on the following dimensions:    Dimension #1 - Withdrawal Potential - Risk 1. Pt stable on Suboxone. No other concerns.   Specific goals from treatment plan addressed this week:  Patient to maintain abstinence throughout outpatient treatment.   Effectiveness of strategies:  Strategies appear to be effective.     Dimension #2 - Biomedical - Risk 1. No current concerns.   Specific goals from treatment plan addressed this week:  Patient to maintain stable health throughout outpatient treatment.   Effectiveness of strategies:  Strategies appear to be effective.    Dimension #3 - Emotional/Behavioral/Cognitive - Risk 2. Rishi reports continuing anxiety and seeming anger towards his former wife. He appears to have difficulty staying present with uncomfortable emotions. He appears to be somewhat isolated. He reviewed his previous experience with Transcendental Meditation and agreed to try this again.   Active interventions to stabilize mental health symptoms:  Rishi reports playing music daily.  Specific goals from treatment plan addressed this week:  Establish and maintain mental and emotional health.  Effectiveness of strategies:  Strategies appear to be questionable.    Dimension #4 - Treatment Acceptance/Resistance - Risk 2. Rishi has actively  participated in treatment groups. He reported attending a SMART Recovery meeting and finding it intriguing and plans to return.   Specific goals from treatment plan addressed this week:  Patient insists his job search constitutes daily recovery activity.  Effectiveness of strategies:  Strategies appear to be questionable at this time.    Dimension #5 - Relapse Potential - Risk 3. Rishi denies any alcohol use over the past week. He appears to have limited interventions and presents as somewhat isolated. He appears to be somewhat isolated and has expressed resistance to engaging a larger recovery community at this time, though he did attend a SMART Recovery meeting online and reported finding this intriguing. He also plans to resume Transcendental Meditation which has been helpful in the past.  Specific goals from treatment plan addressed this week:  Develop and utilize practical, effective relapse-prevention strategies and tools.   Effectiveness of strategies:  Strategies appear to be questionable.    Dimension #6 - Recovery Environment - Risk 3. Rishi presents as somewhat isolated. He reported attending a SMART Recovery meeting online and finding it intriguing.He has declined a referral for individual psychotherapy at this time. He has reportedly begun a new job at [employer].    Specific goals from treatment plan addressed this week:  Establish and engage a widespread, redundant recovery support network.   Effectiveness of strategies:  Strategies appear to be questionable.    T) Treatment plan updated No.  Patient notified and in agreement NA    Patient educated on Dual Diagnoses Wk2. Patient has completed 52 of 115 program hours at this time.     Projected discharge date is 5/18/21. Current discharge plan is PENDING.     Ancelmo Zaidi, MALINI, [facility], [facility]  1/8/2021, 12:40 PM      Weekly Educational Topics Date   1. Understanding Dual Diagnoses, week 1 12/28/20; 12/30/20;    2. Understanding Dual Diagnoses, week 2 1/4/21;  1/6/21; 1/8/21;    3. Stress Management    4. Feelings/Emotions    5. Thinking    6. Building Recovery Support    7. Developing Assertive Communication Skills 11/23/20; 11/25/20;    8. Relapse Prevention 11/30/20; 12/2/20; 12/4/20;    9. Relationships/Boundaries 12/7/20; 12/9/20; 12/11/20;   10. Grief and Loss 12/14/20; 12/16/20; 12/18/20;    11. Strengths 12/21/20; 12/23/20;

## 2021-06-14 NOTE — PROGRESS NOTES
Rishi Miramontes attended 3 hours of group today.     The group topic was Thinking, patient was responsive to topic.     Patient's engagement in the group session: medium     Total group size: 6      YAW Hwang, [facility]  1/18/2021, 12:21 PM

## 2021-06-14 NOTE — PROGRESS NOTES
Weekly Progress Note    Week of 1/25/20-1/31/20  Rishi Miramontes  1950  [id number]      D) Pt attended 2 groups this week with 0 absences. Patient attended 0 individual sessions this week. A) Staff facilitated groups and reviewed tx progress. Assessed for [facility]. R) No VAP needed at this time.     Any significant events, defines as events that impact patients relationship with others inside and outside of treatment No  Indicate any changes or monitoring of physical or mental health problems No    Indicate involvement by any outside supports No. Pt declines attending support groups or individual psychotherapy at this time.    IAPP reviewed and modified as needed. NA  Pt working on the following dimensions:    Dimension #1 - Withdrawal Potential - Risk 1. Pt stable on Suboxone. No other concerns.   Specific goals from treatment plan addressed this week:  Patient to maintain abstinence throughout outpatient treatment.   Effectiveness of strategies:  Strategies appear to be effective.     Dimension #2 - Biomedical - Risk 1. No current concerns. He notes fatigue from his new job.  Specific goals from treatment plan addressed this week:  Patient to maintain stable health throughout outpatient treatment.   Effectiveness of strategies:  Strategies appear to be effective.    Dimension #3 - Emotional/Behavioral/Cognitive - Risk 2. Rishi reports continuing anxiety and seeming anger towards his former wife. He appears to have difficulty staying present with uncomfortable emotions. He appears to be somewhat isolated. He is declining to consider transcendental meditation at this time. He notes continuing fatigue from his new job.  Active interventions to stabilize mental health symptoms:  Rishi reports playing music daily.  Specific goals from treatment plan addressed this week:  Establish and maintain mental and emotional health.  Effectiveness of strategies:  Strategies appear to be questionable.    Dimension #4 - Treatment  Acceptance/Resistance - Risk 2. Rishi has actively participated in treatment groups. He has not yet returned to [facility].    Specific goals from treatment plan addressed this week:  Patient insists his job search constitutes daily recovery activity.  Effectiveness of strategies:  Strategies appear to be questionable at this time.    Dimension #5 - Relapse Potential - Risk 3. Rishi denies any alcohol use over the past week. He appears to have limited interventions and presents as somewhat isolated. He appears to be somewhat isolated and has expressed resistance to engaging a larger recovery community at this time, though he did attend a [facility] meeting online a few weeks ago though has not yet returned. He also planned to resume Transcendental Meditation which has been helpful in the past though is now declining to pursue this.  Specific goals from treatment plan addressed this week:  Develop and utilize practical, effective relapse-prevention strategies and tools.   Effectiveness of strategies:  Strategies appear to be questionable.    Dimension #6 - Recovery Environment - Risk 3. Rishi presents as somewhat isolated.He has declined a referral for individual psychotherapy at this time. He has reportedly begun a new job at [employer]. He notes fatigue from his new job. It remains unclear what, if any, specific relapse-prevention tools Rishi is utilizing.   Specific goals from treatment plan addressed this week:  Establish and engage a widespread, redundant recovery support network.   Effectiveness of strategies:  Strategies appear to be questionable.    T) Treatment plan updated No.  Patient notified and in agreement NA    Patient educated on Change. Patient has completed 73 of 115 program hours at this time.     Projected discharge date is 5/18/21. Current discharge plan is PENDING.     Ancelmo Zaidi, MALINI, [facility], [facility]  1/29/2021, 12:41 PM      Weekly Educational Topics Date   1. Understanding Dual Diagnoses,  week 1 12/28/20; 12/30/20;    2. Understanding Dual Diagnoses, week 2 1/4/21; 1/6/21; 1/8/21;    3. Stress Management    4. Feelings/Emotions 1/11/21; 1/15/21   5. Thinking 1/18/21; 1/20/21; 1/22/21;    6. Building Recovery Support    7. Developing Assertive Communication Skills 11/23/20; 11/25/20;    8. Relapse Prevention 11/30/20; 12/2/20; 12/4/20;    9. Relationships/Boundaries 12/7/20; 12/9/20; 12/11/20;   10. Grief and Loss 12/14/20; 12/16/20; 12/18/20;    11. Strengths 12/21/20; 12/23/20;   12. Change 1/25/21; 1/29/21;
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
## 2021-06-14 NOTE — PROGRESS NOTES
Telemedicine Visit: The patient's condition can be safely assessed and treated via synchronous audio and visual telemedicine encounter.      Reason for Telemedicine Visit: Services only offered telehealth    Originating Site (Patient Location): Patient's home    Distant Site (Provider Location): [facility]: [facility]    Consent:  The patient/guardian has verbally consented to: the potential risks and benefits of telemedicine (video visit) versus in person care; bill my insurance or make self-payment for services provided; and responsibility for payment of non-covered services.     Mode of Communication:  Video Conference via Zoom    Call Started at: 09:30 AM  Call Ended at: 12:03 PM    As the provider I attest to compliance with applicable laws and regulations related to telemedicine.

## 2021-06-14 NOTE — PROGRESS NOTES
Rishi Miramontes attended 3 hours of group today.     The group topic was Addiction 101, patient was responsive to topic.     Patient's engagement in the group session: medium     Total group size: 7      YAW Hwang, [facility]  2/1/2021, 2:36 PM

## 2021-06-14 NOTE — PROGRESS NOTES
Rishi Miramontes attended 3 hours of group today.     The group topic was Thinking, patient was responsive to topic.     Patient's engagement in the group session: medium     Total group size: 4      YAW Hwang, [facility]  1/20/2021, 12:05 PM

## 2021-06-15 NOTE — PROGRESS NOTES
Rishi Miramontes attended 3 hours of group today.     The group topic was Relapse Prevention, patient was responsive to topic.     Patient's engagement in the group session: high     Total group size: 5      YAW Hwang, [facility]  2/12/2021, 12:55 PM

## 2021-06-15 NOTE — PROGRESS NOTES
"Addiction  Nurse Only Visit Note    1/24/2018  Date of last visit: 12.27.2017    Reason for today's visit:   Chief Complaint   Patient presents with     Follow-up       Vitals:    01/24/18 0932   Weight: 181 lb (82.1 kg)   Height: 5' 9\" (1.753 m)        If having pain, who will address pain:  Pain is managed with Suboxone and gabapentin    Is pt assisted: No    Urine for toxicology sent today: yes    Last UA date: 12.27.2017  Reviewed with patient: yes  Results: Negative    AIMS: NA    Pill count: No  (Controlled substance only)  Medications needing renewal: Suboxone and gabapentin    Substance use history:    Using alcohol:No  Using street drugs or unprescribed medications: No  Using opioids other that Suboxone:No  Using tobacco:Yes: Describe: E cigarette supplemented with some cigarettes    Recovery environment:  Attending formal chemical dependency programming: No  Attending \"outside\" mutual help meetings: No  Has a chemical dependency sponsor: No  Has other elements of structure: Yes: Describe: Working too many hours for meetings  Current Living Situation: Alone    Cravings: no    Sleep: yes  - Sleeps well related to working 50 hours per week    Depression: yes periodically spikes related to mother passing 4 months ago and no    Anxiety: no    Panic Attacks: no    Paranoia: no      Hallucinations: no      Suicidal Ideation: no    Homicidal Ideation:no  Comments: Has a lot of anger and wanting to beat people, mostly verbalized behaviors    Physical Problems: yes  - pain and stiffness in both knees, reports needing replacement        Discharge Instructions:    Continue Medications as ordered.  No alcohol or unprescribed drug use.  No driving, if sedated.  Come to the Emergency Room if not feeling safe.  Call the clinic with any questions [phone].  If you identify that you are pregnant, please call us to inform us, as medications may need to be changed.  You can also contact Urgent Care Crisis Line at " [phone] 24 hours a day for help.  Follow up as directed, for your appointments, per your After Visit Summary Form.          Lisa Neptali    2018 9:33 AM      [facility] [place] Date: 18  Query Report Page#: 1  Patient Rx History Report  AMANUEL VARGHESE  Search Criteria: Last Name 'amanuel' and First Name 'yudith' and  =  and Request Period = '10/26/17'  to ' - 4 out of 4 Recipients Selected.  Fill Date Product, Str, Form Qty Days Pt ID Prescriber Written RX# N/R* Pharm **MED+  ---------- -------------------------------- ------ ---- --------- ---------- ---------- ------------ ----- --------- ------  2018 SUBOXONE 8 MG-2 MG SL FILM 14.00 7 [id number] 2017 [id number] R [id number] 480.0  2017 SUBOXONE 8 MG-2 MG SL FILM 30.00 15 [id number] 2017 [id number] N [id number] 480.0  2017 GABAPENTIN 100 MG CAPSULE 240.00 30 [id number] 2017 [id number] N [id number] 00.0  2017 SUBOXONE 8 MG-2 MG SL FILM 42.00 21 [id number] 2017 [id number] N [id number] 480.0  2017 GABAPENTIN 100 MG CAPSULE 180.00 30 [id number] 2017 [id number] N [id number] 00.0  2017 SUBOXONE 8 MG-2 MG SL FILM 30.00 15 [id number] 2017 [id number] R [id number] 480.0  11/15/2017 GABAPENTIN 100 MG CAPSULE 180.00 30 [id number] 2017 [id number] N [id number] 00.0  2017 SUBOXONE 8 MG-2 MG SL FILM 30.00 15 [id number] 2017 [id number] N [id number] 480.0  2017 GABAPENTIN 100 MG CAPSULE 60.00 10 [id number] 10/06/2017 [id number] N [id number] 00.0  *N/R N=New R=Refill  +MED Daily  Prescribers for prescriptions listed  ----------------------------------------------------------------------------------------------------------------------------------  [id number] YUDITH GUEVARA MD; [address], [place] [address]  Pharmacies that dispensed prescriptions  listed  ----------------------------------------------------------------------------------------------------------------------------------  [id number] [facility]; : [facility] # [id number], [address] [facility] [address],  Patients that match search criteria  ----------------------------------------------------------------------------------------------------------------------------------  [id number] HAYDEN VARGHESE, [facility]; [address] [place] [address]  [id number] HAYDEN VARGHESE [facility]; [address] [place] [address]  [id number] HAYDEN VARGHESE [facility]; [address] [place] [address]  [id number] HAYDEN VARGHESE [facility]; [address] A [address], [place] [address]  MED Summary  This section displays cumulative MED values by unique recipient. The MED Max value is the maximum occurrence of cumulative MED  sustained for any 3 consecutive days. This value is calculated based on prescriptions dispensed during the date range requested.  -----------------------------------------------------------------------------------------------------------------------------------  0 HALIMA BLAKE; 1950; [address], [place] [address]  480 HALIMA BLAKE; 1950; [address], [place] [address]  **Per CDC guidance, the conversion factors and associated daily morphine milligram equivalents for drugs prescribed as part of  medication-assisted treatment for opioid use disorder should not be used to benchmark against dosage thresholds meant for opioids  prescribed for pain.  Report Disclaimers:  The report provided above is based upon the search criteria and the data provided by the dispensing entities. For more information  about any prescription, please contact the dispenser or the prescriber.  This report contains confidential information, including patient identifiers, and is not a public record. The information on  this  report must be treated as protected health information and is to be disclosed to others only as authorized by applicable state  and Federal regulations.

## 2021-06-15 NOTE — PROGRESS NOTES
Rishi Miramontes attended 3 hours of group today.     The group topic was Relapse Prevention, patient was responsive to topic.     Patient's engagement in the group session: medium     Total group size: 6      YAW Hwang, [facility]  2/8/2021, 12:07 PM

## 2021-06-15 NOTE — PROGRESS NOTES
Rishi Miramontes attended 3 hours of group today.     The group topic was Addiction 101, patient was responsive to topic.     Patient's engagement in the group session: high     Total group size: 6      YAW Hwang, [facility]  2/5/2021, 12:09 PM

## 2021-06-15 NOTE — PROGRESS NOTES
Correct pharmacy verified with patient and confirmed in snapshot? [x] yes []no    Charge captured ? [x] yes  [] no    Medications Phoned  to Pharmacy [] yes [x]no  Name of Pharmacist:  List Medications, including dose, quantity and instructions      Medication Prescriptions given to patient   [] yes  [x] no   List the name of the drug the prescription was written for.       Medications ordered this visit were e-scribed.  Verified by order class [x] yes  [] no  Gabapentin 100 mg  Suboxone 8-2 mg    Medication changes or discontinuations were communicated to patient's pharmacy: [] yes  [] no    UA collected [] yes  [] no    [place] Prescription Monitoring Program Reviewed? [] yes  [] no    Referrals were made to:      Future appointment was made: [] yes  [] no    Dictation completed at time of chart check: [] yes  [] no    I have checked the documentation for today s encounters and the above information has been reviewed and completed.

## 2021-06-15 NOTE — PROGRESS NOTES
Weekly Progress Note    Week of 2/8/20-2/14/20  Rishi Miramontes  1950  [id number]      D) Pt attended 2 groups this week with 0 absences. Patient attended 0 individual sessions this week. A) Staff facilitated groups and reviewed tx progress. Assessed for [facility]. R) No VAP needed at this time.     Any significant events, defines as events that impact patients relationship with others inside and outside of treatment No  Indicate any changes or monitoring of physical or mental health problems No    Indicate involvement by any outside supports No. Pt declines attending support groups or individual psychotherapy at this time.    IAPP reviewed and modified as needed. NA  Pt working on the following dimensions:    Dimension #1 - Withdrawal Potential - Risk 1. Pt stable on Suboxone. No other concerns.   Specific goals from treatment plan addressed this week:  Patient to maintain abstinence throughout outpatient treatment.   Effectiveness of strategies:  Strategies appear to be effective.     Dimension #2 - Biomedical - Risk 1. No current concerns. He notes fatigue from his new job.  Specific goals from treatment plan addressed this week:  Patient to maintain stable health throughout outpatient treatment.   Effectiveness of strategies:  Strategies appear to be effective.    Dimension #3 - Emotional/Behavioral/Cognitive - Risk 2. Rishi reports continuing anxiety and seeming anger towards his former wife. He appears to have difficulty staying present with uncomfortable emotions. He appears to be somewhat isolated. He is declining to consider transcendental meditation at this time. He notes continuing fatigue from his new job.  Active interventions to stabilize mental health symptoms:  Rishi reports playing music daily.  Specific goals from treatment plan addressed this week:  Establish and maintain mental and emotional health.  Effectiveness of strategies:  Strategies appear to be questionable.    Dimension #4 - Treatment  Acceptance/Resistance - Risk 3. Rishi has actively participated in treatment groups. He appears to be doing no recovery-specific activities outside of attending treatment groups.     Specific goals from treatment plan addressed this week:  Patient insists his job search constitutes daily recovery activity.  Effectiveness of strategies:  Strategies appear to be questionable at this time.    Dimension #5 - Relapse Potential - Risk 2. Rishi denies any alcohol use over the past week. He appears to have limited interventions and presents as somewhat isolated. He appears to be somewhat isolated and has expressed resistance to engaging a larger recovery community at this time. He appears to be doing no recovery-specific activities outside of attending treatment groups.     Specific goals from treatment plan addressed this week:  Develop and utilize practical, effective relapse-prevention strategies and tools.   Effectiveness of strategies:  Strategies appear to be questionable.    Dimension #6 - Recovery Environment - Risk 2. Rishi presents as somewhat isolated.He has declined a referral for individual psychotherapy at this time. He has reportedly begun a new job at [employer]. He notes fatigue from his new job. It remains unclear what, if any, specific relapse-prevention tools Rishi is utilizing.   Specific goals from treatment plan addressed this week:  Establish and engage a widespread, redundant recovery support network.   Effectiveness of strategies:  Strategies appear to be questionable.    T) Treatment plan updated No.  Patient notified and in agreement NA    Patient educated on Relapse Prevention. Patient has completed 85 program hours at this time.     Projected discharge date is 5/18/21. Current discharge plan is PENDING.     Ancelmo Zaidi, MALINI, [facility], [facility][note 2]  2/12/2021, 1:23 PM      Weekly Educational Topics Date   1. Understanding Dual Diagnoses, week 1 12/28/20; 12/30/20;    2. Understanding Dual Diagnoses, week 2  1/4/21; 1/6/21; 1/8/21;    3. Stress Management    4. Feelings/Emotions 1/11/21; 1/15/21   5. Thinking 1/18/21; 1/20/21; 1/22/21;    6. Building Recovery Support    7. Developing Assertive Communication Skills 11/23/20; 11/25/20;    8. Relapse Prevention 11/30/20; 12/2/20; 12/4/20; 2/8/21; 2/12/21;   9. Relationships/Boundaries 12/7/20; 12/9/20; 12/11/20;   10. Grief and Loss 12/14/20; 12/16/20; 12/18/20;    11. Strengths 12/21/20; 12/23/20;   12. Change 1/25/21; 1/29/21;    13. Addiction 101 2/1/21; 2/5/21;

## 2021-06-15 NOTE — PROGRESS NOTES
Telemedicine Visit: The patient's condition can be safely assessed and treated via synchronous audio and visual telemedicine encounter.      Reason for Telemedicine Visit: Services only offered telehealth    Originating Site (Patient Location): Patient's home    Distant Site (Provider Location): Provider Remote Setting- Home Office    Consent:  The patient/guardian has verbally consented to: the potential risks and benefits of telemedicine (video visit) versus in person care; bill my insurance or make self-payment for services provided; and responsibility for payment of non-covered services.     Mode of Communication:  Video Conference via Zoom    Call Started at: 9:30 AM  Call Ended at: 12:15 PM    As the provider I attest to compliance with applicable laws and regulations related to telemedicine.

## 2021-06-15 NOTE — PROGRESS NOTES
Weekly Progress Note    Week of 2/1/20-2/7/20  Rishi Miramontes  1950  [id number]      D) Pt attended 2 groups this week with 0 absences. Patient attended 0 individual sessions this week. A) Staff facilitated groups and reviewed tx progress. Assessed for [facility]. R) No VAP needed at this time.     Any significant events, defines as events that impact patients relationship with others inside and outside of treatment No  Indicate any changes or monitoring of physical or mental health problems No    Indicate involvement by any outside supports No. Pt declines attending support groups or individual psychotherapy at this time.    IAPP reviewed and modified as needed. NA  Pt working on the following dimensions:    Dimension #1 - Withdrawal Potential - Risk 1. Pt stable on Suboxone. No other concerns.   Specific goals from treatment plan addressed this week:  Patient to maintain abstinence throughout outpatient treatment.   Effectiveness of strategies:  Strategies appear to be effective.     Dimension #2 - Biomedical - Risk 1. No current concerns. He notes fatigue from his new job.  Specific goals from treatment plan addressed this week:  Patient to maintain stable health throughout outpatient treatment.   Effectiveness of strategies:  Strategies appear to be effective.    Dimension #3 - Emotional/Behavioral/Cognitive - Risk 2. Rishi reports continuing anxiety and seeming anger towards his former wife. He appears to have difficulty staying present with uncomfortable emotions. He appears to be somewhat isolated. He is declining to consider transcendental meditation at this time. He notes continuing fatigue from his new job.  Active interventions to stabilize mental health symptoms:  Rishi reports playing music daily.  Specific goals from treatment plan addressed this week:  Establish and maintain mental and emotional health.  Effectiveness of strategies:  Strategies appear to be questionable.    Dimension #4 - Treatment  Acceptance/Resistance - Risk 2. Rishi has actively participated in treatment groups. He has not yet returned to [facility] [employer].    Specific goals from treatment plan addressed this week:  Patient insists his job search constitutes daily recovery activity.  Effectiveness of strategies:  Strategies appear to be questionable at this time.    Dimension #5 - Relapse Potential - Risk 2. Rishi denies any alcohol use over the past week. He appears to have limited interventions and presents as somewhat isolated. He appears to be somewhat isolated and has expressed resistance to engaging a larger recovery community at this time, though he did attend a [facility] [employer] meeting online a few weeks ago though has not yet returned. He also planned to resume Transcendental Meditation which has been helpful in the past though is now declining to pursue this.  Specific goals from treatment plan addressed this week:  Develop and utilize practical, effective relapse-prevention strategies and tools.   Effectiveness of strategies:  Strategies appear to be questionable.    Dimension #6 - Recovery Environment - Risk 2. Rishi presents as somewhat isolated.He has declined a referral for individual psychotherapy at this time. He has reportedly begun a new job at [employer]. He notes fatigue from his new job. It remains unclear what, if any, specific relapse-prevention tools Rishi is utilizing.   Specific goals from treatment plan addressed this week:  Establish and engage a widespread, redundant recovery support network.   Effectiveness of strategies:  Strategies appear to be questionable.    T) Treatment plan updated No.  Patient notified and in agreement NA    Patient educated on Addiction 101. Patient has completed 79 program hours at this time.     Projected discharge date is 5/18/21. Current discharge plan is PENDING.     Ancelmo Zaidi, MALINI, [facility], [facility]  2/5/2021, 12:47 PM      Weekly Educational Topics Date   1. Understanding Dual Diagnoses,  week 1 12/28/20; 12/30/20;    2. Understanding Dual Diagnoses, week 2 1/4/21; 1/6/21; 1/8/21;    3. Stress Management    4. Feelings/Emotions 1/11/21; 1/15/21   5. Thinking 1/18/21; 1/20/21; 1/22/21;    6. Building Recovery Support    7. Developing Assertive Communication Skills 11/23/20; 11/25/20;    8. Relapse Prevention 11/30/20; 12/2/20; 12/4/20;    9. Relationships/Boundaries 12/7/20; 12/9/20; 12/11/20;   10. Grief and Loss 12/14/20; 12/16/20; 12/18/20;    11. Strengths 12/21/20; 12/23/20;   12. Change 1/25/21; 1/29/21;    13. Addiction 101 2/1/21; 2/5/21;

## 2021-06-15 NOTE — PROGRESS NOTES
Weekly Progress Note    Week of 2/15/20-2/21/20  Rishi Miramontes  1950  [id number]      D) Pt attended 2 groups this week with 0 absences. Patient attended 0 individual sessions this week. A) Staff facilitated groups and reviewed tx progress. Assessed for [facility]. R) No VAP needed at this time.     Any significant events, defines as events that impact patients relationship with others inside and outside of treatment No  Indicate any changes or monitoring of physical or mental health problems No    Indicate involvement by any outside supports No. Pt declines attending support groups or individual psychotherapy at this time.    IAPP reviewed and modified as needed. NA  Pt working on the following dimensions:    Dimension #1 - Withdrawal Potential - Risk 1. Pt stable on Suboxone. No other concerns.   Specific goals from treatment plan addressed this week:  Patient to maintain abstinence throughout outpatient treatment.   Effectiveness of strategies:  Strategies appear to be effective.     Dimension #2 - Biomedical - Risk 1. No current concerns. He notes fatigue from his new job.  Specific goals from treatment plan addressed this week:  Patient to maintain stable health throughout outpatient treatment.   Effectiveness of strategies:  Strategies appear to be effective.    Dimension #3 - Emotional/Behavioral/Cognitive - Risk 2. Rishi reports difficulty staying present with uncomfortable emotions. He appears to be somewhat isolated though noted attending an AA meeting with his son. He is declining to consider transcendental meditation at this time. He notes continuing fatigue from his new job. He is not interested in pursuing individual psychotherapy at this time.   Active interventions to stabilize mental health symptoms:  Rishi reports playing music daily.  Specific goals from treatment plan addressed this week:  Establish and maintain mental and emotional health.  Effectiveness of strategies:  Strategies appear to be  questionable.    Dimension #4 - Treatment Acceptance/Resistance - Risk 2. Rishi has actively participated in treatment groups. He attended an AA meeting with his son and indicated he plans to attend a variety of recovery support group meetings weekly now that he is completing treatment.     Specific goals from treatment plan addressed this week:  Patient insists his job search constitutes daily recovery activity.  Effectiveness of strategies:  Strategies appear to be questionable at this time.    Dimension #5 - Relapse Potential - Risk 2. Rishi denies any alcohol use over the past week. He appears to have limited interventions and presents as somewhat isolated. He appears to be somewhat isolated and has expressed resistance to engaging a larger recovery community though did attend an AA meeting with his son. He appears to be doing no recovery-specific activities outside of attending treatment groups though has committed to begin attending recovery support groups now that he is completing treatment.      Specific goals from treatment plan addressed this week:  Develop and utilize practical, effective relapse-prevention strategies and tools.   Effectiveness of strategies:  Strategies appear to be questionable.    Dimension #6 - Recovery Environment - Risk 2. Rishi presents as somewhat isolated.He has declined a referral for individual psychotherapy at this time. He continues working at [employer]. He notes fatigue from his job. It remains unclear what, if any, specific relapse-prevention tools Rishi is utilizing although he did attend an AA meeting with his son on 2/18 and indicated he will increase support group attendance now that he has completed group.   Specific goals from treatment plan addressed this week:  Establish and engage a widespread, redundant recovery support network.   Effectiveness of strategies:  Strategies appear to be questionable.    T) Treatment plan updated No.  Patient notified and in agreement  NA    Patient educated on Recovery Support. Patient has completed 91 program hours at this time.     Projected discharge date is 5/18/21. Current discharge plan is: Continue MAT; Begin recovery support group attendance.     MALINI Aden, [facility], [facility]  2/19/2021, 12:17 PM      Weekly Educational Topics Date   1. Understanding Dual Diagnoses, week 1 12/28/20; 12/30/20;    2. Understanding Dual Diagnoses, week 2 1/4/21; 1/6/21; 1/8/21;    3. Stress Management    4. Feelings/Emotions 1/11/21; 1/15/21   5. Thinking 1/18/21; 1/20/21; 1/22/21;    6. Building Recovery Support 2/15/21; 2/19/21;    7. Developing Assertive Communication Skills 11/23/20; 11/25/20;    8. Relapse Prevention 11/30/20; 12/2/20; 12/4/20; 2/8/21; 2/12/21;   9. Relationships/Boundaries 12/7/20; 12/9/20; 12/11/20;   10. Grief and Loss 12/14/20; 12/16/20; 12/18/20;    11. Strengths 12/21/20; 12/23/20;   12. Change 1/25/21; 1/29/21;    13. Addiction 101 2/1/21; 2/5/21;

## 2021-06-15 NOTE — PROGRESS NOTES
Telemedicine Visit: The patient's condition can be safely assessed and treated via synchronous audio and visual telemedicine encounter.      Reason for Telemedicine Visit: Services only offered telehealth    Originating Site (Patient Location): Patient's home    Distant Site (Provider Location): Provider Remote Setting- Home Office    Consent:  The patient/guardian has verbally consented to: the potential risks and benefits of telemedicine (video visit) versus in person care; bill my insurance or make self-payment for services provided; and responsibility for payment of non-covered services.     Mode of Communication:  Video Conference via Zoom    Call Started at: 9:30 AM  Call Ended at: 12:15 AM    As the provider I attest to compliance with applicable laws and regulations related to telemedicine.

## 2021-06-15 NOTE — PROGRESS NOTES
Rishi Miramontes attended 3 hours of group today.     The group topic was Recovery Support, patient was responsive to topic.     Patient's engagement in the group session: medium     Total group size: 6    Pt completed treatment on this date.    YAW Hwang, [facility]  2/19/2021, 11:55 AM

## 2021-06-15 NOTE — PROGRESS NOTES
Patient here today for follow up of medication management. States he has maintained sobriety. Denies depression, denies SI/HI, denies anxiety.

## 2021-06-15 NOTE — PROGRESS NOTES
Met briefly with Pt after treatment group to review progress and make plan moving forward. Pt will complete 90 hours of treatment next week. Counselor shared concern that Pt has been using the social aspects of treatment as his primary goals while discounting other recovery activities. Counselor indicated Pt would be permitted to continue into Phase III given the lack of Medicare CD resources available. Pt appeared to grade his treatment progress primarily on his minimal alcohol use and appeared to minimize the process of developing a viable, sustainable recovery support network. He indicated that due to his current work schedule he will not participate in Phase III and so will complete treatment [facility] next week on 2/19/21.

## 2021-06-15 NOTE — PROGRESS NOTES
Rishi Miramontes attended 3 hours of group today.     The group topic was Recovery Support, patient was responsive to topic.     Patient's engagement in the group session: high     Total group size: 6      YAW Hwang, [facility]  2/15/2021, 12:12 PM

## 2021-06-16 PROBLEM — F10.20 SEVERE ALCOHOL USE DISORDER (H): Status: ACTIVE | Noted: 2019-06-21

## 2021-06-16 NOTE — TELEPHONE ENCOUNTER
Telephone Encounter by Kirsten Gutierrez LPN at 1/8/2020 11:38 AM     Author: Kirsten Gutierrez LPN Service: -- Author Type: Licensed Nurse    Filed: 1/8/2020 11:46 AM Encounter Date: 1/8/2020 Status: Signed    : Kirsten Gutierrez LPN (Licensed Nurse)       Date of Last Office Visit: 12/6/19  Date of Next Office Visit: 1/17/20  No shows since last visit: no  Cancellations since last visit: 1/3/20 - per pt  ED visits since last visit: none    Medication Neurontin 300 mg date last ordered: 11/27/19  Qty: 90  Refills: 0    gabapentin (NEURONTIN) 300 MG capsule 90 capsule 0 11/27/2019  No   Sig: TAKE 1 CAPSULE BY MOUTH EVERY MORNING AND 1-2 CAPSULES BY MOUTH LATER IN THE DAY AS NEEDED.       Lapse in therapy greater than 7 days: no. PRN med  Medication refill request verified as identical to current order: yes  Result of Last DAM, VPA, Li+ Level, CBC, or Carbamazepine Level (at or since last visit): Neg DAM, Pos BUP, ETG >47079/80119 on 12/6/19        []Eligibility - not seen in last year    []Supervision - no future appointment    [x]Compliance: Pos ETG; Cx'd on 1/3/20     []Verification - order discrepancy    []Controlled Medication    []90 - day supply request    [x]Other LPN pending medications    Current Medication list:       acamprosate (CAMPRAL) 333 mg tablet TAKE 2 TABLETS(666 MG) BY MOUTH THREE TIMES DAILY   aspirin 81 MG EC tablet Take 81 mg by mouth daily.   buprenorphine-naloxone (SUBOXONE SL TABLET) 8-2 mg Subl per sublingual tab Place 1 tablet under the tongue 3 (three) times a day.   buPROPion (WELLBUTRIN SR) 150 MG 12 hr tablet Take 2 tablets (300 mg total) by mouth every morning. Do Not Crush   calcium, as carbonate, (TUMS) 200 mg calcium (500 mg) chewable tablet Chew 1 tablet daily as needed for heartburn.   furosemide (LASIX) 20 MG tablet Take 20 mg by mouth daily as needed.   gabapentin (NEURONTIN) 300 MG capsule TAKE 1 CAPSULE BY MOUTH EVERY MORNING AND 1-2 CAPSULES BY  MOUTH LATER IN THE DAY AS NEEDED.   traZODone (DESYREL) 100 MG tablet TAKE 1 TO 1 1/2 TABLETS BY MOUTH EVERY NIGHT 1 HOUR BEFORE BEDTIME   venlafaxine (EFFEXOR XR) 150 MG 24 hr capsule Take 1 capsule (150 mg total) by mouth daily.         Medication Plan of Care at last office visit with MD/CNP:    PLAN:  Diagnoses/Plan:  1. Patient to continue on buprenorphine-naloxone 8/2 mg SL tablet three times a day, and a 30-day supply was given. Recommended strongly against drinking beverage alcohol and we discussed at length the danger of combining this with the use of suboxone given the risk of sedation and breathing issues.  Patient recommended to restart acamprosate 666 mg po three times a day, which he reports having from before.  3. Continue to take effexor on a daily basis, 150 mg po daily. Monitor mood closely.   4. Patient strongly encouraged to add attending 12 step groups to his program. He was also recommended to consider attending a cd assessment to obtain an evaluation and referral to return to residential treatment.   5. Patient urine toxicology reveals etg/ets <100/100; DAM negative  6. Patient applauded on his ability to cease use of nicotine. He reported that he is breathing much better and does not feel he needs any additional assistance in smoking cessation at this time.   7. Patient to rtc in 1 month for follow-up and sooner p    [place], [place], [place], and [place] :Reviewed and no worrisome pharmacy activity noted

## 2021-06-16 NOTE — TELEPHONE ENCOUNTER
Telephone Encounter by Kirsten Gutierrez LPN at 10/28/2019 12:14 PM     Author: Kirsten Gutierrez LPN Service: -- Author Type: Licensed Nurse    Filed: 10/28/2019 12:30 PM Encounter Date: 10/27/2019 Status: Signed    : Kirsten Gutierrez LPN (Licensed Nurse)       Date of Last Office Visit: 10/4/09  Date of Next Office Visit: 11/1/19  No shows since last visit: no  Cancellations since last visit: no  ED visits since last visit: no    Medication Both date last ordered: 9/13/19  Qty: 1 mos  Refills: 0    Lapse in therapy greater than 7 days: no  Medication refill request verified as identical to current order: yes  Result of Last DAM, VPA, Li+ Level, CBC, or Carbamazepine Level (at or since last visit): Neg DAM on 10/04/19. ETG : see results below            []Eligibility - not seen in last year    []Supervision - no future appointment    []Compliance   []Verification - order discrepancy    []Controlled Medication    []90 - day supply request    [x]Other LPN pending medications    Current Medication list:      acamprosate (CAMPRAL) 333 mg tablet TAKE 2 TABLETS(666 MG) BY MOUTH THREE TIMES DAILY   aspirin 81 MG EC tablet Take 81 mg by mouth daily.   buprenorphine-naloxone (SUBOXONE SL TABLET) 8-2 mg Subl per sublingual tab Place 1 tablet under the tongue 3 (three) times a day.   buPROPion (WELLBUTRIN SR) 150 MG 12 hr tablet TAKE 1 TABLET BY MOUTH EVERY MORNING FOR 1 WEEK, THEN 2 TABLETS BY MOUTH EVERY MORNING THEREAFTER   calcium, as carbonate, (TUMS) 200 mg calcium (500 mg) chewable tablet Chew 1 tablet daily as needed for heartburn.   furosemide (LASIX) 20 MG tablet Take 20 mg by mouth daily as needed.   gabapentin (NEURONTIN) 300 MG capsule TAKE 1 CAPSULE BY MOUTH EVERY MORNING AND 1-2 CAPSULES BY MOUTH LATER IN THE DAY AS NEEDED.   traZODone (DESYREL) 100 MG tablet TAKE 1 TO 1 AND 1/2 TABLETS BY MOUTH EVERY NIGHT 1 HOUR BEFORE BEDTIME   varenicline (CHANTIX STARTING MONTH BOX) 0.5 mg  (11)- 1 mg (42) tablet Take one 0.5mg tablet by mouth once daily for 3 days, then one 0.5mg tablet twice daily for 3 days, then a 1mg tablet twice daily.   venlafaxine (EFFEXOR XR) 150 MG 24 hr capsule Take 1 capsule (150 mg total) by mouth daily.       Medication Plan of Care at last office visit with MD/CNP:    PLAN:  Diagnoses/Plan:  1. Patient to continue on buprenorphine-naloxone 8/2 mg SL tablet three times a day, and a 30-day supply was given.   3. Continue effexor daily, 150 mg po daily. Monitor mood closely.   4. Patient strongly encouraged to add attending 12 step groups to his program. He reported he will consider this prior to his next appointment  5. Patient urine toxicology reveals etg/ets <100/100; DAM negative  6. Patient advised on smoking cessation. Precontemplative.   7. Patient to rtc in 1 month for follow-up and sooner prn.     [place], [place], [place], and [place] :Reviewed and no worrisome pharmacy activity noted

## 2021-06-16 NOTE — PROGRESS NOTES
Patient here today for follow up of medication management. States he has maintained sobriety. States depression 0.5/5 denies SI/HI, anxiety 1/5. States sleep is great.

## 2021-06-16 NOTE — TELEPHONE ENCOUNTER
Telephone Encounter by Yin Mcgregor RN at 8/20/2019  1:13 PM     Author: Yin Mcgregor RN Service: Psychiatry Author Type: Registered Nurse    Filed: 8/20/2019  1:20 PM Encounter Date: 8/20/2019 Status: Signed    : Yin Mcgregor RN (Registered Nurse)       Date of Last Office Visit: 07/26/2019  Date of Next Office Visit: 08/23/2019  No shows since last visit: none  Cancellations since last visit: none  ED visits since last visit:  None  Medication Gabapentin 300 mg date last ordered: 06/11/2019  Qty: 90  Refills: 0  Lapse in therapy greater than 7 days: prn  Medication refill request verified as identical to current order: yes  Result of Last DAM, VPA, Li+ Level, CBC, or Carbamazepine Level (at or since last visit): N/A     [] Medication refilled per [facility] M-1.   [x] Medication unable to be refilled by RN due to criteria not met as indicated below:     []Eligibility - not seen in last year    []Supervision - no future appointment    []Compliance     []Verification - order discrepancy    []Controlled Medication    []Medication not included in RN Protocol    []90 - day supply request    [x]Other     Current Medication list:    Rishi Miramontes    (MRN [id number])   Your Current Medications Are     acamprosate (CAMPRAL) 333 mg tablet TAKE 2 TABLETS(666 MG) BY MOUTH THREE TIMES DAILY   aspirin 81 MG EC tablet Take 81 mg by mouth daily.   buprenorphine-naloxone (SUBOXONE SL TABLET) 8-2 mg Subl per sublingual tab Place 1 tablet under the tongue 3 (three) times a day.   calcium, as carbonate, (TUMS) 200 mg calcium (500 mg) chewable tablet Chew 1 tablet daily as needed for heartburn.   furosemide (LASIX) 20 MG tablet Take 20 mg by mouth daily as needed.   gabapentin (NEURONTIN) 300 MG capsule TAKE 1 CAPSULE IN THE MORNING, 1-2 LATER AS NEEDED.   traZODone (DESYREL) 100 MG tablet TAKE 1 TO 1 AND 1/2 TABLETS BY MOUTH EVERY NIGHT 1 HOUR BEFORE BEDTIME   venlafaxine (EFFEXOR XR) 150 MG 24 hr capsule  Take 1 capsule (150 mg total) by mouth daily.       Medication Plan of Care at last office visit with MD/CNP:    Diagnoses/Plan:  1. Patient to continue on buprenorphine-naloxone 8/2 mg SL tablet three times a day, and a 30-day supply was given.   3. Continue effexor daily, 150 mg po daily. Monitor mood closely  4. Patient strongly encouraged to add attending 12 step groups to his program.  5. Patient urine toxicology reveals etg/ets <100/100; DAM negative  6. Patient advised on smoking cessation. Precontemplative.   7. Patient to rtc in 1 month for follow-up and sooner prn.     [place] :

## 2021-06-16 NOTE — TELEPHONE ENCOUNTER
Telephone Encounter by Kirsten Gutierrez LPN at 3/22/2019 10:52 AM     Author: Kirsten Gutierrez LPN Service: -- Author Type: Licensed Nurse    Filed: 3/22/2019 11:02 AM Encounter Date: 3/21/2019 Status: Signed    : Kirsten Gutierrez LPN (Licensed Nurse)       Date of Last Office Visit: 2/19/19  Date of Next Office Visit: 3/26/19  No shows since last visit: 3/18/19 - late Cx - family emergency  Cancellations since last visit: 3/7/18 - provider, 1 mos  ED visits since last visit: no    Medication Gabapentin 300 mg date last ordered: 2/19/19  Qty: 90  Refills: 0    gabapentin (NEURONTIN) 300 MG capsule 90 capsule 0 2/19/2019  No   Sig: TAKE 1 in the morning; 1-2 later prn       Lapse in therapy greater than 7 days: no  Medication refill request verified as identical to current order: yes  Result of Last DAM, VPA, Li+ Level, CBC, or Carbamazepine Level (at or since last visit): Negative DAM on 2/19/19; ETG 8620/6740 on 2/19/19        []Eligibility - not seen in last year    []Supervision - no future appointment    [x]Compliance  - late Cx on 3/18/19  []Verification - order discrepancy    [x]Controlled Medication    []90 - day supply request    [x]Other LPN pending medications    Current Medication list:      aspirin 81 MG EC tablet Take 81 mg by mouth daily.   buprenorphine-naloxone (SUBOXONE SL TABLET) 8-2 mg Subl per sublingual tab Place 1 tablet under the tongue 3 (three) times a day. [facility] # [id number]   cholecalciferol, vitamin D3, (VITAMIN D3) 2,000 unit Tab Take 1 tablet by mouth every evening.   CYANOCOBALAMIN, VITAMIN B-12, (VITAMIN B12 ORAL) Take 1 tablet by mouth 2 (two) times a day.   FERROUS SULFATE, DRIED (IRON, DRIED, ORAL) Take 1 tablet by mouth. About every 3 days.   folic acid (FOLVITE) 1 MG tablet TK 1 T PO QD   furosemide (LASIX) 20 MG tablet Take 20 mg by mouth daily as needed.   gabapentin (NEURONTIN) 300 MG capsule TAKE 1 in the morning; 1-2 later prn   multivitamin  therapeutic (THERAGRAN) tablet Take 1 tablet by mouth daily.   OMEGA-3/DHA/EPA/FISH OIL (FISH OIL-OMEGA-3 FATTY ACIDS) 300-1,000 mg capsule Take 2 g by mouth daily.   traZODone (DESYREL) 100 MG tablet TAKE 1 TO 1 AND 1/2 TABLETS BY MOUTH EVERY NIGHT 1 HOUR BEFORE BEDTIME   UBIDECARENONE (COENZYME Q10) 100 mg Tab tablet Take 100 mg by mouth daily.   venlafaxine (EFFEXOR XR) 150 MG 24 hr capsule Take 1 capsule (150 mg total) by mouth daily.   vitamin E 200 UNIT capsule Take 200 Units by mouth daily.       Medication Plan of Care at last office visit with MD/CNP:    PLAN:   1. Patient to be continued on suboxone 8/2 mg SL three times a day and was given a 30-day supply of this medication. We discussed the danger of combining this with anything that causes sedation, such as alcohol.   2. We reviewed at length danger of drinking beverage alcohol in combination with suboxone, and I strongly recommended against continuing to use this as a method of pain control for knee pain. Patient was open to stopping the use of alcohol, but interested In adjunct medication to help, and has noted gabapentin is helpful for anxiety and cravings in the past. Therefore his dose was increased and he will be prescribed gabepentin to use 1-2 po prn during the day and then before bed to help with sleep.  Baclofen was discontinued, as this did not help him after a weeklong trial.   3. Given his ongoing low mood, the patient dose of effexor XR increased to 150 mg po daily. We reviewed that if needed he can return to his prior dose. Patient encouraged to consider individual psychotherapy and attending a weekly recovery meeting for increased support.   4. Patient urine toxicology reveals DAM negative; etg/ets pending   5. Patient has an appointment with his primary care provider later today, and reports BP measured at home and in other settings have been normal. Stopping the use of beverage alcohol will certainly improve his blood pressure control.    6. 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 We discussed trying to focus on alternate solutions focused on actions he can take where he is living now.   7. Recommended smoking cessation.   8. Patient to rtc in 1 month for close follow-up    [place], [place], and [place] :Reviewed and no worrisome pharmacy activity noted

## 2021-06-16 NOTE — TELEPHONE ENCOUNTER
Telephone Encounter by Tracy Tovar RN at 6/10/2019  2:30 PM     Author: Tracy Tovar RN Service: Behavioral Author Type: Registered Nurse    Filed: 6/10/2019  2:44 PM Encounter Date: 6/9/2019 Status: Signed    : Tracy Tovar RN (Registered Nurse)       Date of Last Office Visit: 5/28/19  Date of Next Office Visit: HUC attempting to schedule  No shows since last visit: 0  Cancellations since last visit: 0  ED visits since last visit:  0    Medication gabapentin 300 mg date last ordered: 5/13/19  Qty: 90  Refills: 0  Medication trazodone 100 mg date last ordered: 5/13/19  Qty: 45  Refills: 0    Lapse in therapy greater than 7 days: No  Medication refill request verified as identical to current order: Yes  Result of Last DAM, VPA, Li+ Level, CBC, or Carbamazepine Level (at or since last visit): N/A     [] Medication refilled per [facility] M-1.   [x] Medication unable to be refilled by RN due to criteria not met as indicated below:     []Eligibility - not seen in last year    [x]Supervision - no future appointment    []Compliance     []Verification - order discrepancy    []Controlled Medication    []Medication not included in RN Protocol    []90 - day supply request    []Other     Current Medication list:    Medication Plan of Care at last office visit with MD/CNP:

## 2021-06-16 NOTE — TELEPHONE ENCOUNTER
Telephone Encounter by Barbara Lorenzo CMA at 7/19/2019 11:23 AM     Author: Barbara Lorenzo CMA Service: -- Author Type: Certified Medical Assistant    Filed: 7/19/2019 11:36 AM Encounter Date: 7/19/2019 Status: Signed    : Barbara Lorenzo CMA (Certified Medical Assistant)       Date of Last Office Visit: 5/28/19  Date of Next Office Visit: 7/26/19  No shows since last visit: zero  Cancellations since last visit: 2 7/18 no reason given; 7/19/19 cx'd due to work conflict  ED visits since last visit:  None Inpatient [facility] 6/21/19 to 7/12/19  Medication buprenorphine-naloxone date last ordered: 5/28/19  Qty: 90  Refills: 0  Lapse in therapy greater than 7 days: yes  Medication refill request verified as identical to current order: yes  Result of Last DAM, VPA, Li+ Level, CBC, or Carbamazepine Level (at or since last visit): 6/21/19 POCT Alcohol breath test 0.10; 5/28/19 DAM Neg; 5/28/19  BUP Pos     [] Medication refilled per [facility] M-1.   [] Medication unable to be refilled by RN due to criteria not met as indicated below:     []Eligibility - not seen in last year    []Supervision - no future appointment    [x]Compliance     []Verification - order discrepancy    [x]Controlled Medication    []Medication not included in RN Protocol    []90 - day supply request    [x]Other CMA pending medications  [place] :     Current Medication list:    Your Current Medications Are     aspirin 81 MG EC tablet Take 81 mg by mouth daily.   buprenorphine-naloxone (SUBOXONE SL TABLET) 8-2 mg Subl per sublingual tab Place 1 tablet under the tongue 3 (three) times a day.   calcium, as carbonate, (TUMS) 200 mg calcium (500 mg) chewable tablet Chew 1 tablet daily as needed for heartburn.   furosemide (LASIX) 20 MG tablet Take 20 mg by mouth daily as needed.   gabapentin (NEURONTIN) 300 MG capsule TAKE 1 CAPSULE IN THE MORNING, 1-2 LATER AS NEEDED.   loratadine (CLARITIN) 10 mg tablet Take 1 tablet (10 mg total) by mouth daily as needed  "for allergies.   pseudoephedrine (SUDAFED) 30 MG tablet Take 1 tablet (30 mg total) by mouth daily as needed for congestion (rhinorrhea).   traZODone (DESYREL) 100 MG tablet TAKE 1 TO 1 AND 1/2 TABLETS BY MOUTH EVERY NIGHT 1 HOUR BEFORE BEDTIME   venlafaxine (EFFEXOR XR) 150 MG 24 hr capsule Take 1 capsule (150 mg total) by mouth daily.   Allergies     No Known Allergies     Medication Plan of Care at last office visit with MD/CNP: 5/28/19 Dr. Brunner  Diagnoses/Plan:  1. Patient to continue on buprenorphine-naloxone 8/2 mg SL tablet three times a day, and a 30-day supply was given. We again reviewed at length the danger of the patient being on suboxone and drinking beverage alcohol.   2. Patient agreed to obtain a Rule 25 and return to inpatient or residential treatment. At this point, he acknowledges he has tried multiple times to stop drinking as an outpatient and feels he will be unable to do so without \"going away somewhere\". He is currently drinking on a daily basis, and as above, is aware of the danger of doing so in combination with suboxone.   3. Patient to continue on effexor 150 mg XR daily- 90 day supply with 1 refill given. He does not present as being at risk of acute risk of self-harm.   4. Patient strongly encouraged to add attending 12 step groups to his program.  5. Patient urine toxicology reveals etg/ets >10,000/10,000; thrombocytopenia and hepatitis with ast>alt identified on patients lab results  6. Patient advised his blood pressure was again elevated beyond goal in clinic. He reports he has been to his PCP, who told him he had a normal blood pressure at that clinic.   7. Patient advised on smoking cessation. Precontemplative.   8. Patient to rtc in 1 month for follow-up and sooner prn.          "

## 2021-06-16 NOTE — TELEPHONE ENCOUNTER
Telephone Encounter by Kirsten Gutierrez LPN at 3/18/2020 12:02 PM     Author: Kirsten Gutierrez LPN Service: -- Author Type: Licensed Nurse    Filed: 3/18/2020 12:12 PM Encounter Date: 3/17/2020 Status: Signed    : Kirsten Gutierrez LPN (Licensed Nurse)       Date of Last Office Visit: 1/31/2020  Date of Next Office Visit: 3/27/2020  No shows since last visit: no  Cancellations since last visit: x 2 per pt  ED visits since last visit: none    Medication Suboxone 8-2 mg date last ordered: 1/31/2020  Qty: 90  Refills: 0 - According to  last filled on 1/31/2020  Medication Trazodone 100 mg date last ordered: 1/6/2020  Qty: 45  Refills: 1      Lapse in therapy greater than 7 days: appears yes   Medication refill request verified as identical to current order: yes  Result of Last DAM, VPA, Li+ Level, CBC, or Carbamazepine Level (at or since last visit): Positive DAM for THC, hernandez ETG on 1/31/2020        []Eligibility - not seen in last year    []Supervision - no future appointment    [x]Compliance : Cx'd x 2; unclear med compliance    []Verification - order discrepancy    [x]Controlled Medication    []90 - day supply request    [x]Other LPN pending medications    Current Medication list:      aspirin 81 MG EC tablet  Take 81 mg by mouth daily.    buprenorphine-naloxone (SUBOXONE SL TABLET) 8-2 mg Subl per sublingual tab  Place 1 tablet under the tongue 3 (three) times a day.    buPROPion (WELLBUTRIN SR) 150 MG 12 hr tablet  Take 2 tablets (300 mg total) by mouth every morning. Do Not Crush    calcium, as carbonate, (TUMS) 200 mg calcium (500 mg) chewable tablet  Chew 1 tablet daily as needed for heartburn.    furosemide (LASIX) 20 MG tablet  Take 20 mg by mouth daily as needed.    gabapentin (NEURONTIN) 600 MG tablet  Take 1 tablet (600 mg total) by mouth 3 (three) times a day.    traZODone (DESYREL) 100 MG tablet  TAKE 1 TO 1 1/2 TABLETS BY MOUTH EVERY NIGHT 1 HOUR BEFORE BEDTIME     venlafaxine (EFFEXOR XR) 150 MG 24 hr capsule  Take 1 capsule (150 mg total) by mouth daily.        Medication Plan of Care at last office visit with MD/CNP:    PLAN:    Diagnoses/Plan:  1. Patient to continue on buprenorphine-naloxone 8/2 mg SL tablet three times a day, and a 30-day supply was given. Strongly applauded the patient for ceasing use of beverage alcohol and I encouraged him to consider attending 12 step meetings.   2. Continue to take effexor on a daily basis, 150 mg po daily. Monitor mood closely. Strongly recommended patient attend 12 step meetings at this time.   3. Patient encouraged to consider attending outpatient CD treatment.   4. Patient urine toxicology reveals a DAM positive for THC; etg/ets is still pending.  5. Again applauded patient for ceasing the use of beverage alcohol.  6. Patient to rtc in 1 month for follow-up and sooner prn.     [place], [place], [place], and [place] :Reviewed and no worrisome pharmacy activity noted

## 2021-06-16 NOTE — TELEPHONE ENCOUNTER
Telephone Encounter by Kirsten Gutierrez LPN at 2/1/2019  8:44 AM     Author: Kirsten Gutierrez LPN Service: -- Author Type: Licensed Nurse    Filed: 2/1/2019  8:59 AM Encounter Date: 1/31/2019 Status: Signed    : Kirsten Gutierrez LPN (Licensed Nurse)       Date of Last Office Visit: 1/10/19  Date of Next Office Visit: 2/4/19  No shows since last visit: none  Cancellations since last visit: 1/28/19 - Late Cx due to weather  ED visits since last visit: none    Medication Trazodone 100 mg  date last ordered: 1/3/19  Qty: 30  Refills: 0      Lapse in therapy greater than 7 days: no  Medication refill request verified as identical to current order: yes, except amount changed to Qty: 45  Result of Last DAM, VPA, Li+ Level, CBC, or Carbamazepine Level (at or since last visit): Negative UTOX on 1/10/19; ETG > 32219 on 1/10/19        []Eligibility - not seen in last year    []Supervision - no future appointment    []Compliance     []Verification - order discrepancy    []Controlled Medication    []90 - day supply request    [x]Other LPN pending medications    Current Medication list:      aspirin 81 MG EC tablet Take 81 mg by mouth daily.   buprenorphine-naloxone (SUBOXONE) 8-2 mg Film per sublingual film TAKE 1 DOSE SUBLINGUAL TWICE DAILY AS DIRECTED   cholecalciferol, vitamin D3, (VITAMIN D3) 2,000 unit Tab Take 1 tablet by mouth every evening.   CYANOCOBALAMIN, VITAMIN B-12, (VITAMIN B12 ORAL) Take 1 tablet by mouth 2 (two) times a day.   FERROUS SULFATE, DRIED (IRON, DRIED, ORAL) Take 1 tablet by mouth. About every 3 days.   folic acid (FOLVITE) 1 MG tablet TK 1 T PO QD   furosemide (LASIX) 20 MG tablet Take 20 mg by mouth daily as needed.   gabapentin (NEURONTIN) 100 MG capsule TAKE 3-4 CAPSULES BY MOUTH AT BEDTIME.   multivitamin therapeutic (THERAGRAN) tablet Take 1 tablet by mouth daily.   OMEGA-3/DHA/EPA/FISH OIL (FISH OIL-OMEGA-3 FATTY ACIDS) 300-1,000 mg capsule Take 2 g by mouth  daily.   RED YEAST RICE ORAL Take by mouth.   traZODone (DESYREL) 100 MG tablet TAKE 1 TO 1 AND 1/2 TABLETS BY MOUTH EVERY NIGHT 1 HOUR BEFORE BEDTIME   UBIDECARENONE (COENZYME Q10) 100 mg Tab tablet Take 100 mg by mouth daily.   venlafaxine (EFFEXOR XR) 75 MG 24 hr capsule Take 1 capsule (75 mg total) by mouth daily.   vitamin E 200 UNIT capsule Take 200 Units by mouth daily.         Medication Plan of Care at last office visit with MD/CNP:    PLAN:    1. Patient given a refill of suboxone at current dose of 8/2 mg two times a day x 30 days.   2. Patient also had effexor XR decreased per his request to 75 mg XR daily. We discussed this would be a better taper strategy than taking 150 mg po xr every other day. At this time he should keep his higher dose medication in his lock box in case his depression worsens as this may develop over a few weeks to months. Patient to call if issues arise. We discussed side effects that may occur with tapering. Continue on gabapentin.  3. Patient encouraged to consider individual psychotherapy and attending a weekly recovery meeting for increased support.  4. Patient urine toxicology reveals etg/ets >10,000; DAM negative.   5. Patient encouraged to address his hypertension with his primary care physician and was asymptomatic today.   6. Patient to rtc in 2 months and sooner prn.     [place] : NA

## 2021-06-16 NOTE — TELEPHONE ENCOUNTER
Telephone Encounter by Luda Montero RN at 11/27/2019  8:00 AM     Author: Luda Montero RN Service: -- Author Type: Registered Nurse    Filed: 11/27/2019  8:31 AM Encounter Date: 11/26/2019 Status: Signed    : Luda Montero RN (Registered Nurse)       Date of Last Office Visit: 10-4-19  Date of Next Office Visit: 12-6-19  No shows since last visit: 1  Cancellations since last visit: 4 ( 1 provider initiated)  ED visits since last visit:  0  Medication Gabapentin 10-28-19 # 90 with 0 refills.  Trazodone 10-28-19 # 45 with 0 refills.   Lapse in therapy greater than 7 days: no  Medication refill request verified as identical to current order: yes  Result of Last DAM, VPA, Li+ Level, CBC, or Carbamazepine Level (at or since last visit): N/A     [] Medication refilled per [facility] M-1.   [x] Medication unable to be refilled by RN due to criteria not met as indicated below:     []Eligibility - not seen in last year    [x]Supervision    []Compliance     []Verification - order discrepancy    []Controlled Medication    []Medication not included in RN Protocol    []90 - day supply request    []Other   Current Medication list:    acamprosate (CAMPRAL) 333 mg tablet TAKE 2 TABLETS(666 MG) BY MOUTH THREE TIMES DAILY   aspirin 81 MG EC tablet Take 81 mg by mouth daily.   buprenorphine-naloxone (SUBOXONE SL TABLET) 8-2 mg Subl per sublingual tab Place 1 tablet under the tongue 3 (three) times a day.   buPROPion (WELLBUTRIN SR) 150 MG 12 hr tablet TAKE 1 TABLET BY MOUTH EVERY MORNING FOR 1 WEEK, THEN 2 TABLETS BY MOUTH EVERY MORNING THEREAFTER   calcium, as carbonate, (TUMS) 200 mg calcium (500 mg) chewable tablet Chew 1 tablet daily as needed for heartburn.   furosemide (LASIX) 20 MG tablet Take 20 mg by mouth daily as needed.   gabapentin (NEURONTIN) 300 MG capsule TAKE 1 CAPSULE BY MOUTH EVERY MORNING AND 1-2 CAPSULES BY MOUTH LATER IN THE DAY AS NEEDED.   traZODone (DESYREL) 100 MG tablet TAKE 1 TO 1 1/2 TABLETS BY MOUTH  EVERY NIGHT 1 HOUR BEFORE BEDTIME   varenicline (CHANTIX STARTING MONTH BOX) 0.5 mg (11)- 1 mg (42) tablet Take one 0.5mg tablet by mouth once daily for 3 days, then one 0.5mg tablet twice daily for 3 days, then a 1mg tablet twice daily.   venlafaxine (EFFEXOR XR) 150 MG 24 hr capsule Take 1 capsule (150 mg total) by mouth daily.       Medication Plan of Care at last office visit with MD/CNP:  1. Patient to continue on buprenorphine-naloxone 8/2 mg SL tablet three times a day, and a 30-day supply was given.   3. Continue effexor daily, 150 mg po daily. Monitor mood closely.   4. Patient strongly encouraged to add attending 12 step groups to his program. He reported he will consider this prior to his next appointment  5. Patient urine toxicology reveals etg/ets <100/100; DAM negative  6. Patient advised on smoking cessation. Precontemplative.   7. Patient to rtc in 1 month for follow-up and sooner prn.

## 2021-06-17 NOTE — TELEPHONE ENCOUNTER
Telephone Encounter by Love Dyer RN at 7/20/2020 10:57 AM     Author: Love Dyer RN Service: -- Author Type: Registered Nurse    Filed: 7/20/2020 11:00 AM Encounter Date: 7/19/2020 Status: Signed    : Love Dyer RN (Registered Nurse)       Date of Last Office Visit: 6/30/20  Date of Next Office Visit: 7/21/20  No shows since last visit: none  Cancellations since last visit: none  ED visits since last visit:  none  Medication gabapentin 600mg date last ordered: 6/16/20  Qty: 90  Refills: 0  Lapse in therapy greater than 7 days: no  Medication refill request verified as identical to current order: yes  Result of Last DAM, VPA, Li+ Level, CBC, or Carbamazepine Level (at or since last visit): N/A     [] Medication refilled per [facility] M-1.   [x] Medication unable to be refilled by RN due to criteria not met as indicated below:     []Eligibility - not seen in last year    []Supervision - no future appointment    []Compliance     []Verification - order discrepancy    []Controlled Medication    []Medication not included in RN Protocol    []90 - day supply request    [x]Other - patietn has an appointment tomorrow   Current Medication list:    aspirin 81 MG EC tablet  Take 81 mg by mouth daily.    buprenorphine-naloxone (SUBOXONE SL TABLET) 8-2 mg Subl per sublingual tab  1 tablet three times a day    buPROPion (WELLBUTRIN SR) 150 MG 12 hr tablet  TAKE 2 TABLETS(300 MG) BY MOUTH EVERY MORNING. DO NOT CRUSH    calcium, as carbonate, (TUMS) 200 mg calcium (500 mg) chewable tablet  Chew 1 tablet daily as needed for heartburn.    furosemide (LASIX) 20 MG tablet  Take 20 mg by mouth daily as needed.    gabapentin (NEURONTIN) 600 MG tablet  Take 1 tablet (600 mg total) by mouth 3 (three) times a day.    traZODone (DESYREL) 100 MG tablet  TAKE 1 TO 1 AND 1/2 TABLETS BY MOUTH EVERY NIGHT 1 HOUR BEFORE BEDTIME    venlafaxine (EFFEXOR-XR) 150 MG 24 hr capsule  TAKE 1 CAPSULE(150 MG) BY MOUTH DAILY        Medication Plan  of Care at last office visit with MD/CNP:    Plan:  1. Patient to continue on buprenorphine-naloxone 8/2 mg SL tablet three times a day, and a 30-day supply was given.  2. Continue to be on effexor 150 mg po daily; continue to monitor his mood.   3. Patient again encouraged to move into sober housing per his plans. Encouraged him to do so as soon as possible.    4. Patient urine toxicology to be deferred given the coronavirus pandemic.   5. Patient to rtc in 1 month and sooner prn.

## 2021-06-17 NOTE — TELEPHONE ENCOUNTER
Telephone Encounter by Love Dyer RN at 10/19/2020 11:49 AM     Author: Love Dyer RN Service: -- Author Type: Registered Nurse    Filed: 10/19/2020 11:51 AM Encounter Date: 10/16/2020 Status: Signed    : Love Dyer RN (Registered Nurse)           Lucrecia Meneses MD  to Eleanor William ? Sho Woodson RN ? Davonte Werner ? Margarita [facility] Addiction Scheduling Pool          10/19/20 11:40 AM  Kya and I discussed. We need a team call for this patient. He does not meet criteria for outpatient and will be referred to [place]. Also he is already seeing Brunner at [facility]. So please hold on the appointment.

## 2021-06-17 NOTE — TELEPHONE ENCOUNTER
Telephone Encounter by Kelly Peralta RN at 5/12/2020  1:26 PM     Author: Kelly Peralta RN Service: Behavioral Author Type: Registered Nurse    Filed: 5/12/2020  1:56 PM Encounter Date: 5/12/2020 Status: Signed    : Kelly Peralta RN (Registered Nurse)       Date of Last Office Visit: 3/27/20  Date of Next Office Visit: 5/22/20  No shows since last visit: none  Cancellations since last visit: none  ED visits since last visit:  none  Medication Gabapentin 600 mg tab date last ordered: 3/30/20  Qty: 90 Refills: 0  Lapse in therapy greater than 7 days: no  Medication refill request verified as identical to current order: yes  Result of Last DAM, VPA, Li+ Level, CBC, or Carbamazepine Level (at or since last visit): 1/31/20 DAM pos THC, ETG/S neg     [x] Medication refilled per [facility] M-1.   [] Medication unable to be refilled by RN due to criteria not met as indicated below:     []Eligibility - not seen in last year    []Supervision - no future appointment    []Compliance     []Verification - order discrepancy    []Controlled Medication    []Medication not included in RN Protocol    []90 - day supply request    []Other     Current Medication list:    aspirin 81 MG EC tablet  Take 81 mg by mouth daily.    buprenorphine-naloxone (SUBOXONE SL TABLET) 8-2 mg Subl per sublingual tab  1 tablet three times a day    buPROPion (WELLBUTRIN SR) 150 MG 12 hr tablet  TAKE 2 TABLETS(300 MG) BY MOUTH EVERY MORNING. DO NOT CRUSH    calcium, as carbonate, (TUMS) 200 mg calcium (500 mg) chewable tablet  Chew 1 tablet daily as needed for heartburn.    furosemide (LASIX) 20 MG tablet  Take 20 mg by mouth daily as needed.    gabapentin (NEURONTIN) 600 MG tablet  TAKE 1 TABLET(600 MG) BY MOUTH THREE TIMES DAILY    traZODone (DESYREL) 100 MG tablet  TAKE 1 TO 1 AND 1/2 TABLETS BY MOUTH EVERY NIGHT 1 HOUR BEFORE BEDTIME    venlafaxine (EFFEXOR XR) 150 MG 24 hr capsule  Take 1 capsule (150 mg total) by mouth daily.         Medication Plan of Care at last office visit with MD/CNP:  Diagnoses/Plan:  1. Patient to continue on buprenorphine-naloxone 8/2 mg SL tablet three times a day, and a 30-day supply was given. Strongly applauded the patient for ceasing use of beverage alcohol and I encouraged him to consider attending 12 step meetings.   2. Continue to take effexor on a daily basis, 150 mg po daily. Monitor mood closely. Strongly recommended patient attend 12 step meetings at this time.   3. Patient encouraged to consider attending outpatient CD treatment.   4. Patient urine toxicology will be deferred at this time given current coronavjr  5. Discussed at length the need for coronavirs distanging guidance bounfr-aei  6. Patient to rtc in 1 month for follow-up and sooner prn.    [place] :

## 2021-06-17 NOTE — TELEPHONE ENCOUNTER
Telephone Encounter by Luda Montero RN at 8/14/2020 12:50 PM     Author: Luda Montero RN Service: -- Author Type: Registered Nurse    Filed: 8/14/2020  1:23 PM Encounter Date: 8/14/2020 Status: Signed    : Luda Montero RN (Registered Nurse)       Patient was seen on 7-21-20 and trazodone 150 mg was ordered.  Please clarify as patient thinks he should be on 200 mg of trazodone.      Medication Changes        gabapentin 600 mg Oral 4 times daily       trazodone HCl 150 MG Use 1 as needed for sleep       Medication

## 2021-06-17 NOTE — PROGRESS NOTES
Patient was seen today in follow-up for opiate dependence. Client has no new concerns today.     Last UDS performed on  18 and was: negative  Last BUP performed on  3/14/18 and was positive    See attached  report below      [employer] [place] Date: 05/10/18  Query Report Page#: 1  Patient Rx History Report  AMANUEL VARGHESE  Search Criteria: Last Name 'amanuel' and First Name 'yudith' and  =  and Request Period =   to '05/10/18' - 4 out of 4 Recipients Selected.  Fill Date Product, Str, Form Qty Days Pt ID Prescriber Written RX# N/R* Pharm **MED+  ---------- -------------------------------- ------ ---- --------- ---------- ---------- ------------ ----- --------- ------  2018 SUBOXONE 8 MG-2 MG SL FILM 60.00 30 [id number] 2018 [id number] N [id number] [facility]  2018 GABAPENTIN 100 MG CAPSULE 240.00 30 [id number] 2018 [id number] N [id number] 00.0  2018 SUBOXONE 8 MG-2 MG SL FILM 60.00 30 [id number] 2018 [id number] N [id number] [facility]  2018 GABAPENTIN 100 MG CAPSULE 240.00 30 [id number] 2018 [id number] N [id number] [facility]  2018 GABAPENTIN 100 MG CAPSULE 240.00 30 [id number] 2018 [id number] N [id number] [facility]  2018 SUBOXONE 8 MG-2 MG SL FILM 60.00 30 [id number] 2018 [id number] N [id number] UN  02/15/2018 SUBOXONE 8 MG-2 MG SL FILM 32.00 16 [id number] 2018 [id number] R [id number] UN  2018 SUBOXONE 8 MG-2 MG SL FILM 14.00 7 [id number] 2018 [id number] R [id number] UNK  *N/R N=New R=Refill  +MED Daily  Prescribers for prescriptions listed  ----------------------------------------------------------------------------------------------------------------------------------  [id number] YUDITH GUEVARA MD; [facility], [address] [place] [address]  [id number] JOSE MUNOZ MD; [facility], [address], [place] [address]  Pharmacies  that dispensed prescriptions listed  ----------------------------------------------------------------------------------------------------------------------------------  [id number] [facility]; : [id number], [address] [facility] [address],  Patients that match search criteria  ----------------------------------------------------------------------------------------------------------------------------------  [id number] HAYDEN VARGHESE, [facility]; [address] [place] [address]  [id number] HAYDEN VARGHESE [facility]; [address], [place] [address]  [id number] [facility] [address] [place] [address]  [id number] HAYDEN VARGHESE [facility]; [address] A [address], [place] [address]  MED Summary  This section displays cumulative MED values by unique recipient. The MED Max value is the maximum occurrence of cumulative MED  sustained for any 3 consecutive days. This value is calculated based on prescriptions dispensed during the date range requested.  -----------------------------------------------------------------------------------------------------------------------------------  0 HALIMA BLAKE; 1950; [address], [place] [address]  **Per CDC guidance, the conversion factors and associated daily morphine milligram equivalents for drugs prescribed as part of  medication-assisted treatment for opioid use disorder should not be used to benchmark against dosage thresholds meant for opioids  prescribed for pain.    Correct pharmacy verified with patient and confirmed in snapshot? [x] yes []no    Charge captured ? [x] yes  [] no    Medications Phoned  to Pharmacy [] yes [x]no  Name of Pharmacist:  List Medications, including dose, quantity and instructions      Medication Prescriptions given to patient   [] yes  [x] no   List the name of the drug the prescription was written for.       Medications ordered this  visit were e-scribed.  Verified by order class [x] yes  [] no  Suboxone ( 5/29/18) and Effexor   Medication changes or discontinuations were communicated to patient's pharmacy: [] yes  [x] no    UA collected [x] yes  [] no    [place] Prescription Monitoring Program Reviewed? [x] yes  [] no    Referrals were made to:  none    Future appointment was made: [] yes  [x] no    Dictation completed at time of chart check: [] yes  [x] no    I have checked the documentation for today s encounters and the above information has been reviewed and completed.

## 2021-06-17 NOTE — PROGRESS NOTES
Patient is a transfer from Dr. Curiel and is  here for follow up and medication management.    DAM  Date: 3/14/18   Results: neg  BUP   Date: 3/14/18   Results: pos.    Pain Level: 4 chronic arthritis  Smoking History: smokes 3-4 cigs a day and vaps    How is sleep?: good    Attending outpatient groups or meetings: no    Working or school: full-time    Concerns today: Patient has pain issues.    [place] :      [employer] [place] Date: 18  Query Report Page#: 1  Patient Rx History Report  AMANUEL VARGHESE  Search Criteria: Last Name 'amanuel' and First Name 'yudith' and  = ' and Request Period = '10/13/17'  to ' - 4 out of 4 Recipients Selected.  Fill Date Product, Str, Form Qty Days Pt ID Prescriber Written RX# N/R* Pharm **MED+  ---------- -------------------------------- ------ ---- --------- ---------- ---------- ------------ ----- --------- ------  2018 SUBOXONE 8 MG-2 MG SL FILM 60.00 30 [id number] 2018 [id number] N [id number] 480.0  2018 GABAPENTIN 100 MG CAPSULE 240.00 30 [id number] 2018 [id number] N [id number] 00.0  2018 GABAPENTIN 100 MG CAPSULE 240.00 30 [id number] 2018 [id number] N [id number] 00.0  2018 SUBOXONE 8 MG-2 MG SL FILM 60.00 30 [id number] 2018 [id number] N [id number] 480.0  02/15/2018 SUBOXONE 8 MG-2 MG SL FILM 32.00 16 [id number] 2018 [id number] R [id number] 480.0  2018 SUBOXONE 8 MG-2 MG SL FILM 14.00 7 [id number] 2018 [id number] R [id number] 480.0  2018 SUBOXONE 8 MG-2 MG SL FILM 14.00 7 [id number] 2018 [id number] N [id number] 480.0  2018 GABAPENTIN 100 MG CAPSULE 240.00 30 [id number] 2018 [id number] N [id number] 00.0  2018 SUBOXONE 8 MG-2 MG SL FILM 16.00 8 [id number] 2017 [id number] R [id number] 480.0  2018 SUBOXONE 8 MG-2 MG SL FILM 14.00 7 [id number] 2017 [id number] R [id number] 480.0  2017  SUBOXONE 8 MG-2 MG SL FILM 30.00 15 [id number] 12/27/2017 [id number] N [id number] 480.0  12/27/2017 GABAPENTIN 100 MG CAPSULE 240.00 30 [id number] 12/27/2017 [id number] N [id number] 00.0  12/09/2017 SUBOXONE 8 MG-2 MG SL FILM 42.00 21 [id number] 12/09/2017 [id number] N [id number] 480.0  12/08/2017 GABAPENTIN 100 MG CAPSULE 180.00 30 [id number] 12/08/2017 [id number] N [id number] 00.0  11/20/2017 SUBOXONE 8 MG-2 MG SL FILM 30.00 15 [id number] 11/06/2017 [id number] R [id number] 480.0  11/15/2017 GABAPENTIN 100 MG CAPSULE 180.00 30 [id number] 11/13/2017 [id number] N [id number] 00.0  11/07/2017 SUBOXONE 8 MG-2 MG SL FILM 30.00 15 [id number] 11/06/2017 [id number] N [id number] 480.0  11/02/2017 GABAPENTIN 100 MG CAPSULE 60.00 10 [id number] 10/06/2017 [id number] N [id number] 00.0  10/19/2017 SUBOXONE 8 MG-2 MG SL FILM 30.00 15 [id number] 10/06/2017 [id number] R [id number] 480.0  *N/R N=New R=Refill  +MED Daily  Prescribers for prescriptions listed  ----------------------------------------------------------------------------------------------------------------------------------  [id number] HALIMA GUEVARA MD; [address] [place] [address] [address], [place] [address]  Pharmacies that dispensed prescriptions listed  ----------------------------------------------------------------------------------------------------------------------------------  [id number] "[facility].; : [facility] [address] [id number], [address] [facility] [address],  [id number] "[facility].; : [facility] [address] [id number], [address] [facility] [address],

## 2021-06-17 NOTE — TELEPHONE ENCOUNTER
Telephone Encounter by Love Dyer RN at 10/19/2020  9:38 AM     Author: Love Dyer RN Service: -- Author Type: Registered Nurse    Filed: 10/19/2020 11:51 AM Encounter Date: 10/16/2020 Status: Signed    : Love Dyer RN (Registered Nurse)       Date of Last Office Visit: 8/18/20  Date of Next Office Visit: None- schedulers will contact  No shows since last visit: none  Cancellations since last visit: none  ED visits since last visit:  None    Medication buprenorphine-naloxone (SUBOXONE SL TABLET) 8-2 mg Subl per sublingual tab  date last ordered: 8/18/20  Qty: 90  Refills: 0  Medication gabapentin 600mg date last ordered: 9/16/20  Qty: 120  Refills: 0    Lapse in therapy greater than 7 days: yes  Medication refill request verified as identical to current order: yes  Result of Last DAM, VPA, Li+ Level, CBC, or Carbamazepine Level (at or since last visit): N/A     [] Medication refilled per [facility] M-1.   [x] Medication unable to be refilled by RN due to criteria not met as indicated below:     []Eligibility - not seen in last year    [x]Supervision - no future appointment    [x]Compliance     []Verification - order discrepancy    [x]Controlled Medication    []Medication not included in RN Protocol    []90 - day supply request    []Other   Current Medication list:    aspirin 81 MG EC tablet Take 81 mg by mouth daily.   buprenorphine-naloxone (SUBOXONE SL TABLET) 8-2 mg Subl per sublingual tab 1 tablet three times a day   buPROPion (WELLBUTRIN SR) 150 MG 12 hr tablet TAKE 2 TABLETS(300 MG) BY MOUTH EVERY MORNING. DO NOT CRUSH   calcium, as carbonate, (TUMS) 200 mg calcium (500 mg) chewable tablet Chew 1 tablet daily as needed for heartburn.   furosemide (LASIX) 20 MG tablet Take 20 mg by mouth daily as needed.   gabapentin (NEURONTIN) 600 MG tablet TAKE 1 TABLET(600 MG) BY MOUTH FOUR TIMES DAILY   traZODone (DESYREL) 100 MG tablet Take 2 tablets (200 mg total) by mouth at bedtime as needed for sleep. TAKE 1  TABLET BY MOUTH AS NEEDED FOR SLEEP.   venlafaxine (EFFEXOR-XR) 150 MG 24 hr capsule TAKE 1 CAPSULE(150 MG) BY MOUTH DAILY       Medication Plan of Care at last office visit with MD/CNP:    Plan:  1. Patient to continue on buprenorphine-naloxone 8/2 mg SL tablet three times a day, and a 30-day supply was given. Reiterated concern for patients  2. I referred Rishi to obtain an MMPI, to assess any history of bipolar disorder or other underlying diagnosis. He will continue on effexor and gabapentin at this time for major depressive disorder.   3. Patient declines offer to start alcohol anticraving medications but reports he is open to attending treatment and moving into sober housing.  4. Patient also requested that I speak with his son, and I directed him to call and complete a release of information.  5. Patient urine toxicology to be deferred given the coronavirus pandemic.   6. Patient to rtc in 1 month and sooner prn.

## 2021-06-17 NOTE — TELEPHONE ENCOUNTER
Telephone Encounter by Love Dyer RN at 5/11/2020  9:31 AM     Author: Love Dyer RN Service: -- Author Type: Registered Nurse    Filed: 5/11/2020  9:36 AM Encounter Date: 5/8/2020 Status: Signed    : Love Dyer RN (Registered Nurse)       Date of Last Office Visit: 3/27/20  Date of Next Office Visit: None - Behavioral Access to attempt to schedule  No shows since last visit: none  Cancellations since last visit: none  ED visits since last visit:  none  Medication gabapentin 600mg  date last ordered: 3/30/20  Qty: 90  Refills: 0  Lapse in therapy greater than 7 days: no - Pharmacy reports that patient  medication on 4/13/20  Medication refill request verified as identical to current order: yes  Result of Last DAM, VPA, Li+ Level, CBC, or Carbamazepine Level (at or since last visit): N/A     [] Medication refilled per [facility] M-1.   [x] Medication unable to be refilled by RN due to criteria not met as indicated below:     []Eligibility - not seen in last year    [x]Supervision - no future appointment    []Compliance     []Verification - order discrepancy    []Controlled Medication    []Medication not included in RN Protocol    []90 - day supply request    []Other   Current Medication list:    aspirin 81 MG EC tablet  Take 81 mg by mouth daily.    buprenorphine-naloxone (SUBOXONE SL TABLET) 8-2 mg Subl per sublingual tab  1 tablet three times a day    buPROPion (WELLBUTRIN SR) 150 MG 12 hr tablet  TAKE 2 TABLETS(300 MG) BY MOUTH EVERY MORNING. DO NOT CRUSH    calcium, as carbonate, (TUMS) 200 mg calcium (500 mg) chewable tablet  Chew 1 tablet daily as needed for heartburn.    furosemide (LASIX) 20 MG tablet  Take 20 mg by mouth daily as needed.    gabapentin (NEURONTIN) 600 MG tablet  TAKE 1 TABLET(600 MG) BY MOUTH THREE TIMES DAILY    traZODone (DESYREL) 100 MG tablet  TAKE 1 TO 1 AND 1/2 TABLETS BY MOUTH EVERY NIGHT 1 HOUR BEFORE BEDTIME    venlafaxine (EFFEXOR XR) 150 MG 24 hr capsule  Take 1  capsule (150 mg total) by mouth daily.        Medication Plan of Care at last office visit with MD/CNP:    Diagnoses/Plan:  1. Patient to continue on buprenorphine-naloxone 8/2 mg SL tablet three times a day, and a 30-day supply was given. Strongly applauded the patient for ceasing use of beverage alcohol and I encouraged him to consider attending 12 step meetings.   2. Continue to take effexor on a daily basis, 150 mg po daily. Monitor mood closely. Strongly recommended patient attend 12 step meetings at this time.   3. Patient encouraged to consider attending outpatient CD treatment.   4. Patient urine toxicology will be deferred at this time given current coronavjr  5. Discussed at length the need for coronavirs distanging guidance bounfr-aei  6. Patient to rtc in 1 month for follow-up and sooner prn.

## 2021-06-17 NOTE — TELEPHONE ENCOUNTER
Telephone Encounter by Barbara Lorenzo CMA at 6/22/2020  9:15 AM     Author: Barbara Lorenzo CMA Service: Psychiatry Author Type: Certified Medical Assistant    Filed: 6/22/2020  9:29 AM Encounter Date: 6/21/2020 Status: Signed    : Barbara Lorenzo CMA (Certified Medical Assistant)       Date of Last Office Visit: 6/16/2020  Date of Next Office Visit: 6/30/2020  No shows since last visit: 0  Cancellations since last visit: 0  ED visits since last visit:  NA  Medication trazodone 100 mg tab date last ordered: 3/19/2020  Qty: 45  Refills: 1  Lapse in therapy greater than 7 days:   No  Medication refill request verified as identical to current order: Yes  Result of Last DAM, VPA, Li+ Level, CBC, or Carbamazepine Level (at or since last visit):   6/15/2020 Blood ETG= 225 /0.23 [facility]   6/15/2020 DRUG Screen= Pos: Buprenorphine    [] Medication refilled per [facility] M-1.   [] Medication unable to be refilled by RN due to criteria not met as indicated below:     []Eligibility - not seen in last year    []Supervision - no future appointment    []Compliance     []Verification - order discrepancy    []Controlled Medication    []Medication not included in RN Protocol    []90 - day supply request    [x]Other  CMA pending medication refill request  [place] :      Current Medication list:   aspirin 81 MG EC tablet  Take 81 mg by mouth daily.    buprenorphine-naloxone (SUBOXONE SL TABLET) 8-2 mg Subl per sublingual tab  1 tablet three times a day    buPROPion (WELLBUTRIN SR) 150 MG 12 hr tablet  TAKE 2 TABLETS(300 MG) BY MOUTH EVERY MORNING. DO NOT CRUSH    calcium, as carbonate, (TUMS) 200 mg calcium (500 mg) chewable tablet  Chew 1 tablet daily as needed for heartburn.    furosemide (LASIX) 20 MG tablet  Take 20 mg by mouth daily as needed.    gabapentin (NEURONTIN) 600 MG tablet  Take 1 tablet (600 mg total) by mouth 3 (three) times a day.    traZODone (DESYREL) 100 MG tablet  TAKE 1 TO 1 AND 1/2 TABLETS BY MOUTH EVERY  NIGHT 1 HOUR BEFORE BEDTIME    venlafaxine (EFFEXOR-XR) 150 MG 24 hr capsule  TAKE 1 CAPSULE(150 MG) BY MOUTH DAILY       Medication Plan of Care at last office visit with MD/CNP:    Plan:  1. Patient to continue on buprenorphine-naloxone 8/2 mg SL tablet three times a day, and a 30-day supply was given. I strongly recommended to patient taking the prescribed amount and not less than prescribed. Strongly applauded the patient for ceasing use of beverage alcohol and I encouraged him to consider attending 12 step meetings.   2. Continue to take effexor on a daily basis, 150 mg po daily. Monitor mood closely. Patient referred to cd team to obtain a re-assessment.   3. Patient encouraged to consider attending outpatient CD treatment. Re-assessment recommended.    4. Patient urine toxicology to be deferred given the coronavirus pandemic.   5. Discussed options for detox and offered to patient the phone number for [facility]. Patient declined interest in this.   6. Patient to rtc in 2 weeks and sooner prn.

## 2021-06-18 NOTE — PROGRESS NOTES
Patient here today for follow up of medication management. Last DAM 5/10/18 positive-THC, BUP 3/14/18 positive.     [facility] [place] Date: 18  Query Report Page#: 1  Patient Rx History Report  AMANUEL VARGHESE  Search Criteria: Last Name 'amanuel' and First Name 'yudith' and  = ' and Request Period = '  to ' - 4 out of 4 Recipients Selected.  Fill Date Product, Str, Form Qty Days Pt ID Prescriber Written RX# N/R* Pharm **MED+  ---------- -------------------------------- ------ ---- --------- ---------- ---------- ------------ ----- --------- ------  2018 SUBOXONE 8 MG-2 MG SL FILM 60.00 30 [id number] 05/10/2018 [id number] N [id number] [facility]  2018 GABAPENTIN 100 MG CAPSULE 240.00 60 [id number] 2018 [id number] N [id number] 00.0  2018 SUBOXONE 8 MG-2 MG SL FILM 60.00 30 [id number] 2018 [id number] N [id number] [facility]  2018 GABAPENTIN 100 MG CAPSULE 240.00 30 [id number] 2018 [id number] N [id number] 00.0  2018 SUBOXONE 8 MG-2 MG SL FILM 60.00 30 [id number] 2018 [id number] N [id number] [facility]  2018 GABAPENTIN 100 MG CAPSULE 240.00 30 [id number] 2018 [id number] N [id number] [facility]  *N/R N=New R=Refill  +MED Daily  Prescribers for prescriptions listed  ----------------------------------------------------------------------------------------------------------------------------------  [id number] YUDITH GUEVARA MD; [facility], [address] [place] [address] [place] [address]  [id number] JOSE MUNOZ MD; [facility], [address], [place] [address]  [id number] DEJON SAENZ MD; [facility] , [facility] [id number] [facility]  Pharmacies that dispensed prescriptions listed  ----------------------------------------------------------------------------------------------------------------------------------  [id number] [employer];  : [facility] # [id number], [address], [facility] [address],  Patients that match search criteria  ----------------------------------------------------------------------------------------------------------------------------------  [id number] HAYDEN VARGHESE, [facility] 50; [address], [place] [address]  [id number] [facility]; [address], [place] [address]  [id number] [facility]; [address], [place] [address]  [id number] HAYDEN [facility] 50; [address] AT A TIME, [place] [address]  MED Summary  This section displays cumulative MED values by unique recipient. The MED Max value is the maximum occurrence of cumulative MED  sustained for any 3 consecutive days. This value is calculated based on prescriptions dispensed during the date range requested.  -----------------------------------------------------------------------------------------------------------------------------------  0 HALIMA BLAKE; 1950; [address] A Time, [place] [address]    Correct pharmacy verified with patient and confirmed in snapshot? [x] yes []no    Charge captured ? [x] yes  [] no    Medications Phoned  to Pharmacy [] yes [x]no  Name of Pharmacist:  List Medications, including dose, quantity and instructions      Medication Prescriptions given to patient   [] yes  [x] no   List the name of the drug the prescription was written for.       Medications ordered this visit were e-scribed.  Verified by order class [x] yes  [] no  Trazodone 100 mg    Medication changes or discontinuations were communicated to patient's pharmacy: [] yes  [] no    UA collected [x] yes  [] no    [place] Prescription Monitoring Program Reviewed? [x] yes  [] no    Referrals were made to:  none    Future appointment was made: [] yes  [x] no    Dictation completed at time of chart check: [] yes  [x] no    I have checked the documentation for today s  encounters and the above information has been reviewed and completed.

## 2021-06-19 NOTE — PROGRESS NOTES
"Addiction  Nurse Only Visit Note    8/16/2018  Date of last visit: 05/31/2018    Reason for today's visit: follow-up  Chief Complaint   Patient presents with     Follow-up       Vitals:    08/16/18 1238   BP: (!) 158/94   Patient Site: Right Arm   Patient Position: Sitting   Cuff Size: Adult Large   Pulse: (!) 102   Resp: 18   Temp: 97.9  F (36.6  C)   TempSrc: Oral   Weight: 179 lb 1.9 oz (81.2 kg)   Height: 5' 9\" (1.753 m)        If having pain, who will address pain:  5 neck and knees, no one treating    Is pt assisted: No    Urine for toxicology sent today: yes    Last UA date: 07/05/2018 at [facility]  Reviewed with patient: yes  Results: negative    AIMS: NA    Pill count: Yes , did not bring with him to count but said he has 6 left.  Medications needing renewal: Buprenorphine-Naloxone    Substance use history:    Using alcohol:No  Using street drugs or unprescribed medications: No  Using opioids other that Suboxone:No  Using tobacco:Yes: Describe:  And e-cigarettes    Recovery environment:  Attending formal chemical dependency programming: No  Attending \"outside\" mutual help meetings: No  Has a chemical dependency sponsor: No  Has other elements of structure: No  Current Living Situation: lives alone    Cravings: yes always opiates, and would rate average 2.5-3 out of 5    Sleep: no    Depression: yes comes and goes, never above moderate.    Anxiety: yes moderate.    Panic Attacks: no    Paranoia: no      Hallucinations: no      Suicidal Ideation: no    Homicidal Ideation:no  Comments: NA    Physical Problems: yes knees and back cause pain, otherwise no other medical issues.    [place]  embeded into encounter.  No worrisome activity or diversion noted.  He was to bring in the Buprenorphine-Naloxone for counting, but he did not bring it in.  He will not be moving to [place].  States too expensive, not the same place it was when he was a kid.  Still struggles some with the cravings, and said if it was not for " the Buprenorphine-Naloxone, he would be using, so thankful for that.  Quiet, flat facially.  Will see Dr. Baum on 2018, and said that works out great as he needs to be down in this area for something else, also.          Education Done Today  Patient Instructions:   Patient Instructions   Continue Medications as ordered.  No alcohol or unprescribed drug use.  No driving, if sedated.  Come to the Emergency Room if not feeling safe.  Call the clinic with any questions [phone].  You can also contact Urgent Care Crisis Line at [phone] 24 hours a day for help.  Follow up as directed, for your appointments, per your After Visit Summary Form.    [facility] [place] Date: 08/15/18  Query Report Page#: 1  Patient Rx History Report  AMANUEL VARGHESE  Search Criteria: Last Name 'amanuel' and First Name 'yudith' and  = ' and Request Period =   to '08/15/18' - 5 out of 5 Recipients Selected.  Fill Date Product, Str, Form Qty Days Pt ID Prescriber Written RX# N/R* Pharm **MED+  ---------- -------------------------------- ------------ ---- --------- ---------- ---------- ------------ ----- --------- ------  2018 SUBOXONE 8 MG-2 MG SL FILM 30.524626 15 [id number] 2018 [id number] N [id number] UNK  2018 SUBOXONE 8 MG-2 MG SL FILM 60.089344 30 [id number] 2018 [id number] N [id number] UNK  2018 GABAPENTIN 100 MG CAPSULE 240.121747 60 [id number] 2018 [id number] N [id number] 00.0  2018 SUBOXONE 8 MG-2 MG SL FILM 60.354061 30 [id number] 05/10/2018 [id number] N [id number] [facility]  2018 GABAPENTIN 100 MG CAPSULE 240.546464 60 [id number] 2018 [id number] N [id number] 00.0  *N/R N=New R=Refill  +MED Daily  Prescribers for prescriptions listed  ----------------------------------------------------------------------------------------------------------------------------------  [id number] DEJON SAENZ MD; [facility], [facility] [id number] [facility]  [id number] JOSE MUNOZ MD; [facility], [address], [place] [address]  [id number] RAHUL BURT; [facility], [address] [facility], [facility] [address]  Pharmacies that dispensed prescriptions listed  ----------------------------------------------------------------------------------------------------------------------------------  [id number] [employer].; : [facility] # [id number], [address] [facility] [address],  [id number] [employer]; : [facility] #[id number], [address] [facility] [address],  Patients that match search criteria  ----------------------------------------------------------------------------------------------------------------------------------  [id number] PAULETTE COLLAZO 50; [address] [place] [address]  [id number] PAULETTE COLLAZO 50; [address], [place] [address]  [id number] PAULETTE COLLAZO 50; [address], [place] [address]  [id number] HAYDEN VARGHESE,  50; [address] AT A TIME, [facility] [address]  [id number] VENANCIOGEOVANIDAVION VARGHESE,  50; [address], [place] [address]  MED Summary  This section displays cumulative MED values by unique recipient. The MED Max value is the maximum occurrence of cumulative MED  sustained for any 3 consecutive days. This value is calculated based on prescriptions dispensed during the date range requested.  -----------------------------------------------------------------------------------------------------------------------------------  0 HALIMA BLAKE; 1950; [address] A Time, [place] [address]  0 HAYDEN HALIMA; 1950; [address] [place] [address]  **Per CDC guidance, the conversion factors and associated daily morphine milligram equivalents for drugs prescribed as part of  medication-assisted  treatment for opioid use disorder should not be used to benchmark against dosage thresholds meant for opioids  prescribed for pain.  Report Disclaimers:  The report provided above is based upon the search criteria and the data provided by the dispensing entities. For more information  about any prescription, please contact the dispenser or the prescriber.  This report contains confidential information, including patient identifiers, and is not a public record. The information on this  report must be treated as protected health information and is to be disclosed to others only as authorized by applicable state  and Federal regulations.          Yin Mcgregor    8/16/2018 12:51 PM

## 2021-06-20 NOTE — LETTER
Letter by Sho Woodson RN at      Author: Sho Woodson RN Service: -- Author Type: --    Filed:  Encounter Date: 7/2/2020 Status: (Other)       July 2, 2020          Rishi Miramontes  [address] [place] [address]      Dear Mr. Miramontes:    Im writing to inform you that Emily Brunner, MD, will be leaving [facility] on August 28, 2020.   We apologize for this disruption in your care and we are committed to supporting your treatment needs.    If you have follow-up appointments after August 28, 2020, we will connect you with another addiction medicine provider within our system.     For medication refills, please contact your pharmacy and they will connect with us to initiate the refill process.  To schedule an appointment with Dr. Brunner before August 28, 2020 please call [facility] Access at [phone].    If you do not plan to return for ongoing medication management at this time, please let us know that as well, so we can note that in your medical record.   Again, we apologize for the inconvenience and look forward to continuing to provide you with the best possible care.    Sincerely,        Electronically signed by Sho Woodson RN, CNP - Clinic Manager  [facility]

## 2021-06-21 NOTE — PROGRESS NOTES
Correct pharmacy verified with patient and confirmed in snapshot? [x] yes []no    Charge captured ? [x] yes  [] no    Medications Phoned  to Pharmacy [] yes [x]no  Name of Pharmacist:  List Medications, including dose, quantity and instructions      Medication Prescriptions given to patient   [] yes  [x] no   List the name of the drug the prescription was written for.       Medications ordered this visit were e-scribed.  Verified by order class [x] yes  [] no  Suboxone 8-2 mg  Effexor 75 mg    Medication changes or discontinuations were communicated to patient's pharmacy: [] yes  [x] no    UA collected [x] yes  [] no    [place] Prescription Monitoring [place] Reviewed? [x] yes  [] no    Referrals were made to: none     Future appointment was made: [x] yes  [] no    Dictation completed at time of chart check: [] yes  [x] no    I have checked the documentation for today s encounters and the above information has been reviewed and completed.

## 2021-06-21 NOTE — PROGRESS NOTES
Correct pharmacy verified with patient and confirmed in snapshot? [x] yes []no    Charge captured ? [x] yes  [] no    Medications Phoned  to Pharmacy [] yes [x]no  Name of Pharmacist:  List Medications, including dose, quantity and instructions      Medication Prescriptions given to patient   [] yes  [x] no   List the name of the drug the prescription was written for.       Medications ordered this visit were e-scribed.  Verified by order class [x] yes  [] no  Gabapentin 100 mg; Suboxone 8-2 mg   Medication changes or discontinuations were communicated to patient's pharmacy: [] yes  [x] no    UA collected [x] yes  [] no    [place] Prescription Monitoring Program Reviewed? [x] yes  [] no    Referrals were made to:  None    Future appointment was made: [x] yes  [] no   01/10/2019  Dictation completed at time of chart check: [] yes  [x] no    I have checked the documentation for today s encounters and the above information has been reviewed and completed.

## 2021-06-21 NOTE — PROGRESS NOTES
[employer] [place] Date: 10/18/18  Query Report Page#: 1  Patient Rx History Report  KULWINDER VARGHESE  Search Criteria: Last Name 'Kulwinder' and First Name 'yudith' and  = ' and Request Period = '  to '10/18/18' - 5 out of 5 Recipients Selected.  Fill Date Product, Str, Form Qty Days Pt ID Prescriber Written RX# N/R* Pharm **MED+  ---------- -------------------------------- ------------ ---- --------- ---------- ---------- ------------ ----- --------- ------  2018 SUBOXONE 8 MG-2 MG SL FILM 30.521629 15 [id number] 2018 [id number] N [id number] [facility]  2018 SUBOXONE 8 MG-2 MG SL FILM 30.552122 15 [id number] 2018 [id number] R [id number] [facility]  2018 GABAPENTIN 100 MG CAPSULE 240.801054 60 [id number] 2018 [id number] N [id number] 00.0  2018 SUBOXONE 8 MG-2 MG SL FILM 30.824400 15 [id number] 2018 [id number] N [id number] [facility]  2018 SUBOXONE 8 MG-2 MG SL FILM 30.854303 15 [id number] 2018 [id number] N [id number] [facility]  *N/R N=New R=Refill  +MED Daily  Prescribers for prescriptions listed  ----------------------------------------------------------------------------------------------------------------------------------  [id number] JOSE MUNOZ MD; [facility], [address], [place] [address]  [id number] RAHUL BURT; [facility], [address] [facility] [place] [address]  [id number] DEJON SAENZ MD; [facility] [id number] [facility]  [id number] BRUNNER, EMILY A MD; [facility], [address] [place] [address] [address], [place] [address]  Pharmacies that dispensed prescriptions listed  ----------------------------------------------------------------------------------------------------------------------------------  [id number] [facility]; : [facility] #[id number], [address] [facility] [place] [address],  Patients that match search  criteria  ----------------------------------------------------------------------------------------------------------------------------------  [id number] HAYDEN HALIMA, [facility] 50; [address], [place] [address]  [id number] HAYDEN [facility] 50; [address] [place] [address]  [id number] [facility]STEVE [facility] 50; [address], [place] [address]  [id number] HAYDEN [facility] 50; [address] A [address], [place] [address]  [id number] HALLIESTEVE [facility] 50; [address] [place] [address]  MED Summary  This section displays cumulative MED values by unique recipient. The MED Max value is the maximum occurrence of cumulative MED  sustained for any 3 consecutive days. This value is calculated based on prescriptions dispensed during the date range requested.  -----------------------------------------------------------------------------------------------------------------------------------  0 HALIMA BLAKE; 1950; [address] [place] [address]

## 2021-06-22 NOTE — TELEPHONE ENCOUNTER
Date of Last Office Visit: 11/15/18  Date of Next Office Visit: 1/10/19  No shows since last visit: no  Cancellations since last visit: no  ED visits since last visit: no    Medication Trazodone 100 mg date last ordered: 12/4/18  Qty: 30  Refills: 0    traZODone (DESYREL) 100 MG tablet 30 tablet 0 12/4/2018     Sig: TAKE 1 TO 1 AND 1/2 TABLETS BY MOUTH 1 HOUR BEFORE SLEEP      Lapse in therapy greater than 7 days: 0  Medication refill request verified as identical to current order: yes  Result of Last DAM, VPA, Li+ Level, CBC, or Carbamazepine Level (at or since last visit): Negative UTOX and positive BUP on 11/15/18        []Eligibility - not seen in last year    []Supervision - no future appointment    []Compliance     []Verification - order discrepancy    []Controlled Medication    []90 - day supply request    [x]Other LPN pending medications    Current Medication list:      aspirin 81 MG EC tablet Take 81 mg by mouth daily.   buprenorphine-naloxone (SUBOXONE) 8-2 mg Film per sublingual film TAKE 1 DOSE SUBLINGUAL TWICE DAILY AS DIRECTED.   cholecalciferol, vitamin D3, (VITAMIN D3) 2,000 unit Tab Take 1 tablet by mouth every evening.   CYANOCOBALAMIN, VITAMIN B-12, (VITAMIN B12 ORAL) Take 1 tablet by mouth 2 (two) times a day.   FERROUS SULFATE, DRIED (IRON, DRIED, ORAL) Take 1 tablet by mouth. About every 3 days.   folic acid (FOLVITE) 1 MG tablet TK 1 T PO QD   furosemide (LASIX) 20 MG tablet Take 20 mg by mouth daily as needed.   gabapentin (NEURONTIN) 100 MG capsule TAKE 3-4 CAPSULES BY MOUTH AT BEDTIME.   multivitamin therapeutic (THERAGRAN) tablet Take 1 tablet by mouth daily.   OMEGA-3/DHA/EPA/FISH OIL (FISH OIL-OMEGA-3 FATTY ACIDS) 300-1,000 mg capsule Take 2 g by mouth daily.   RED YEAST RICE ORAL Take by mouth.   traZODone (DESYREL) 100 MG tablet TAKE 1 TO 1 AND 1/2 TABLETS BY MOUTH 1 HOUR BEFORE SLEEP   UBIDECARENONE (COENZYME Q10) 100 mg Tab tablet Take 100 mg by mouth daily.   venlafaxine (EFFEXOR  XR) 75 MG 24 hr capsule Take 1 capsule (75 mg total) by mouth daily.   vitamin E 200 UNIT capsule Take 200 Units by mouth daily.         Medication Plan of Care at last office visit with MD/CNP:    PLAN:    1. Patient given a refill of suboxone at current dose of 8/2 mg two times a day x 30 days then refill x 1.  2. Patient encouraged to stay on effexor, especially as his wife has noticed he is more pleasant to be around while on this medication. Patient plans to do so at this time. Continue to monitor mood. Continue on gabapentin to use for insomnia at night prn.  3. Patient again encouraged to consider individual psychotherapy and attending a weekly recovery meeting for increased support.  4. Patient urine toxicology is as expected.  5. Patient to follow-up in 2 motns or sooner if needed    [place] : NA

## 2021-06-23 NOTE — TELEPHONE ENCOUNTER
Per Dr. Brunner's directive:    Brunner, Emily A, MD 1 hour ago (1:08 PM)        Please contact yudith and make sure that he has an appointment to see his primary care physician regarding his elevated blood pressure. Would also like an open release to his pcp if that is possible over the phone. Thanks.          Call placed to Yudith, and message left for him to call on the above message.

## 2021-06-23 NOTE — TELEPHONE ENCOUNTER
Rishi just started a new job, and cannot come in this week.  He will come in on 01/28/2019 to see Dr. Brunner.  He admits to periodic shots of alcohol to deal with the pain in his knees.

## 2021-06-23 NOTE — PROGRESS NOTES
Correct pharmacy verified with patient and confirmed in snapshot? [x] yes []no    Charge captured ? [x] yes  [] no    Medications Phoned  to Pharmacy [] yes [x]no  Name of Pharmacist:  List Medications, including dose, quantity and instructions      Medication Prescriptions given to patient   [] yes  [x] no   List the name of the drug the prescription was written for.       Medications ordered this visit were e-scribed.  Verified by order class [x] yes  [] no    Medication changes or discontinuations were communicated to patient's pharmacy: [x] yes  [] no    UA collected [x] yes  [] no    [place] Prescription Monitoring Program Reviewed? [x] yes  [] no    Referrals were made to:  none  Future appointment was made: [x] yes  [] no    Dictation completed at time of chart check: [] yes  [x] no    I have checked the documentation for today s encounters and the above information has been reviewed and completed.

## 2021-06-23 NOTE — TELEPHONE ENCOUNTER
Patient called back regarding his blood pressure.  He has not seen his primary recently, but he states when he first gets his BP taken in clinic, it is somewhat high, so during the visit, they retake it and it's lower.  He thinks it's from his smoking.  He has an appointment with you next week Tuesday, and he will discuss it with you then.  He will also sign an MINA for his primary at this visit.    He also wants you to know that he is doing very well with his not drinking.

## 2021-06-23 NOTE — TELEPHONE ENCOUNTER
Attempted to reach pt again, but phone goes straight to voicemail and mailbox is full. Unable to leave a message.

## 2021-06-23 NOTE — TELEPHONE ENCOUNTER
Please contact yudith and make sure that he has an appointment to see his primary care physician regarding his elevated blood pressure. Would also like an open release to his pcp if that is possible over the phone. Thanks.

## 2021-06-23 NOTE — TELEPHONE ENCOUNTER
Please let patient know his ETG was >10,000 and I would like to see him for an urgent appointment to discuss drinking.

## 2021-06-23 NOTE — TELEPHONE ENCOUNTER
Refill request received for Venlafaxine  mg.  Spoke to Rishi, and he had just used up the 150 mg capsules using one very other day.  He had forgotten that Dr. Brunner had sent a script to the pharmacy for the 75 mg capsules, so he will go and pick that up instead.  [facility] confirmed that they still had the script there, and would set it up for him, and they were informed that the 150 mg dose was discontinued on 10/18/2018.

## 2021-06-24 NOTE — PROGRESS NOTES
Correct pharmacy verified with patient and confirmed in snapshot? [x] yes []no    Charge captured ? [x] yes  [] no    Medications Phoned  to Pharmacy [] yes [x]no  Name of Pharmacist:  List Medications, including dose, quantity and instructions      Medication Prescriptions given to patient   [] yes  [x] no   List the name of the drug the prescription was written for.       Medications ordered this visit were e-scribed.  Verified by order class [x] yes  [] no  Suboxone 8-2 mg  Gabapentin 300 mg  Effexor 150 mg    Medication changes or discontinuations were communicated to patient's pharmacy: [x] yes  [] no    UA collected [x] yes  [] no    [place] Prescription Monitoring Program Reviewed? [x] yes  [] no    Referrals were made to:none      Future appointment was made: [x] yes  [] no    Dictation completed at time of chart check: [] yes  [x] no    I have checked the documentation for today s encounters and the above information has been reviewed and completed.

## 2021-06-24 NOTE — TELEPHONE ENCOUNTER
Patient has appointment today with Dr. Brunner.  Suboxone refill will be addressed at the appointment.

## 2021-06-24 NOTE — TELEPHONE ENCOUNTER
Rishi states that he called yesterday and talked to the [facility].  States he would like to discuss this with Dr. Brunner when he sees her on 02/19/2019.  He said he believes there are several factors causing the BP to be elevated:    1.  States he is doing well at the stopping of the drinking.  2.  He has dropped some weight.  3.  The BP is taken right as he gets here, and is drinking coffee and smoking cigarettes during the drive down, and feels if the [facility] took his BP at the end of meeting with him versus at the beginning, that the BP would be much better.      He will discuss all with Dr. Brunner at the appointment, but at this point does not want to see him primary MD.

## 2021-06-28 NOTE — PROGRESS NOTES
Progress Notes by Naty Galeano CMA at 3/27/2020  9:45 AM     Author: Naty Galeano CMA Service: Addiction Care Author Type: Certified Medical Assistant    Filed: 4/3/2020  8:36 PM Encounter Date: 3/27/2020 Status: Signed    : Naty Galeano CMA (Certified Medical Assistant)           Medications Phoned  to Pharmacy [] yes [x]no  Name of Pharmacist:  List Medications, including dose, quantity and instructions    Medications ordered this visit were e-scribed.  Verified by order class [x] yes  [] no    Medication changes or discontinuations were communicated to patient's pharmacy: [] yes  [x] no    Future appointment was made: [] yes  [x] No-left message for patient to follow up on one month    Dictation completed at time of chart check: [] yes  [x] no    I have checked the documentation for todays encounters and the above information has been reviewed and completed.
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
## 2021-06-29 NOTE — PROGRESS NOTES
Progress Notes by Kelly Peralta RN at 7/21/2020 11:45 AM     Author: Kelly Peralta RN Service: Behavioral Author Type: Registered Nurse    Filed: 7/21/2020  6:58 PM Encounter Date: 7/21/2020 Status: Signed    : Kelly Peralta RN (Registered Nurse)       This video/telephone visit will be conducted via a call between you and your physician/provider. We have found that certain health care needs can be provided without the need for an in-person physical exam. This service lets us provide the care you need with a video /telephone conversation. If a prescription is necessary we can send it directly to your pharmacy. If lab work is needed we can place an order for that and you can then stop by our lab to have the test done at a later time.   Just as we bill insurance for in-person visits, we also bill insurance for video/telephone visits. If you have questions about your insurance coverage, we recommend that you speak with your insurance company.   Patient has given verbal consent for video/Telephone visit? yes  Patient would like the video visit invitation sent by: Text to cell phone: [phone]   ANURAG Peralta    Patient verified allergies, medications and pharmacy via phone. PHQ :9 and JERZY:12  done verbally with writer. Patient states   is ready for visit.    Rishi would like to have Gabapentin and Trazodone increased, due to stress.  He also would like to have Lasix order.    [place] :
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
## 2021-06-29 NOTE — PROGRESS NOTES
Progress Notes by Naty Galeano CMA at 6/16/2020 11:15 AM     Author: Naty Galeano CMA Service: Addiction Care Author Type: Certified Medical Assistant    Filed: 6/16/2020  2:15 PM Encounter Date: 6/16/2020 Status: Addendum    : Naty Galeano CMA (Certified Medical Assistant)    Related Notes: Original Note by Naty Galeano CMA (Certified Medical Assistant) filed at 6/16/2020  2:03 PM       This video/telephone visit will be conducted via a call between you and your physician/provider. We have found that certain health care needs can be provided without the need for an in-person physical exam. This service lets us provide the care you need with a video /telephone conversation. If a prescription is necessary we can send it directly to your pharmacy. If lab work is needed we can place an order for that and you can then stop by our lab to have the test done at a later time.   Just as we bill insurance for in-person visits, we also bill insurance for video/telephone visits. If you have questions about your insurance coverage, we recommend that you speak with your insurance company.   Patient has given verbal consent for video/Telephone visit? yes  FALLON/ALICE GARAY    Patient verified allergies, medications and pharmacy via phone. PHQ : and JERZY:  done verbally with writer. Patient states he is ready for visit.  PHQ-9  JERZY-11        ________________________________________  Medications Phoned  to Pharmacy [] yes [x]no  Name of Pharmacist:  List Medications, including dose, quantity and instructions    Medications ordered this visit were e-scribed.  Verified by order class [x] yes  [] no  Suboxone 8-2 mg  Gabapentin 600 mg  Medication changes or discontinuations were communicated to patient's pharmacy: [] yes  [x] no    Dictation completed at time of chart check: [x] yes  [] no    I have checked the documentation for todays encounters and the above information has been reviewed  and completed.  \
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
## 2021-07-03 NOTE — ADDENDUM NOTE
Addendum Note by Halima Guevara MD at 1/11/2017  3:12 PM     Author: Halima Guevara MD Service: -- Author Type: Physician    Filed: 1/11/2017  3:12 PM Encounter Date: 1/11/2017 Status: Signed    : Halima Guevara MD (Physician)    Addended by: HALIMA GUEVARA on: 1/11/2017 03:12 PM        Modules accepted: Orders

## 2021-07-03 NOTE — ADDENDUM NOTE
Addendum Note by Tammy Greene RN at 1/24/2018 12:02 PM     Author: Tammy Greene RN Service: -- Author Type: Registered Nurse    Filed: 1/24/2018 12:02 PM Encounter Date: 1/24/2018 Status: Signed    : Tammy Greene RN (Registered Nurse)    Addended by: TAMMY GREENE on: 1/24/2018 12:02 PM        Modules accepted: Orders

## 2021-07-03 NOTE — ADDENDUM NOTE
Addendum Note by Halima Guevara MD at 1/24/2018  1:12 PM     Author: Halima Guevara MD Service: -- Author Type: Physician    Filed: 1/24/2018  1:12 PM Encounter Date: 1/24/2018 Status: Signed    : Halima Guevara MD (Physician)    Addended by: HALIMA GUEVARA on: 1/24/2018 01:12 PM        Modules accepted: Orders

## 2021-07-03 NOTE — ADDENDUM NOTE
Addendum Note by Brunner, Emily A, MD at 7/19/2019  3:30 PM     Author: Brunner, Emily A, MD Service: Addiction Care Author Type: Physician    Filed: 7/19/2019  3:30 PM Encounter Date: 7/19/2019 Status: Signed    : Brunner, Emily A, MD (Physician)    Addended by: BRUNNER, EMILY A on: 7/19/2019 03:30 PM        Modules accepted: Orders

## 2021-09-08 NOTE — PROGRESS NOTES
Telemedicine Visit: The patient's condition can be safely assessed and treated via synchronous audio and visual telemedicine encounter.      Reason for Telemedicine Visit: Services only offered telehealth    Originating Site (Patient Location): Patient's home    Distant Site (Provider Location): Provider Remote Setting- Home Office    Consent:  The patient/guardian has verbally consented to: the potential risks and benefits of telemedicine (video visit) versus in person care; bill my insurance or make self-payment for services provided; and responsibility for payment of non-covered services.     Mode of Communication:  Video Conference via Zoom    Call Started at: 9:30 AM  Call Ended at: 12:10 PM    As the provider I attest to compliance with applicable laws and regulations related to telemedicine.

## 2023-06-08 NOTE — TELEPHONE ENCOUNTER
Call received from pharmacist Marlene regarding this refill.Marlene was told that this patient needs to have a follow up visit scheduled in order to receive a refill. Marlene said that she would call and inform the patient.    None